# Patient Record
Sex: FEMALE | Race: WHITE | Employment: OTHER | URBAN - NONMETROPOLITAN AREA
[De-identification: names, ages, dates, MRNs, and addresses within clinical notes are randomized per-mention and may not be internally consistent; named-entity substitution may affect disease eponyms.]

---

## 2017-07-18 ENCOUNTER — APPOINTMENT (OUTPATIENT)
Dept: OCCUPATIONAL THERAPY | Age: 67
End: 2017-07-18
Payer: MEDICARE

## 2017-07-18 ENCOUNTER — HOSPITAL ENCOUNTER (OUTPATIENT)
Dept: OCCUPATIONAL THERAPY | Age: 67
Setting detail: THERAPIES SERIES
Discharge: HOME | End: 2017-07-18
Payer: MEDICARE

## 2017-07-18 PROCEDURE — 97165 OT EVAL LOW COMPLEX 30 MIN: CPT

## 2017-07-18 PROCEDURE — G8987 SELF CARE CURRENT STATUS: HCPCS

## 2017-07-18 PROCEDURE — 97110 THERAPEUTIC EXERCISES: CPT

## 2017-07-18 PROCEDURE — G8988 SELF CARE GOAL STATUS: HCPCS

## 2017-07-18 ASSESSMENT — PAIN SCALES - GENERAL: PAINLEVEL_OUTOF10: 3

## 2017-07-18 ASSESSMENT — PAIN DESCRIPTION - LOCATION: LOCATION: SHOULDER

## 2017-07-18 ASSESSMENT — PAIN DESCRIPTION - ORIENTATION: ORIENTATION: LEFT

## 2017-07-20 ENCOUNTER — HOSPITAL ENCOUNTER (OUTPATIENT)
Dept: OCCUPATIONAL THERAPY | Age: 67
Setting detail: THERAPIES SERIES
Discharge: HOME | End: 2017-07-20
Payer: MEDICARE

## 2017-07-20 PROCEDURE — 97110 THERAPEUTIC EXERCISES: CPT

## 2017-07-20 ASSESSMENT — PAIN DESCRIPTION - ORIENTATION: ORIENTATION: LEFT

## 2017-07-20 ASSESSMENT — PAIN SCALES - GENERAL: PAINLEVEL_OUTOF10: 3

## 2017-07-20 ASSESSMENT — PAIN DESCRIPTION - LOCATION: LOCATION: SHOULDER

## 2017-07-24 ENCOUNTER — HOSPITAL ENCOUNTER (OUTPATIENT)
Dept: OCCUPATIONAL THERAPY | Age: 67
Setting detail: THERAPIES SERIES
Discharge: HOME | End: 2017-07-24
Payer: MEDICARE

## 2017-07-24 PROCEDURE — 97110 THERAPEUTIC EXERCISES: CPT

## 2017-07-24 ASSESSMENT — PAIN SCALES - GENERAL: PAINLEVEL_OUTOF10: 3

## 2017-07-24 ASSESSMENT — PAIN DESCRIPTION - LOCATION: LOCATION: SHOULDER

## 2017-07-24 ASSESSMENT — PAIN DESCRIPTION - ORIENTATION: ORIENTATION: LEFT

## 2017-07-31 ENCOUNTER — HOSPITAL ENCOUNTER (OUTPATIENT)
Dept: OCCUPATIONAL THERAPY | Age: 67
Setting detail: THERAPIES SERIES
Discharge: HOME | End: 2017-07-31
Payer: MEDICARE

## 2017-07-31 PROCEDURE — 97110 THERAPEUTIC EXERCISES: CPT

## 2017-07-31 ASSESSMENT — PAIN DESCRIPTION - ORIENTATION: ORIENTATION: LEFT

## 2017-07-31 ASSESSMENT — PAIN SCALES - GENERAL: PAINLEVEL_OUTOF10: 3

## 2017-07-31 ASSESSMENT — PAIN DESCRIPTION - LOCATION: LOCATION: SHOULDER

## 2017-08-01 ENCOUNTER — HOSPITAL ENCOUNTER (OUTPATIENT)
Dept: OCCUPATIONAL THERAPY | Age: 67
Setting detail: THERAPIES SERIES
Discharge: HOME | End: 2017-08-01
Payer: MEDICARE

## 2017-08-01 PROCEDURE — 97110 THERAPEUTIC EXERCISES: CPT

## 2017-08-01 ASSESSMENT — PAIN DESCRIPTION - LOCATION: LOCATION: SHOULDER;ARM

## 2017-08-01 ASSESSMENT — PAIN DESCRIPTION - ORIENTATION: ORIENTATION: LEFT

## 2017-08-01 ASSESSMENT — PAIN SCALES - GENERAL: PAINLEVEL_OUTOF10: 3

## 2017-08-03 ENCOUNTER — HOSPITAL ENCOUNTER (OUTPATIENT)
Dept: OCCUPATIONAL THERAPY | Age: 67
Setting detail: THERAPIES SERIES
Discharge: HOME | End: 2017-08-03
Payer: MEDICARE

## 2017-08-03 PROCEDURE — 97110 THERAPEUTIC EXERCISES: CPT

## 2017-08-03 ASSESSMENT — PAIN SCALES - GENERAL: PAINLEVEL_OUTOF10: 3

## 2017-08-03 ASSESSMENT — PAIN DESCRIPTION - LOCATION: LOCATION: SHOULDER

## 2017-08-03 ASSESSMENT — PAIN DESCRIPTION - ORIENTATION: ORIENTATION: LEFT

## 2017-08-07 ENCOUNTER — HOSPITAL ENCOUNTER (OUTPATIENT)
Dept: OCCUPATIONAL THERAPY | Age: 67
Setting detail: THERAPIES SERIES
Discharge: HOME | End: 2017-08-07
Payer: MEDICARE

## 2017-08-07 PROCEDURE — 97110 THERAPEUTIC EXERCISES: CPT

## 2017-08-07 ASSESSMENT — PAIN SCALES - GENERAL: PAINLEVEL_OUTOF10: 3

## 2017-08-07 ASSESSMENT — PAIN DESCRIPTION - ORIENTATION: ORIENTATION: LEFT

## 2017-08-07 ASSESSMENT — PAIN DESCRIPTION - LOCATION: LOCATION: SHOULDER

## 2017-08-08 ENCOUNTER — HOSPITAL ENCOUNTER (OUTPATIENT)
Dept: OCCUPATIONAL THERAPY | Age: 67
Setting detail: THERAPIES SERIES
Discharge: HOME | End: 2017-08-08
Payer: MEDICARE

## 2017-08-08 PROCEDURE — G8987 SELF CARE CURRENT STATUS: HCPCS

## 2017-08-08 PROCEDURE — G8988 SELF CARE GOAL STATUS: HCPCS

## 2017-08-08 PROCEDURE — 97110 THERAPEUTIC EXERCISES: CPT

## 2017-08-08 ASSESSMENT — PAIN DESCRIPTION - ORIENTATION: ORIENTATION: LEFT

## 2017-08-08 ASSESSMENT — PAIN SCALES - GENERAL: PAINLEVEL_OUTOF10: 3

## 2017-08-08 ASSESSMENT — PAIN DESCRIPTION - LOCATION: LOCATION: SHOULDER

## 2017-08-10 ENCOUNTER — HOSPITAL ENCOUNTER (OUTPATIENT)
Age: 67
Discharge: HOME | End: 2017-08-10
Payer: MEDICARE

## 2017-08-10 ENCOUNTER — HOSPITAL ENCOUNTER (OUTPATIENT)
Dept: OCCUPATIONAL THERAPY | Age: 67
Setting detail: THERAPIES SERIES
Discharge: HOME | End: 2017-08-10
Payer: MEDICARE

## 2017-08-10 LAB
ANION GAP SERPL CALCULATED.3IONS-SCNC: 13 MEQ/L (ref 8–16)
BASOPHILS # BLD: 0.9 %
BASOPHILS ABSOLUTE: 0 THOU/MM3 (ref 0–0.1)
BUN BLDV-MCNC: 9 MG/DL (ref 7–22)
CALCIUM SERPL-MCNC: 9.5 MG/DL (ref 8.5–10.5)
CHLORIDE BLD-SCNC: 101 MEQ/L (ref 98–111)
CHOLESTEROL, TOTAL: 187 MG/DL (ref 100–199)
CO2: 26 MEQ/L (ref 23–33)
CREAT SERPL-MCNC: 0.6 MG/DL (ref 0.4–1.2)
EOSINOPHIL # BLD: 4.4 %
EOSINOPHILS ABSOLUTE: 0.2 THOU/MM3 (ref 0–0.4)
GFR SERPL CREATININE-BSD FRML MDRD: > 90 ML/MIN/1.73M2
GLUCOSE BLD-MCNC: 99 MG/DL (ref 70–108)
HCT VFR BLD CALC: 46.2 % (ref 37–47)
HDLC SERPL-MCNC: 69 MG/DL
HEMOGLOBIN: 15.5 GM/DL (ref 12–16)
HEPATITIS C ANTIBODY: NEGATIVE
LDL CHOLESTEROL CALCULATED: 105 MG/DL
LYMPHOCYTES # BLD: 27.8 %
LYMPHOCYTES ABSOLUTE: 1.5 THOU/MM3 (ref 1–4.8)
MCH RBC QN AUTO: 30.5 PG (ref 27–31)
MCHC RBC AUTO-ENTMCNC: 33.5 GM/DL (ref 33–37)
MCV RBC AUTO: 91 FL (ref 81–99)
MONOCYTES # BLD: 9.4 %
MONOCYTES ABSOLUTE: 0.5 THOU/MM3 (ref 0.4–1.3)
NUCLEATED RED BLOOD CELLS: 0 /100 WBC
PDW BLD-RTO: 14.3 % (ref 11.5–14.5)
PLATELET # BLD: 353 THOU/MM3 (ref 130–400)
PMV BLD AUTO: 8.2 MCM (ref 7.4–10.4)
POTASSIUM SERPL-SCNC: 4.5 MEQ/L (ref 3.5–5.2)
RBC # BLD: 5.07 MILL/MM3 (ref 4.2–5.4)
RBC # BLD: NORMAL 10*6/UL
SEG NEUTROPHILS: 57.5 %
SEGMENTED NEUTROPHILS ABSOLUTE COUNT: 3 THOU/MM3 (ref 1.8–7.7)
SODIUM BLD-SCNC: 140 MEQ/L (ref 135–145)
TRIGL SERPL-MCNC: 63 MG/DL (ref 0–199)
WBC # BLD: 5.3 THOU/MM3 (ref 4.8–10.8)

## 2017-08-10 PROCEDURE — 97110 THERAPEUTIC EXERCISES: CPT

## 2017-08-10 PROCEDURE — 80061 LIPID PANEL: CPT

## 2017-08-10 PROCEDURE — 36415 COLL VENOUS BLD VENIPUNCTURE: CPT

## 2017-08-10 PROCEDURE — 86803 HEPATITIS C AB TEST: CPT

## 2017-08-10 PROCEDURE — 80048 BASIC METABOLIC PNL TOTAL CA: CPT

## 2017-08-10 PROCEDURE — 85025 COMPLETE CBC W/AUTO DIFF WBC: CPT

## 2017-08-10 ASSESSMENT — PAIN DESCRIPTION - ORIENTATION: ORIENTATION: LEFT

## 2017-08-10 ASSESSMENT — PAIN DESCRIPTION - LOCATION: LOCATION: SHOULDER

## 2017-08-10 ASSESSMENT — PAIN SCALES - GENERAL: PAINLEVEL_OUTOF10: 3

## 2017-08-14 ENCOUNTER — HOSPITAL ENCOUNTER (OUTPATIENT)
Dept: OCCUPATIONAL THERAPY | Age: 67
Setting detail: THERAPIES SERIES
Discharge: HOME | End: 2017-08-14
Payer: MEDICARE

## 2017-08-14 PROCEDURE — 97110 THERAPEUTIC EXERCISES: CPT

## 2017-08-14 ASSESSMENT — PAIN SCALES - GENERAL: PAINLEVEL_OUTOF10: 3

## 2017-08-14 ASSESSMENT — PAIN DESCRIPTION - LOCATION: LOCATION: SHOULDER;ARM

## 2017-08-14 ASSESSMENT — PAIN DESCRIPTION - ORIENTATION: ORIENTATION: LEFT

## 2017-08-17 ENCOUNTER — HOSPITAL ENCOUNTER (OUTPATIENT)
Dept: OCCUPATIONAL THERAPY | Age: 67
Setting detail: THERAPIES SERIES
Discharge: HOME | End: 2017-08-17
Payer: MEDICARE

## 2017-08-17 PROCEDURE — 97110 THERAPEUTIC EXERCISES: CPT

## 2017-08-17 ASSESSMENT — PAIN SCALES - GENERAL: PAINLEVEL_OUTOF10: 3

## 2017-08-17 ASSESSMENT — PAIN DESCRIPTION - ORIENTATION: ORIENTATION: LEFT

## 2017-08-17 ASSESSMENT — PAIN DESCRIPTION - LOCATION: LOCATION: SHOULDER;ARM

## 2017-08-21 ENCOUNTER — HOSPITAL ENCOUNTER (OUTPATIENT)
Dept: OCCUPATIONAL THERAPY | Age: 67
Setting detail: THERAPIES SERIES
Discharge: HOME | End: 2017-08-21
Payer: MEDICARE

## 2017-08-21 PROCEDURE — 97110 THERAPEUTIC EXERCISES: CPT

## 2017-08-21 ASSESSMENT — PAIN DESCRIPTION - LOCATION: LOCATION: SHOULDER

## 2017-08-21 ASSESSMENT — PAIN DESCRIPTION - ORIENTATION: ORIENTATION: LEFT

## 2017-08-21 ASSESSMENT — PAIN SCALES - GENERAL: PAINLEVEL_OUTOF10: 2

## 2017-08-22 ENCOUNTER — HOSPITAL ENCOUNTER (OUTPATIENT)
Dept: OCCUPATIONAL THERAPY | Age: 67
Setting detail: THERAPIES SERIES
Discharge: HOME | End: 2017-08-22
Payer: MEDICARE

## 2017-08-22 PROCEDURE — 97110 THERAPEUTIC EXERCISES: CPT

## 2017-08-22 ASSESSMENT — PAIN DESCRIPTION - ORIENTATION: ORIENTATION: LEFT

## 2017-08-22 ASSESSMENT — PAIN DESCRIPTION - LOCATION: LOCATION: SHOULDER

## 2017-08-22 ASSESSMENT — PAIN SCALES - GENERAL: PAINLEVEL_OUTOF10: 2

## 2017-08-24 ENCOUNTER — HOSPITAL ENCOUNTER (OUTPATIENT)
Dept: OCCUPATIONAL THERAPY | Age: 67
Setting detail: THERAPIES SERIES
Discharge: HOME | End: 2017-08-24
Payer: MEDICARE

## 2017-08-24 PROCEDURE — 97110 THERAPEUTIC EXERCISES: CPT

## 2017-08-24 ASSESSMENT — PAIN DESCRIPTION - LOCATION: LOCATION: SHOULDER

## 2017-08-24 ASSESSMENT — PAIN SCALES - GENERAL: PAINLEVEL_OUTOF10: 2

## 2017-08-24 ASSESSMENT — PAIN DESCRIPTION - ORIENTATION: ORIENTATION: LEFT

## 2017-08-28 ENCOUNTER — HOSPITAL ENCOUNTER (OUTPATIENT)
Dept: OCCUPATIONAL THERAPY | Age: 67
Setting detail: THERAPIES SERIES
Discharge: HOME | End: 2017-08-28
Payer: MEDICARE

## 2017-08-28 PROCEDURE — 97110 THERAPEUTIC EXERCISES: CPT

## 2017-08-28 ASSESSMENT — PAIN DESCRIPTION - ORIENTATION: ORIENTATION: LEFT

## 2017-08-28 ASSESSMENT — PAIN DESCRIPTION - LOCATION: LOCATION: SHOULDER

## 2017-08-28 ASSESSMENT — PAIN SCALES - GENERAL: PAINLEVEL_OUTOF10: 2

## 2017-08-29 ENCOUNTER — HOSPITAL ENCOUNTER (OUTPATIENT)
Dept: OCCUPATIONAL THERAPY | Age: 67
Setting detail: THERAPIES SERIES
Discharge: HOME | End: 2017-08-29
Payer: MEDICARE

## 2017-08-29 PROCEDURE — 97110 THERAPEUTIC EXERCISES: CPT

## 2017-08-29 ASSESSMENT — PAIN DESCRIPTION - ORIENTATION: ORIENTATION: LEFT

## 2017-08-29 ASSESSMENT — PAIN DESCRIPTION - LOCATION: LOCATION: SHOULDER

## 2017-08-29 ASSESSMENT — PAIN SCALES - GENERAL: PAINLEVEL_OUTOF10: 2

## 2017-08-31 ENCOUNTER — HOSPITAL ENCOUNTER (OUTPATIENT)
Dept: OCCUPATIONAL THERAPY | Age: 67
Setting detail: THERAPIES SERIES
Discharge: HOME | End: 2017-08-31
Payer: MEDICARE

## 2017-08-31 PROCEDURE — 97110 THERAPEUTIC EXERCISES: CPT

## 2017-08-31 ASSESSMENT — PAIN DESCRIPTION - ORIENTATION: ORIENTATION: LEFT

## 2017-08-31 ASSESSMENT — PAIN SCALES - GENERAL: PAINLEVEL_OUTOF10: 3

## 2017-08-31 ASSESSMENT — PAIN DESCRIPTION - LOCATION: LOCATION: SHOULDER

## 2017-09-05 ENCOUNTER — HOSPITAL ENCOUNTER (OUTPATIENT)
Dept: OCCUPATIONAL THERAPY | Age: 67
Setting detail: THERAPIES SERIES
Discharge: HOME | End: 2017-09-05
Payer: MEDICARE

## 2017-09-05 PROCEDURE — 97110 THERAPEUTIC EXERCISES: CPT

## 2017-09-05 PROCEDURE — G8987 SELF CARE CURRENT STATUS: HCPCS

## 2017-09-05 PROCEDURE — G8988 SELF CARE GOAL STATUS: HCPCS

## 2017-09-05 ASSESSMENT — PAIN SCALES - GENERAL: PAINLEVEL_OUTOF10: 2

## 2017-09-05 ASSESSMENT — PAIN DESCRIPTION - ORIENTATION: ORIENTATION: LEFT

## 2017-09-05 ASSESSMENT — PAIN DESCRIPTION - LOCATION: LOCATION: SHOULDER

## 2017-09-07 ENCOUNTER — HOSPITAL ENCOUNTER (OUTPATIENT)
Dept: OCCUPATIONAL THERAPY | Age: 67
Setting detail: THERAPIES SERIES
Discharge: HOME | End: 2017-09-07
Payer: MEDICARE

## 2017-09-07 PROCEDURE — 97110 THERAPEUTIC EXERCISES: CPT

## 2017-09-07 ASSESSMENT — PAIN DESCRIPTION - ORIENTATION: ORIENTATION: LEFT

## 2017-09-07 ASSESSMENT — PAIN SCALES - GENERAL: PAINLEVEL_OUTOF10: 1

## 2017-09-07 ASSESSMENT — PAIN DESCRIPTION - LOCATION: LOCATION: SHOULDER

## 2017-09-11 ENCOUNTER — HOSPITAL ENCOUNTER (OUTPATIENT)
Dept: OCCUPATIONAL THERAPY | Age: 67
Setting detail: THERAPIES SERIES
Discharge: HOME | End: 2017-09-11
Payer: MEDICARE

## 2017-09-11 PROCEDURE — 97110 THERAPEUTIC EXERCISES: CPT

## 2017-09-11 ASSESSMENT — PAIN SCALES - GENERAL: PAINLEVEL_OUTOF10: 1

## 2017-09-11 ASSESSMENT — PAIN DESCRIPTION - ORIENTATION: ORIENTATION: LEFT

## 2017-09-11 ASSESSMENT — PAIN DESCRIPTION - LOCATION: LOCATION: SHOULDER

## 2017-09-14 ENCOUNTER — HOSPITAL ENCOUNTER (OUTPATIENT)
Dept: OCCUPATIONAL THERAPY | Age: 67
Setting detail: THERAPIES SERIES
Discharge: HOME | End: 2017-09-14
Payer: MEDICARE

## 2017-09-14 PROCEDURE — 97110 THERAPEUTIC EXERCISES: CPT

## 2017-09-14 ASSESSMENT — PAIN SCALES - GENERAL: PAINLEVEL_OUTOF10: 2

## 2017-09-14 ASSESSMENT — PAIN DESCRIPTION - ORIENTATION: ORIENTATION: LEFT

## 2017-09-14 ASSESSMENT — PAIN DESCRIPTION - LOCATION: LOCATION: SHOULDER

## 2017-09-18 ENCOUNTER — HOSPITAL ENCOUNTER (OUTPATIENT)
Dept: OCCUPATIONAL THERAPY | Age: 67
Setting detail: THERAPIES SERIES
Discharge: HOME | End: 2017-09-18
Payer: MEDICARE

## 2017-09-18 PROCEDURE — 97110 THERAPEUTIC EXERCISES: CPT

## 2017-09-18 ASSESSMENT — PAIN DESCRIPTION - LOCATION: LOCATION: SHOULDER

## 2017-09-18 ASSESSMENT — PAIN SCALES - GENERAL: PAINLEVEL_OUTOF10: 4

## 2017-09-18 ASSESSMENT — PAIN DESCRIPTION - ORIENTATION: ORIENTATION: LEFT

## 2017-09-19 ENCOUNTER — HOSPITAL ENCOUNTER (OUTPATIENT)
Dept: OCCUPATIONAL THERAPY | Age: 67
Setting detail: THERAPIES SERIES
Discharge: HOME | End: 2017-09-19
Payer: MEDICARE

## 2017-09-19 PROCEDURE — 97110 THERAPEUTIC EXERCISES: CPT

## 2017-09-19 ASSESSMENT — PAIN DESCRIPTION - LOCATION: LOCATION: SHOULDER

## 2017-09-19 ASSESSMENT — PAIN DESCRIPTION - ORIENTATION: ORIENTATION: LEFT

## 2017-09-19 ASSESSMENT — PAIN SCALES - GENERAL: PAINLEVEL_OUTOF10: 2

## 2017-09-21 ENCOUNTER — HOSPITAL ENCOUNTER (OUTPATIENT)
Dept: OCCUPATIONAL THERAPY | Age: 67
Setting detail: THERAPIES SERIES
Discharge: HOME | End: 2017-09-21
Payer: MEDICARE

## 2017-09-21 PROCEDURE — 97110 THERAPEUTIC EXERCISES: CPT

## 2017-09-21 ASSESSMENT — PAIN DESCRIPTION - LOCATION: LOCATION: SHOULDER

## 2017-09-21 ASSESSMENT — PAIN DESCRIPTION - ORIENTATION: ORIENTATION: LEFT

## 2017-09-21 ASSESSMENT — PAIN SCALES - GENERAL: PAINLEVEL_OUTOF10: 2

## 2017-09-25 ENCOUNTER — HOSPITAL ENCOUNTER (OUTPATIENT)
Dept: OCCUPATIONAL THERAPY | Age: 67
Setting detail: THERAPIES SERIES
Discharge: HOME | End: 2017-09-25
Payer: MEDICARE

## 2017-09-25 PROCEDURE — 97110 THERAPEUTIC EXERCISES: CPT

## 2017-09-25 ASSESSMENT — PAIN SCALES - GENERAL: PAINLEVEL_OUTOF10: 2

## 2017-09-25 ASSESSMENT — PAIN DESCRIPTION - ORIENTATION: ORIENTATION: LEFT

## 2017-09-25 ASSESSMENT — PAIN DESCRIPTION - LOCATION: LOCATION: SHOULDER

## 2017-09-26 ENCOUNTER — HOSPITAL ENCOUNTER (OUTPATIENT)
Dept: OCCUPATIONAL THERAPY | Age: 67
Setting detail: THERAPIES SERIES
Discharge: HOME | End: 2017-09-26
Payer: MEDICARE

## 2017-09-26 PROCEDURE — 97110 THERAPEUTIC EXERCISES: CPT

## 2017-09-26 ASSESSMENT — PAIN DESCRIPTION - LOCATION: LOCATION: SHOULDER

## 2017-09-26 ASSESSMENT — PAIN SCALES - GENERAL: PAINLEVEL_OUTOF10: 2

## 2017-09-26 ASSESSMENT — PAIN DESCRIPTION - ORIENTATION: ORIENTATION: LEFT

## 2017-09-27 ENCOUNTER — HOSPITAL ENCOUNTER (OUTPATIENT)
Dept: MAMMOGRAPHY | Age: 67
Discharge: HOME | End: 2017-09-27
Payer: MEDICARE

## 2017-09-27 DIAGNOSIS — Z12.9 SCREENING FOR CANCER: ICD-10-CM

## 2017-09-27 PROCEDURE — G0202 SCR MAMMO BI INCL CAD: HCPCS

## 2017-09-28 ENCOUNTER — HOSPITAL ENCOUNTER (OUTPATIENT)
Dept: OCCUPATIONAL THERAPY | Age: 67
Setting detail: THERAPIES SERIES
Discharge: HOME | End: 2017-09-28
Payer: MEDICARE

## 2017-09-28 PROCEDURE — 97110 THERAPEUTIC EXERCISES: CPT

## 2017-09-28 ASSESSMENT — PAIN DESCRIPTION - ORIENTATION: ORIENTATION: LEFT

## 2017-09-28 ASSESSMENT — PAIN DESCRIPTION - LOCATION: LOCATION: SHOULDER

## 2017-09-28 ASSESSMENT — PAIN SCALES - GENERAL: PAINLEVEL_OUTOF10: 1

## 2017-10-02 ENCOUNTER — APPOINTMENT (OUTPATIENT)
Dept: OCCUPATIONAL THERAPY | Age: 67
End: 2017-10-02
Payer: MEDICARE

## 2017-10-03 ENCOUNTER — HOSPITAL ENCOUNTER (OUTPATIENT)
Dept: OCCUPATIONAL THERAPY | Age: 67
Setting detail: THERAPIES SERIES
Discharge: HOME | End: 2017-10-03
Payer: MEDICARE

## 2017-10-03 PROCEDURE — G8987 SELF CARE CURRENT STATUS: HCPCS

## 2017-10-03 PROCEDURE — 97110 THERAPEUTIC EXERCISES: CPT

## 2017-10-03 PROCEDURE — G8988 SELF CARE GOAL STATUS: HCPCS

## 2017-10-03 ASSESSMENT — PAIN SCALES - GENERAL: PAINLEVEL_OUTOF10: 2

## 2017-10-03 ASSESSMENT — PAIN DESCRIPTION - LOCATION: LOCATION: SHOULDER

## 2017-10-03 ASSESSMENT — PAIN DESCRIPTION - DESCRIPTORS: DESCRIPTORS: OTHER (COMMENT)

## 2017-10-03 ASSESSMENT — PAIN DESCRIPTION - ORIENTATION: ORIENTATION: LEFT;ANTERIOR

## 2017-10-03 NOTE — PROGRESS NOTES
Jennifer active left shoulder flexion to 120, abduction to 120, get left thumb 2\" above waistband behind back to increase her ability to reach out car window. GOAL NOT MET - SEE OBJECTIVE SECTION OF NOTE FOR DETAILS - REVISED GOAL; Increase active left shoulder flexiont o 120, abduction to 120, get left thumb 2\" above waistband behind back, ER at side to 40, and ER at 90 to 40 to increase her ability to reach into upper cupboard. Short term goal 3: Increase passive left shoulder flexion to 140, abduction to 110, IR to 70, ER at side to 40, and ER at 90 to 10 to increase flexibitliy to allow patient to return to reaching to upper cupboard. GOAL MET FOR ABDUCTION, IR, ER AT SIDE AND ER AT 90, GOAL NOT MET FOR FLEXION = SEE OBJECTIVE SECTION OF NOTE FOR DETAILS - REVISED GOAL; Increase passive elft shoulder flexiont o1 40, abduction to 140, IR to 80, and ER at 90 to 45 to increase flexibility to allow patient to reach behind her head without diffciutly   Short term goal 4: Report being able to turn steering wheel with left arm without pain or difficulty. GOAL MET - REVISED GOAL; Be able to retrieve towels from upper shelf with left UE without difficulty. Long term goals  Time Frame for Long term goals : 4 weeks  Long term goal 1: Report being able to sleep in bed without waking due to left shoulder pain. GOAL MET - REVISED GOAL: Be able to retrieve lightweight item from drive thru using left UE. Long term goal 2: Be able to reach to second shelf of upper cupboard using left arm to retrieve lightweight item. GOAL NOT MET, BUT PATIENT RELATES THAT SHE IS SEEING PROGRESS - CONTINUE GOAL  Long term goal 3: Be able to use left arm to turn steering wheel without difficulty. GOAL MET - REVISED GOAL: Be able to groom back of hair without difficulty using left UE. Long term goal 4: Be able to put letter in mailbox using left arm while in car without difficulty.  GOAL MET - DISCONTINUE GOAL    OT G-codes  Functional

## 2017-10-04 ENCOUNTER — HOSPITAL ENCOUNTER (OUTPATIENT)
Dept: OCCUPATIONAL THERAPY | Age: 67
Setting detail: THERAPIES SERIES
Discharge: HOME | End: 2017-10-04
Payer: MEDICARE

## 2017-10-04 PROCEDURE — 97110 THERAPEUTIC EXERCISES: CPT

## 2017-10-04 ASSESSMENT — PAIN DESCRIPTION - LOCATION: LOCATION: SHOULDER;ARM

## 2017-10-04 ASSESSMENT — PAIN DESCRIPTION - ORIENTATION: ORIENTATION: LEFT

## 2017-10-04 ASSESSMENT — PAIN SCALES - GENERAL: PAINLEVEL_OUTOF10: 2

## 2017-10-04 NOTE — PROGRESS NOTES
Plan:  Plan Comment: Continue per established POC  Specific instructions for Next Treatment: PROM, AROM, AAROM;functional strengthening                 Judd Shane, OTR/L #[id number]

## 2017-10-05 ENCOUNTER — HOSPITAL ENCOUNTER (OUTPATIENT)
Dept: OCCUPATIONAL THERAPY | Age: 67
Setting detail: THERAPIES SERIES
Discharge: HOME | End: 2017-10-05
Payer: MEDICARE

## 2017-10-05 PROCEDURE — 97110 THERAPEUTIC EXERCISES: CPT

## 2017-10-05 ASSESSMENT — PAIN DESCRIPTION - ORIENTATION: ORIENTATION: LEFT

## 2017-10-05 ASSESSMENT — PAIN SCALES - GENERAL: PAINLEVEL_OUTOF10: 2

## 2017-10-05 ASSESSMENT — PAIN DESCRIPTION - LOCATION: LOCATION: SHOULDER;ARM

## 2017-10-05 NOTE — PROGRESS NOTES
[facility]  OUTPATIENT OCCUPATIONAL THERAPY  Daily Note  [address] [facility].    Time In: 930  Time Out: 1000  Minutes: 30  Timed Code Treatment Minutes: 30 Minutes     Date: 10/5/2017  Patient Name: Richie Patel        CSN: [id number]   : 1950  (79 y.o.)  Gender: female   Referring Practitioner: French Novak PA-C  Diagnosis: M19.012 Glenohumeral arthritis, left          General:  OT Visit Information  Onset Date: 17  OT Insurance Information: Medicare - no hot pack, no cold pack, no ionto,$3700 calender cap  Total # of Visits to Date: 28  Certification Period Expiration Date: 10/31/17  Progress Note Counter: PN completed on 10/3; 2/10 for PN  Comments: reutns to physician on 10/18       Restrictions/Precautions:       Position Activity Restriction  Other position/activity restrictions: Dr. Yuli Tobar TSA protocol - surgery 17         Subjective:  Subjective: States that she is doing well with the consolidated HEP and it helps her to have a routine. States that she had quite a bit of stiffness yesterday. Pain:  Patient Currently in Pain: Yes  Pain Assessment: 0-10  Pain Level: 2  Pain Location: Shoulder, Arm  Pain Orientation: Left       Objective:     Upper Extremity Function  UE PROM: pulleys for shoulder flexion and abduction x 20 reps each; supine PROM to left shoulder in all planes to patient tolerance  UE Stretching: ER stretch in doorway x 10 reps for left UE; countertop dips for capsular stretching x 10 reps with 5 second hold  UE Strengthing: reaching endurance activity of placing clothespins onto vertical post using left UE - completed 2 cycles; yellow theraband - lefft shoulder flexion and extension x 15 reps; biodex at 60 speed x 3 minutes forward and 3 minutes backward                                                Activity Tolerance:   Additional Comments: tolerated session well    Assessment:  Assessment: Progressing toward goals    Patient

## 2017-10-10 ENCOUNTER — HOSPITAL ENCOUNTER (OUTPATIENT)
Dept: OCCUPATIONAL THERAPY | Age: 67
Setting detail: THERAPIES SERIES
Discharge: HOME | End: 2017-10-10
Payer: MEDICARE

## 2017-10-10 PROCEDURE — 97110 THERAPEUTIC EXERCISES: CPT

## 2017-10-10 ASSESSMENT — PAIN DESCRIPTION - ORIENTATION: ORIENTATION: LEFT

## 2017-10-10 ASSESSMENT — PAIN DESCRIPTION - LOCATION: LOCATION: SHOULDER;ARM

## 2017-10-10 ASSESSMENT — PAIN SCALES - GENERAL: PAINLEVEL_OUTOF10: 2

## 2017-10-12 ENCOUNTER — HOSPITAL ENCOUNTER (OUTPATIENT)
Dept: OCCUPATIONAL THERAPY | Age: 67
Setting detail: THERAPIES SERIES
Discharge: HOME | End: 2017-10-12
Payer: MEDICARE

## 2017-10-12 PROCEDURE — 97110 THERAPEUTIC EXERCISES: CPT

## 2017-10-12 ASSESSMENT — PAIN DESCRIPTION - LOCATION: LOCATION: SHOULDER

## 2017-10-12 ASSESSMENT — PAIN DESCRIPTION - ORIENTATION: ORIENTATION: LEFT

## 2017-10-12 ASSESSMENT — PAIN SCALES - GENERAL: PAINLEVEL_OUTOF10: 1

## 2017-10-12 NOTE — PROGRESS NOTES
[address] [address]  OUTPATIENT OCCUPATIONAL THERAPY  Daily Note  [address] [facility].    Time In: 1050  Time Out: 1120  Minutes: 30  Timed Code Treatment Minutes: 30 Minutes     Date: 10/12/2017  Patient Name: Eleni Noble        CSN: [id number]   : 1950  (79 y.o.)  Gender: female   Referring Practitioner: Tootie Samuel PA-C  Diagnosis: M19.012 Glenohumeral arthritis, left          General:  OT Visit Information  Onset Date: 17  OT Insurance Information: Medicare - no hot pack, no cold pack, no ionto,$3700 calender cap  Total # of Visits to Date: 29  Certification Period Expiration Date: 10/31/17  Progress Note Counter: PN completed on 10/3; 4/10 for PN  Comments: reutns to physician on 10/18       Restrictions/Precautions:       Position Activity Restriction  Other position/activity restrictions: Dr. Fany Duncan TSA protocol - surgery 17         Subjective:  Subjective: States that she has a little bit of a stiff neck this morning. States that she didn't sleep well last night. Pain:  Patient Currently in Pain: Yes  Pain Assessment: 0-10  Pain Level: 1  Pain Location: Shoulder  Pain Orientation: Left       Objective:     Upper Extremity Function  UE AROM: half-[place] motions on wall with left UE x 10 sets of 3 reps  UE PROM: pulleys for shoulder flexion and abduction x 20 reps each; supine PROM to left shoulder in all planes with prolonged hold with ER of left shoulder (slightly increased this date via observation)  UE Strengthing: reaching endurance activity of placing clothespins onto vertical post using left UE - completed 2 cycles; red theraband - left shoulder flexion and extension x 15 reps each; biodex at 60 speed x 3 minutes forward and 3 minutes backward                                                Activity Tolerance: Additional Comments:  Tolerated session well    Assessment:  Assessment: Progressing toward goals    Patient Education:  Patient Education: to

## 2017-10-17 ENCOUNTER — HOSPITAL ENCOUNTER (OUTPATIENT)
Dept: OCCUPATIONAL THERAPY | Age: 67
Setting detail: THERAPIES SERIES
Discharge: HOME | End: 2017-10-17
Payer: MEDICARE

## 2017-10-17 PROCEDURE — 97110 THERAPEUTIC EXERCISES: CPT

## 2017-10-17 ASSESSMENT — PAIN DESCRIPTION - LOCATION: LOCATION: SHOULDER

## 2017-10-17 ASSESSMENT — PAIN DESCRIPTION - ORIENTATION: ORIENTATION: LEFT

## 2017-10-17 ASSESSMENT — PAIN SCALES - GENERAL: PAINLEVEL_OUTOF10: 1

## 2017-10-17 NOTE — PROGRESS NOTES
[address] [facility]  OUTPATIENT OCCUPATIONAL THERAPY  Daily Note  [address] [facility].    Time In: 0800  Time Out: 0830  Minutes: 30  Timed Code Treatment Minutes: 30 Minutes     Date: 10/17/2017  Patient Name: Karrie Moss        CSN: [id number]   : 1950  (79 y.o.)  Gender: female   Referring Practitioner: Jose Antonio Ramirez PA-C  Diagnosis: M19.012 Glenohumeral arthritis, left          General:  OT Visit Information  Onset Date: 17  OT Insurance Information: Medicare - no hot pack, no cold pack, no ionto,$3700 calender cap  Total # of Visits to Date: 28  Certification Period Expiration Date: 10/31/17  Progress Note Counter: PN completed on 10/3; 5/10 for PN  Comments: reutns to physician on 10/18       Restrictions/Precautions:       Position Activity Restriction  Other position/activity restrictions: Dr. Rohith Edmonds TSA protocol - surgery 17         Subjective:  Subjective: States that she is \"doing good\". Also reports that she has less pain. States that she is moving her arm more than she did. States that she is able to move her arm more now than she could prior to her surgery - especially getting dressed.            Pain:  Patient Currently in Pain: Yes  Pain Assessment: 0-10  Pain Level: 1  Pain Location: Shoulder  Pain Orientation: Left       Objective:     Upper Extremity Function  UE AROM: half-[place] motions on wall with left UE x 10 sets of 3 reps  UE PROM: pulleys for shoulder flexion and abduction x 20 reps each; supine PROM to left shoulder in all planes to patient tolerance  UE AAROM: bilateral wall slides for shoulder flexion x 15 reps  UE Stretching: ER stretch in doorway x 10 reps for left UE  UE Strengthing: red theraband - left shoulder flexion and extension x 15 reps each; reaching endurance activity of placing clothespins onto vertical post using left UE - completed 2 cycles; [place] at 60 speed x 3 minutes forward and 3 minutes backward

## 2017-10-19 ENCOUNTER — HOSPITAL ENCOUNTER (OUTPATIENT)
Dept: OCCUPATIONAL THERAPY | Age: 67
Setting detail: THERAPIES SERIES
Discharge: HOME | End: 2017-10-19
Payer: MEDICARE

## 2017-10-19 PROCEDURE — 97110 THERAPEUTIC EXERCISES: CPT

## 2017-10-19 ASSESSMENT — PAIN DESCRIPTION - ORIENTATION: ORIENTATION: LEFT

## 2017-10-19 ASSESSMENT — PAIN DESCRIPTION - LOCATION: LOCATION: SHOULDER

## 2017-10-19 ASSESSMENT — PAIN SCALES - GENERAL: PAINLEVEL_OUTOF10: 1

## 2017-10-19 NOTE — PROGRESS NOTES
[address] [address]  OUTPATIENT OCCUPATIONAL THERAPY  Daily Note  [facility]    Time In: 1115  Time Out: 1145  Minutes: 30  Timed Code Treatment Minutes: 30 Minutes     Date: 10/19/2017  Patient Name: Richie Patel        CSN: [id number]   : 1950  (79 y.o.)  Gender: female   Referring Practitioner: French Novak PA-C  Diagnosis: M19.012 Glenohumeral arthritis, left          General:  OT Visit Information  Onset Date: 17  OT Insurance Information: Medicare - no hot pack, no cold pack, no ionto,$3700 calender cap  Total # of Visits to Date: 39  Certification Period Expiration Date: 10/31/17  Progress Note Counter: PN completed on 10/3; 6/10 for PN  Comments: no follow up with physician       Restrictions/Precautions:       Position Activity Restriction  Other position/activity restrictions: Dr. Yuli Tobar TSA protocol - surgery 17         Subjective:  Subjective: Saw physician yesterday and was released from him. Was told that he was pleased with the progress that she has made. States that he told her to continue with HEP. Pain:  Patient Currently in Pain: Yes  Pain Assessment: 0-10  Pain Level: 1  Pain Location: Shoulder  Pain Orientation: Left       Objective:     Upper Extremity Function  UE PROM: pulleys for shoulder flexion and abduction x 20 reps each  UE Stretching: ER stretch in doorway x 10 reps for left UE  UE Strengthing: red theraband - left shoulder flexion and extension x 20 reps each, yellow theraband - horizontal abduction/adduction x 20 reps with left UE, standing pull-aparts at waist level x 20 reps, standing pull-aparts at mid-torso level x 20 reps; reaching activity with left UE - placing and removing clothespins from vertical post; bidoex at 60 speed x 3 minutes forward and 3 minutes backward                                                Activity Tolerance: Additional Comments:  Tolerated session well    Assessment:  Assessment: Progressing toward goals    Patient Education:  Patient Education: yellow theraband - horizontal abduction/adduction, pull aparts at waist and mid-torso levels            Plan:  Plan Comment: Continue per established POC; plan to discharge patient at next visit  Specific instructions for Next Treatment: PROM, AROM, AAROM;functional strengthening                      Hilaria Tobias, OTR/L #[id number]

## 2017-10-24 ENCOUNTER — HOSPITAL ENCOUNTER (OUTPATIENT)
Dept: OCCUPATIONAL THERAPY | Age: 67
Setting detail: THERAPIES SERIES
Discharge: HOME | End: 2017-10-24
Payer: MEDICARE

## 2017-10-24 PROCEDURE — 97110 THERAPEUTIC EXERCISES: CPT

## 2017-10-24 PROCEDURE — G8988 SELF CARE GOAL STATUS: HCPCS

## 2017-10-24 PROCEDURE — G8989 SELF CARE D/C STATUS: HCPCS

## 2017-10-24 NOTE — PROGRESS NOTES
well    Assessment:  Assessment: Patient has made nice progress toward goals while in therapy. Patient does continue to have significant tightness in left shoulder ER (most especially ER at side), but patient is able to complete all of her daily tasks without difficulty. Patient does appear to have some motor planning difficulties when attempting to actively perform ER at side. Patient is able to complete HEP as instructed and it has been recommended for her to continue with HEP 2 x day for about a month and then 1 x day. Patient Education:  Patient Education: reviewed HEP with patient, goal status            Plan:  Plan Comment: Discharge from therapy as patient is now able to complete all daily tasks without difficulty. Patient will be continuing with HEP after discharge from therapy. Patient goals : To get more use of my left arm. To be able to lift my arm more. Short term goals  Time Frame for Short term goals: 2 weeks  Short term goal 1: Be independent with HEP as instructed to increase her abiltiy to perform hair care. GOAL MET   Short term goal 2: Increase active left shoulder flexion to 120, abduction to 120, get left thumb 2\" above waistband behind back, ER at side to 40, and ER at 90 to 40 to increase her ability to reach into upper cupboard. GOAL MET FOR ER AT 90, GOAL NOT MET FOR OTHER MOTIONS - SEE OBJECTIVE SECTION OF NOTE FOR DETAILS  Short term goal 3: Increase passive left shoulder flexion to1 40, abduction to 140, IR to 80, and ER at 90 to 45 to increase flexibility to allow patient to reach behind her head without diffciutly  GOAL NOT MET - SEE OBJECTIVE SECTION OF NOTE FOR DETAILS  Short term goal 4:  Be able to retrieve towels from upper shelf with left UE without difficulty. GOAL MET  Long term goals  Time Frame for Long term goals : 4 weeks  Long term goal 1:  Be able to retrieve lightweight item from drive thru using left UE.   GOAL MET  Long term goal 2: Be able to reach to second shelf of upper cupboard using left arm to retrieve lightweight item. GOAL MET  Long term goal 3:  Be able to groom back of hair without difficulty using left UE. GOAL NOT MET - REPORTS A LITTLE DIFFICULTY WITH THIS ACTIVITY    OT G-codes  Functional Assessment Tool Used: Upper Extremity Functional Scale  Score: 76  Functional Limitation: Self care  Self Care Goal Status (): At least 1 percent but less than 20 percent impaired, limited or restricted  Self Care Discharge Status ():  At least 1 percent but less than 20 percent impaired, limited or restricted       Zaid Power, OTR/L #[id number]

## 2017-12-28 ENCOUNTER — HOSPITAL ENCOUNTER (OUTPATIENT)
Age: 67
Discharge: HOME | End: 2017-12-28
Payer: MEDICARE

## 2017-12-28 ENCOUNTER — HOSPITAL ENCOUNTER (OUTPATIENT)
Dept: GENERAL RADIOLOGY | Age: 67
Discharge: HOME | End: 2017-12-28
Payer: MEDICARE

## 2017-12-28 DIAGNOSIS — R05.9 COUGH: ICD-10-CM

## 2017-12-28 PROCEDURE — 71020 XR CHEST STANDARD TWO VW: CPT

## 2018-05-24 ENCOUNTER — HOSPITAL ENCOUNTER (OUTPATIENT)
Dept: MRI IMAGING | Age: 68
Discharge: HOME | End: 2018-05-24
Payer: MEDICARE

## 2018-05-24 DIAGNOSIS — M54.40 LOW BACK PAIN WITH SCIATICA, SCIATICA LATERALITY UNSPECIFIED, UNSPECIFIED BACK PAIN LATERALITY, UNSPECIFIED CHRONICITY: ICD-10-CM

## 2018-05-24 PROCEDURE — 72148 MRI LUMBAR SPINE W/O DYE: CPT

## 2018-09-24 ENCOUNTER — HOSPITAL ENCOUNTER (OUTPATIENT)
Age: 68
Discharge: HOME | End: 2018-09-24
Payer: MEDICARE

## 2018-09-24 LAB
ABO: NORMAL
ANTIBODY SCREEN: NORMAL
RH FACTOR: NORMAL

## 2018-09-24 PROCEDURE — 86900 BLOOD TYPING SEROLOGIC ABO: CPT

## 2018-09-24 PROCEDURE — 36415 COLL VENOUS BLD VENIPUNCTURE: CPT

## 2018-09-24 PROCEDURE — 86901 BLOOD TYPING SEROLOGIC RH(D): CPT

## 2018-09-24 PROCEDURE — 86850 RBC ANTIBODY SCREEN: CPT

## 2019-03-15 ENCOUNTER — HOSPITAL ENCOUNTER (OUTPATIENT)
Dept: MAMMOGRAPHY | Age: 69
Discharge: HOME | End: 2019-03-15
Payer: MEDICARE

## 2019-03-15 DIAGNOSIS — Z12.39 BREAST SCREENING: ICD-10-CM

## 2019-03-15 PROCEDURE — 77063 BREAST TOMOSYNTHESIS BI: CPT

## 2020-06-03 ENCOUNTER — HOSPITAL ENCOUNTER (OUTPATIENT)
Dept: MAMMOGRAPHY | Age: 70
Discharge: HOME | End: 2020-06-03
Payer: MEDICARE

## 2020-06-03 PROCEDURE — 77063 BREAST TOMOSYNTHESIS BI: CPT

## 2020-09-02 ENCOUNTER — HOSPITAL ENCOUNTER (OUTPATIENT)
Age: 70
Discharge: HOME | End: 2020-09-02
Payer: MEDICARE

## 2020-09-02 LAB
ANION GAP SERPL CALCULATED.3IONS-SCNC: 12 MEQ/L (ref 8–16)
BASOPHILS # BLD: 1 %
BASOPHILS ABSOLUTE: 0.1 THOU/MM3 (ref 0–0.1)
BUN BLDV-MCNC: 11 MG/DL (ref 7–22)
CALCIUM SERPL-MCNC: 9.3 MG/DL (ref 8.5–10.5)
CHLORIDE BLD-SCNC: 105 MEQ/L (ref 98–111)
CHOLESTEROL, TOTAL: 174 MG/DL (ref 100–199)
CO2: 24 MEQ/L (ref 23–33)
CREAT SERPL-MCNC: 0.7 MG/DL (ref 0.4–1.2)
EOSINOPHIL # BLD: 5 %
EOSINOPHILS ABSOLUTE: 0.3 THOU/MM3 (ref 0–0.4)
ERYTHROCYTE [DISTWIDTH] IN BLOOD BY AUTOMATED COUNT: 13.1 % (ref 11.5–14.5)
ERYTHROCYTE [DISTWIDTH] IN BLOOD BY AUTOMATED COUNT: 45 FL (ref 35–45)
GFR SERPL CREATININE-BSD FRML MDRD: 83 ML/MIN/1.73M2
GLUCOSE BLD-MCNC: 99 MG/DL (ref 70–108)
HCT VFR BLD CALC: 45.9 % (ref 37–47)
HDLC SERPL-MCNC: 61 MG/DL
HEMOGLOBIN: 15.5 GM/DL (ref 12–16)
IMMATURE GRANS (ABS): 0.02 THOU/MM3 (ref 0–0.07)
IMMATURE GRANULOCYTES: 0.4 %
LDL CHOLESTEROL CALCULATED: 97 MG/DL
LYMPHOCYTES # BLD: 23.7 %
LYMPHOCYTES ABSOLUTE: 1.2 THOU/MM3 (ref 1–4.8)
MCH RBC QN AUTO: 31.3 PG (ref 26–33)
MCHC RBC AUTO-ENTMCNC: 33.8 GM/DL (ref 32.2–35.5)
MCV RBC AUTO: 92.5 FL (ref 81–99)
MONOCYTES # BLD: 11.6 %
MONOCYTES ABSOLUTE: 0.6 THOU/MM3 (ref 0.4–1.3)
NUCLEATED RED BLOOD CELLS: 0 /100 WBC
PLATELET # BLD: 332 THOU/MM3 (ref 130–400)
PMV BLD AUTO: 9.5 FL (ref 9.4–12.4)
POTASSIUM SERPL-SCNC: 4.5 MEQ/L (ref 3.5–5.2)
RBC # BLD: 4.96 MILL/MM3 (ref 4.2–5.4)
SEG NEUTROPHILS: 58.3 %
SEGMENTED NEUTROPHILS ABSOLUTE COUNT: 3 THOU/MM3 (ref 1.8–7.7)
SODIUM BLD-SCNC: 141 MEQ/L (ref 135–145)
TRIGL SERPL-MCNC: 80 MG/DL (ref 0–199)
WBC # BLD: 5.2 THOU/MM3 (ref 4.8–10.8)

## 2020-09-02 PROCEDURE — 85025 COMPLETE CBC W/AUTO DIFF WBC: CPT

## 2020-09-02 PROCEDURE — 36415 COLL VENOUS BLD VENIPUNCTURE: CPT

## 2020-09-02 PROCEDURE — 80061 LIPID PANEL: CPT

## 2020-09-02 PROCEDURE — 80048 BASIC METABOLIC PNL TOTAL CA: CPT

## 2021-05-24 PROBLEM — M15.9 OSTEOARTHRITIS OF MULTIPLE JOINTS: Status: ACTIVE | Noted: 2021-05-24

## 2021-05-24 PROBLEM — J44.9 COPD (CHRONIC OBSTRUCTIVE PULMONARY DISEASE) (HCC): Status: ACTIVE | Noted: 2021-05-24

## 2021-06-09 ENCOUNTER — HOSPITAL ENCOUNTER (OUTPATIENT)
Dept: MAMMOGRAPHY | Age: 71
Discharge: HOME | End: 2021-06-09
Payer: MEDICARE

## 2021-06-09 DIAGNOSIS — Z12.31 VISIT FOR SCREENING MAMMOGRAM: ICD-10-CM

## 2021-06-09 PROCEDURE — 77063 BREAST TOMOSYNTHESIS BI: CPT

## 2021-09-02 ENCOUNTER — HOSPITAL ENCOUNTER (OUTPATIENT)
Age: 71
Discharge: HOME | End: 2021-09-02
Payer: MEDICARE

## 2021-09-02 DIAGNOSIS — I10 ESSENTIAL HYPERTENSION: ICD-10-CM

## 2021-09-02 DIAGNOSIS — Z51.81 MEDICATION MONITORING ENCOUNTER: ICD-10-CM

## 2021-09-02 DIAGNOSIS — Z13.6 SCREENING FOR ISCHEMIC HEART DISEASE: ICD-10-CM

## 2021-09-02 LAB
ALBUMIN SERPL-MCNC: 4.4 G/DL (ref 3.5–5.1)
ALP BLD-CCNC: 79 U/L (ref 38–126)
ALT SERPL-CCNC: 19 U/L (ref 11–66)
ANION GAP SERPL CALCULATED.3IONS-SCNC: 11 MEQ/L (ref 8–16)
AST SERPL-CCNC: 23 U/L (ref 5–40)
BASOPHILS # BLD: 0.7 %
BASOPHILS ABSOLUTE: 0.1 THOU/MM3 (ref 0–0.1)
BILIRUB SERPL-MCNC: 0.5 MG/DL (ref 0.3–1.2)
BILIRUBIN DIRECT: < 0.2 MG/DL (ref 0–0.3)
BUN BLDV-MCNC: 7 MG/DL (ref 7–22)
CALCIUM SERPL-MCNC: 9.6 MG/DL (ref 8.5–10.5)
CHLORIDE BLD-SCNC: 102 MEQ/L (ref 98–111)
CHOLESTEROL, TOTAL: 172 MG/DL (ref 100–199)
CO2: 27 MEQ/L (ref 23–33)
CREAT SERPL-MCNC: 0.7 MG/DL (ref 0.4–1.2)
EOSINOPHIL # BLD: 2.8 %
EOSINOPHILS ABSOLUTE: 0.2 THOU/MM3 (ref 0–0.4)
ERYTHROCYTE [DISTWIDTH] IN BLOOD BY AUTOMATED COUNT: 13.3 % (ref 11.5–14.5)
ERYTHROCYTE [DISTWIDTH] IN BLOOD BY AUTOMATED COUNT: 44.8 FL (ref 35–45)
GFR SERPL CREATININE-BSD FRML MDRD: 82 ML/MIN/1.73M2
GLUCOSE BLD-MCNC: 103 MG/DL (ref 70–108)
HCT VFR BLD CALC: 46 % (ref 37–47)
HDLC SERPL-MCNC: 66 MG/DL
HEMOGLOBIN: 16 GM/DL (ref 12–16)
IMMATURE GRANS (ABS): 0.02 THOU/MM3 (ref 0–0.07)
IMMATURE GRANULOCYTES: 0.3 %
LDL CHOLESTEROL CALCULATED: 86 MG/DL
LYMPHOCYTES # BLD: 13 %
LYMPHOCYTES ABSOLUTE: 1 THOU/MM3 (ref 1–4.8)
MCH RBC QN AUTO: 31.6 PG (ref 26–33)
MCHC RBC AUTO-ENTMCNC: 34.8 GM/DL (ref 32.2–35.5)
MCV RBC AUTO: 90.9 FL (ref 81–99)
MONOCYTES # BLD: 10 %
MONOCYTES ABSOLUTE: 0.8 THOU/MM3 (ref 0.4–1.3)
NUCLEATED RED BLOOD CELLS: 0 /100 WBC
PLATELET # BLD: 319 THOU/MM3 (ref 130–400)
PMV BLD AUTO: 9.3 FL (ref 9.4–12.4)
POTASSIUM SERPL-SCNC: 4.7 MEQ/L (ref 3.5–5.2)
RBC # BLD: 5.06 MILL/MM3 (ref 4.2–5.4)
SEG NEUTROPHILS: 73.2 %
SEGMENTED NEUTROPHILS ABSOLUTE COUNT: 5.5 THOU/MM3 (ref 1.8–7.7)
SODIUM BLD-SCNC: 140 MEQ/L (ref 135–145)
TOTAL PROTEIN: 7 G/DL (ref 6.1–8)
TRIGL SERPL-MCNC: 100 MG/DL (ref 0–199)
WBC # BLD: 7.5 THOU/MM3 (ref 4.8–10.8)

## 2021-09-02 PROCEDURE — 82248 BILIRUBIN DIRECT: CPT

## 2021-09-02 PROCEDURE — 85025 COMPLETE CBC W/AUTO DIFF WBC: CPT

## 2021-09-02 PROCEDURE — 80053 COMPREHEN METABOLIC PANEL: CPT

## 2021-09-02 PROCEDURE — 80061 LIPID PANEL: CPT

## 2021-09-02 PROCEDURE — 36415 COLL VENOUS BLD VENIPUNCTURE: CPT

## 2022-05-16 ENCOUNTER — HOSPITAL ENCOUNTER (OUTPATIENT)
Dept: GENERAL RADIOLOGY | Age: 72
Discharge: HOME | End: 2022-05-16
Payer: MEDICARE

## 2022-05-16 ENCOUNTER — HOSPITAL ENCOUNTER (OUTPATIENT)
Age: 72
Discharge: HOME | End: 2022-05-16
Payer: MEDICARE

## 2022-05-16 DIAGNOSIS — M25.552 LEFT HIP PAIN: ICD-10-CM

## 2022-05-16 PROCEDURE — 73502 X-RAY EXAM HIP UNI 2-3 VIEWS: CPT

## 2022-05-31 ENCOUNTER — HOSPITAL ENCOUNTER (OUTPATIENT)
Dept: MRI IMAGING | Age: 72
Discharge: HOME | End: 2022-05-31
Payer: MEDICARE

## 2022-05-31 DIAGNOSIS — M48.061 SPINAL STENOSIS OF LUMBAR REGION, UNSPECIFIED WHETHER NEUROGENIC CLAUDICATION PRESENT: ICD-10-CM

## 2022-05-31 DIAGNOSIS — M54.50 LOW BACK PAIN WITH RADIATION: ICD-10-CM

## 2022-05-31 PROCEDURE — 72148 MRI LUMBAR SPINE W/O DYE: CPT

## 2022-06-13 ENCOUNTER — HOSPITAL ENCOUNTER (OUTPATIENT)
Dept: MAMMOGRAPHY | Age: 72
Discharge: HOME | End: 2022-06-13
Payer: MEDICARE

## 2022-06-13 DIAGNOSIS — Z12.31 VISIT FOR SCREENING MAMMOGRAM: ICD-10-CM

## 2022-06-13 PROCEDURE — 77063 BREAST TOMOSYNTHESIS BI: CPT

## 2022-07-14 NOTE — PROGRESS NOTES
PAT appointment reminder call  Arrival time and location given 7/21 at 10:30 in PAT  Bring drivers license and insurance  Bring list of medications with dosage and frequency  If possible bring caregiver for appointment  Appointment may last 2 hours

## 2022-07-21 ENCOUNTER — HOSPITAL ENCOUNTER (OUTPATIENT)
Dept: GENERAL RADIOLOGY | Age: 72
Discharge: HOME | End: 2022-07-21
Payer: MEDICARE

## 2022-07-21 ENCOUNTER — HOSPITAL ENCOUNTER (OUTPATIENT)
Dept: PREADMISSION TESTING | Age: 72
Discharge: HOME | End: 2022-07-25
Payer: MEDICARE

## 2022-07-21 VITALS
TEMPERATURE: 97.9 F | OXYGEN SATURATION: 96 % | RESPIRATION RATE: 16 BRPM | SYSTOLIC BLOOD PRESSURE: 159 MMHG | WEIGHT: 168.43 LBS | BODY MASS INDEX: 31 KG/M2 | DIASTOLIC BLOOD PRESSURE: 78 MMHG | HEART RATE: 88 BPM | HEIGHT: 62 IN

## 2022-07-21 DIAGNOSIS — Z01.818 PREOP TESTING: ICD-10-CM

## 2022-07-21 DIAGNOSIS — M48.061 SPINAL STENOSIS OF LUMBAR REGION, UNSPECIFIED WHETHER NEUROGENIC CLAUDICATION PRESENT: ICD-10-CM

## 2022-07-21 LAB
ANION GAP SERPL CALCULATED.3IONS-SCNC: 11 MEQ/L (ref 8–16)
APTT: 29.8 SECONDS (ref 22–38)
BUN BLDV-MCNC: 10 MG/DL (ref 7–22)
CALCIUM SERPL-MCNC: 10 MG/DL (ref 8.5–10.5)
CHLORIDE BLD-SCNC: 101 MEQ/L (ref 98–111)
CO2: 25 MEQ/L (ref 23–33)
CREAT SERPL-MCNC: 0.6 MG/DL (ref 0.4–1.2)
EKG ATRIAL RATE: 79 BPM
EKG P AXIS: 74 DEGREES
EKG P-R INTERVAL: 162 MS
EKG Q-T INTERVAL: 358 MS
EKG QRS DURATION: 70 MS
EKG QTC CALCULATION (BAZETT): 410 MS
EKG R AXIS: 55 DEGREES
EKG T AXIS: 67 DEGREES
EKG VENTRICULAR RATE: 79 BPM
ERYTHROCYTE [DISTWIDTH] IN BLOOD BY AUTOMATED COUNT: 13.2 % (ref 11.5–14.5)
ERYTHROCYTE [DISTWIDTH] IN BLOOD BY AUTOMATED COUNT: 43.3 FL (ref 35–45)
GFR SERPL CREATININE-BSD FRML MDRD: > 90 ML/MIN/1.73M2
GLUCOSE BLD-MCNC: 92 MG/DL (ref 70–108)
HCT VFR BLD CALC: 44.6 % (ref 37–47)
HEMOGLOBIN: 15.3 GM/DL (ref 12–16)
INR BLD: 0.84 (ref 0.85–1.13)
MCH RBC QN AUTO: 30.9 PG (ref 26–33)
MCHC RBC AUTO-ENTMCNC: 34.3 GM/DL (ref 32.2–35.5)
MCV RBC AUTO: 90.1 FL (ref 81–99)
MRSA NASAL SCREEN RT-PCR: NEGATIVE
PLATELET # BLD: 343 THOU/MM3 (ref 130–400)
PMV BLD AUTO: 8.7 FL (ref 9.4–12.4)
POTASSIUM SERPL-SCNC: 4.6 MEQ/L (ref 3.5–5.2)
RBC # BLD: 4.95 MILL/MM3 (ref 4.2–5.4)
SODIUM BLD-SCNC: 137 MEQ/L (ref 135–145)
STAPH AUREUS SCREEN RT-PCR: POSITIVE
WBC # BLD: 5.9 THOU/MM3 (ref 4.8–10.8)

## 2022-07-21 PROCEDURE — 80048 BASIC METABOLIC PNL TOTAL CA: CPT

## 2022-07-21 PROCEDURE — 93010 ELECTROCARDIOGRAM REPORT: CPT | Performed by: INTERNAL MEDICINE

## 2022-07-21 PROCEDURE — 87641 MR-STAPH DNA AMP PROBE: CPT

## 2022-07-21 PROCEDURE — 87640 STAPH A DNA AMP PROBE: CPT

## 2022-07-21 PROCEDURE — 93005 ELECTROCARDIOGRAM TRACING: CPT | Performed by: ORTHOPAEDIC SURGERY

## 2022-07-21 PROCEDURE — 85730 THROMBOPLASTIN TIME PARTIAL: CPT

## 2022-07-21 PROCEDURE — 85027 COMPLETE CBC AUTOMATED: CPT

## 2022-07-21 PROCEDURE — 85610 PROTHROMBIN TIME: CPT

## 2022-07-21 PROCEDURE — 71046 X-RAY EXAM CHEST 2 VIEWS: CPT

## 2022-07-21 RX ORDER — ALPRAZOLAM 0.25 MG/1
0.25 TABLET ORAL 3 TIMES DAILY PRN
COMMUNITY
End: 2022-07-25 | Stop reason: SDUPTHER

## 2022-07-21 RX ORDER — TRAMADOL HYDROCHLORIDE 50 MG/1
50 TABLET ORAL EVERY 6 HOURS PRN
Status: ON HOLD | COMMUNITY
End: 2022-08-18 | Stop reason: HOSPADM

## 2022-07-21 RX ORDER — NAPROXEN SODIUM 220 MG
440 TABLET ORAL NIGHTLY
Status: ON HOLD | COMMUNITY
End: 2022-08-18 | Stop reason: HOSPADM

## 2022-07-21 RX ORDER — SENNOSIDES 8.6 MG
650 CAPSULE ORAL EVERY 8 HOURS PRN
COMMUNITY

## 2022-07-21 ASSESSMENT — PAIN SCALES - GENERAL: PAINLEVEL_OUTOF10: 8

## 2022-07-21 ASSESSMENT — PAIN DESCRIPTION - PAIN TYPE: TYPE: CHRONIC PAIN

## 2022-07-21 ASSESSMENT — PAIN - FUNCTIONAL ASSESSMENT: PAIN_FUNCTIONAL_ASSESSMENT: PREVENTS OR INTERFERES SOME ACTIVE ACTIVITIES AND ADLS

## 2022-07-21 ASSESSMENT — PAIN DESCRIPTION - LOCATION: LOCATION: BACK;LEG

## 2022-07-21 ASSESSMENT — PAIN DESCRIPTION - FREQUENCY: FREQUENCY: INTERMITTENT

## 2022-07-21 ASSESSMENT — PAIN DESCRIPTION - ORIENTATION: ORIENTATION: LOWER;LEFT

## 2022-07-21 NOTE — PROGRESS NOTES
Preliminary Discharge Planning Questionnaire  Date of [id number]   Surgeon Dr Kristyn Edwards      Having the proper help and care after surgery is very important to your recovery. Who will be able to help you at home when you are discharged from the hospital? ([facility] - 24/7 for a minimum of 2 weeks)   alone   Name(s)      How many steps to enter your home?  4 with ramp  to trailer door      Bathroom on first floor? Yes    Bedroom on the first floor? Yes    Do you have an elevated toilet seat to use at home? No    Do you have a walker to use at home? Total Joints - with wheels N/A   Spine - with wheels  Yes     Have you been doing home exercises?no      *You will go home with some outpatient physical therapy, where do you prefer to go? [facility] care      *If needed, what home health agency would you like to use?    [facility]

## 2022-07-21 NOTE — PROGRESS NOTES
Drains  Pt was instructed and did a return demo on how to empty both ADAM drain and Hemovac drain. Stress importance on how to measure and record it for the doctor to see how much drainage patient has from drain. Lyndsay Fleming (ADAM) Drain Care:  Keep ADAM drain compressed   When emptying drain strip or milk tubing  Empty 2 times a day and as needed  Record amount on wound drainage sheet and take to your follow up appointment  If your drain doesn't stay compressed notify your Physician     Daughter is up on how to use drain she had it herself.

## 2022-07-21 NOTE — PROGRESS NOTES
Lumbar Spine Surgery Pre-op Instructions  Nothing to eat or drink after midnight  Bring your medications in the original bottles   Bring photo ID and any health insurance cards  Wear comfortable clean loose fitting clothing  Do not wear any jewelry   Bring your walker  Bring your back brace if you were given one  Wear clean clothes to bed and place fresh clean sheets and pillowcases on your bed the night before surgery  Patient viewed physical therapy video  Routine preop instructions given for pain, hand hygiene, fall prevention, infection prevention, anesthesia, cough and deep breath, and progressive diet and ambulation  Showering:  Shower 2 days before, day before and morning of surgery using the StartClean® kit provided (follow the instructions provided with the kit),   Do not shave the surgical area! If needed, your nurse will use clippers to remove any excess hair at the surgical site when you come in for surgery. Do not use a razor on the site of your surgery for at least 2 days prior to surgery because shaving can leave tiny nicks in the skin which may allow germs to enter and cause infection. Perform the exercises given in your booklet 3 times daily starting today     STARTCLEAN® KIT SHOWER INSTRUCITONS    ___0-29-76_____ (date)   FIRST SHOWER: Two days before surgery: Take a shower and wash your entire body, including your hair and scalp in the following manner:  Wash your hair using normal shampoo. Make sure you rinse the shampoo from your hair and body. Wash your face with your regular soap or cleanser. Use one of the sponges in the [facility] kit and apply 1/3 of the Chlorhexidine soap to the sponge, wash from your neck down. This is very important. Do not use the soap on your face or hair and avoid private areas. Rinse your body thoroughly. This is very important. Using a fresh, clean towel, dry your body. Dress in freshly washed clothes.   Do not use lotions, powders, or creams after this shower. _8-11_______ (date)   SECOND SHOWER: The day before surgery: Take a shower and wash your entire body, including your hair and scalp in the following manner:  Wash your hair using normal shampoo. Make sure you rinse the shampoo from your hair and body. Wash your face with your regular soap or cleanser. Use one of the sponges in the [facility] kit and apply 1/3 of the Chlorhexidine soap to the sponge, wash from your neck down. This is very important. Do not use the soap on your face or hair and avoid private areas. Rinse your body thoroughly. This is very important. Using a fresh, clean towel, dry your body. Dress in freshly washed clothes. Fresh clean sheets and pillow cases should be used after this shower. Do not use lotions, powders, or creams after this shower. __3-83-17______ (date)   THE FINAL SHOWER: The morning of surgery: Take a shower and wash your entire body, including your hair and scalp in the following manner:  Wash your hair using normal shampoo. Make sure you rinse the shampoo from your hair and body. Wash your face with your regular soap or cleanser. Use one of the sponges in the [facility] kit and apply 1/3 of the Chlorhexidine soap to the sponge, wash from your neck down. This is very important. Do not use the soap on your face or hair and avoid private areas. Rinse your body thoroughly. This is very important. Using a fresh, clean towel, dry your body. Dress warmly with freshly washed clean clothes. Keeping warm before surgery decreases your risk of developing and infection. Do not use lotions, powders, or creams after this shower. Keeping You Safe for Surgery    Staphylococcus aureus or Wilhemina Aloe is a germ that lives on the skin and in the nose of some healthy people. Your skin protects you from those germs. However, when you have surgery, we will be cutting your skin. Sometimes germs can get into those cuts and cause infection.     How do we screen for Staph? We will swab your nose to see if you are a carrier of Staph. The results will be available about 2 hours after we obtain the nasal specimen. A positive test does not mean you have an infection; it just means you are a carrier of Staph. Your surgery most likely will not be canceled or delayed. If my test is positive, what happens? If your test is positive, a nurse will call you and tell you to get Mupirocin Ointment (Bactroban) at your pharmacy. The medicine may come in one large tube or may come in many small packets. When the medication is administered into your nose, it works by killing some of the germs that could cause you to get a surgical site infection. If you get a big tube of Mupirocin, place enough medicine to cover the tip of a cotton swab (Q-tip). Place the cotton swab inside one nostril and rub the medicine onto the inside of the nose. Then repeat this same procedure in your other nostril. If you get small individual tubes, put half the tube on a cotton swab and put the medicine in one nostril. Then the other half in the other nostril. After the medication has been inserted into each nostril, gently press your nose together and release for about a minute to get the medicine all over the inside of your nose. Do this once in the morning and once at night for 5 days prior to your scheduled surgery date. If I have Staph, will I be treated differently in the hospital?  If you have a certain type of Staph called MRSA (Methicillin-Resistant Staph aureus), you will be in a single room on Contact Precautions.  This means your doctors and nurses will wear gloves and gowns when taking care of you. We do this (along with good hand hygiene) to make sure we do not spread MRSA to any another patients in our care.

## 2022-07-22 NOTE — PROGRESS NOTES
Mupirocin ointment 22 grams BID to both nostrils for 5 days. No refills  called to [facility] spoke to milena per pt request.  Called pt to notify of medication to .

## 2022-08-11 NOTE — H&P
History and Physical    Jackie Bianchi (1950)  8/11/2022      CHIEF COMPLAINT:  Back pain    HISTORY OF PRESENT ILLNESS:    The patient is a 66-year-old female with complaints of constant low back pain that radiates pain into the left groin and left anterior thigh and down the left lateral leg to the ankle. Symptoms have been present on a chronic basis and worsening. Current VAS score 8/10. Percentage wise, 50% pain in the back and 50% left leg. Activities that aggravate the pain include standing. Rest helps relieve the pain. Modifying factors include medication management, PT and MARIA DEL CARMEN with really no relief. No bowel or bladder changes. Former smoker. She had a prior back surgery in 1976    PCP: Joseph Cummings MD    Past Medical History:        Diagnosis Date    Anxiety     Arthritis     [facility]    CERVICAL CANCER    COPD (chronic obstructive pulmonary disease) ([facility] 05/24/2021    Endometrial cancer (HCC)     cervical CA at age 32    Hypertension      Past Surgical History:        Procedure Laterality Date    BACK SURGERY  1976    COLONOSCOPY      HYSTERECTOMY (CERVIX STATUS UNKNOWN)  1978    JOINT REPLACEMENT Left 05/18/2017    Lt total shoulder , Dr Jed Edge Right 02/2018     Current Medications:   Prior to Admission medications    Medication Sig Start Date End Date Taking? Authorizing Provider   ALPRAZolam (XANAX) 0.25 MG tablet Take 1 tablet by mouth 3 times daily as needed for Sleep for up to 30 days. Pt states take one in am 7/25/22 8/24/22  Brett Mccallum MD   naproxen sodium (ANAPROX) 220 MG tablet Take 440 mg by mouth at bedtime    Historical Provider, MD   acetaminophen (TYLENOL) 650 MG extended release tablet Take 650 mg by mouth every 8 hours as needed for Pain    Historical Provider, MD   traMADol (ULTRAM) 50 MG tablet Take 50 mg by mouth every 6 hours as needed for Pain.     Historical Provider, MD   vitamin D 25 MCG (1000 UT) CAPS Take by mouth Historical Provider, MD   famotidine (PEPCID) 20 MG tablet Take 20 mg by mouth daily    Historical Provider, MD   amLODIPine (NORVASC) 5 MG tablet take 1/2 tablet by mouth once daily 9/1/21   Abel Evans MD   diphenhydrAMINE (BENADRYL) 25 MG tablet Take 25 mg by mouth every 6 hours as needed for Itching or Allergies    Historical Provider, MD   docusate sodium (COLACE) 100 MG capsule Take 100 mg by mouth daily    Historical Provider, MD   Multiple Vitamins-Minerals (THERAPEUTIC MULTIVITAMIN-MINERALS) tablet Take 1 tablet by mouth daily. Historical Provider, MD    D@  Allergies:  Codeine    Social History:   TOBACCO:   reports that she quit smoking about 14 years ago. Her smoking use included cigarettes. She has never used smokeless tobacco.  ETOH:   reports current alcohol use of about 4.0 standard drinks per week. DRUGS:   reports no history of drug use. Family History:       Problem Relation Age of Onset    COPD Mother     Emphysema Mother     Other Father         diverticulitis    Breast Cancer Maternal Aunt 46    Breast Cancer Maternal Aunt 62       REVIEW OF SYSTEMS:    CONSTITUTIONAL:  negative for fevers, chills  RESPIRATORY:  negative for cough and shortness of breath  CARDIOVASCULAR:  negative for chest pain, palpitations  GASTROINTESTINAL:  negative for nausea, vomiting, incontinence  GENITOURINARY:  negative for frequency and urinary incontinence  MUSCULOSKELETAL:  (+) for back pain, leg pain  NEUROLOGICAL:  negative for numbness/tingling, headaches  BEHAVIOR/PSYCH:  negative for depressed mood, increased anxiety    PHYSICAL EXAM:    VITAL SIGNS: Ht 5'2\", Wt 180 lbs, BMI 32.92.  CONSTITUTIONAL:  Awake, alert, cooperative, no apparent distress, and appears stated age. Antalgic gait   HEAD: Atraumatic, normocephalic  LUNGS:  No respiratory distress. CTA bilaterally.  No wheezes, rales, rhonchi  CARDIOVASCULAR:  Regular rate and rhythm, no murmur  ABDOMEN:  Normal bowel sounds, soft, non-distended, non-tender  SPINE: Limited lumbar ROM. Inspection of the spine shows no skin lesions or open wounds. TTP lumbar spine. MUSCULOSKELETAL:  There is no redness, warmth, or swelling of the joints. Full range of motion noted. Motor strength is 5 out of 5 bilateral LE.    NEUROLOGIC:  Awake, alert, oriented to name, place and time. Sensory is intact bilateral LE. DATA:    CBC:   Lab Results   Component Value Date/Time    WBC 5.9 07/21/2022 01:04 PM    RBC 4.95 07/21/2022 01:04 PM    RBC 4.49 07/15/2011 03:09 PM    HGB 15.3 07/21/2022 01:04 PM    HCT 44.6 07/21/2022 01:04 PM    MCV 90.1 07/21/2022 01:04 PM    MCH 30.9 07/21/2022 01:04 PM    MCHC 34.3 07/21/2022 01:04 PM    RDW 14.3 08/10/2017 11:55 AM     07/21/2022 01:04 PM    MPV 8.7 07/21/2022 01:04 PM     WBC:    Lab Results   Component Value Date/Time    WBC 5.9 07/21/2022 01:04 PM     Hemoglobin/Hematocrit:    Lab Results   Component Value Date/Time    HGB 15.3 07/21/2022 01:04 PM    HCT 44.6 07/21/2022 01:04 PM     CMP:    Lab Results   Component Value Date/Time     07/21/2022 10:34 AM    K 4.6 07/21/2022 10:34 AM     07/21/2022 10:34 AM    CO2 25 07/21/2022 10:34 AM    BUN 10 07/21/2022 10:34 AM    CREATININE 0.6 07/21/2022 10:34 AM    LABGLOM >90 07/21/2022 10:34 AM    GLUCOSE 92 07/21/2022 10:34 AM    GLUCOSE 92 07/15/2011 03:09 PM    PROT 7.0 09/02/2021 11:10 AM    LABALBU 4.4 09/02/2021 11:10 AM    LABALBU 4.5 07/15/2011 03:09 PM    CALCIUM 10.0 07/21/2022 10:34 AM    BILITOT 0.5 09/02/2021 11:10 AM    ALKPHOS 79 09/02/2021 11:10 AM    AST 23 09/02/2021 11:10 AM    ALT 19 09/02/2021 11:10 AM       Radiology:  MRI Lumbar Spine dated 5/31/2022      Impression           1.  Degenerative changes in the lumbar spine resulting in moderate to severe canal and bilateral foraminal stenosis at L1-2 and L2-3.   2. There is moderate to severe canal, severe right and moderate to severe left-sided foraminal stenosis at L3-4.   3. Possible postoperative changes at L4-5. There is mild canal and moderate to severe bilateral foraminal stenosis at this level. 4. There is mild canal and moderate bilateral foraminal stenosis at L5-S1.   5. There is mild canal, moderate right and mild-to-moderate left-sided foraminal stenosis at T12-L1. 6. There are  disc protrusions at T10-11 and T11-12 resulting in mild canal stenosis with no definite compression upon the underlying spinal cord. 7. There is degenerative change involving the sacroiliac joints bilaterally. IMPRESSION/RECOMMENDATIONS:    Assessment:   1) L1-S1 degenerative disc disease, facet arthropathy, disc protrusions with stenosis and neurogenic claudication.     Plan:  1) L1-L5 decompression and fusion    SAVANNA Pena

## 2022-08-12 ENCOUNTER — ANESTHESIA EVENT (OUTPATIENT)
Dept: OPERATING ROOM | Age: 72
DRG: 460 | End: 2022-08-12
Payer: MEDICARE

## 2022-08-12 ENCOUNTER — HOSPITAL ENCOUNTER (INPATIENT)
Age: 72
LOS: 7 days | Discharge: NURSING, REHAB OR LONG-TERM CARE | DRG: 460 | End: 2022-08-19
Attending: ORTHOPAEDIC SURGERY | Admitting: ORTHOPAEDIC SURGERY
Payer: MEDICARE

## 2022-08-12 ENCOUNTER — APPOINTMENT (OUTPATIENT)
Dept: GENERAL RADIOLOGY | Age: 72
DRG: 460 | End: 2022-08-12
Attending: ORTHOPAEDIC SURGERY
Payer: MEDICARE

## 2022-08-12 ENCOUNTER — ANESTHESIA (OUTPATIENT)
Dept: OPERATING ROOM | Age: 72
DRG: 460 | End: 2022-08-12
Payer: MEDICARE

## 2022-08-12 DIAGNOSIS — M48.062 LUMBAR STENOSIS WITH NEUROGENIC CLAUDICATION: Primary | ICD-10-CM

## 2022-08-12 DIAGNOSIS — F41.9 ANXIETY DISORDER, UNSPECIFIED TYPE: ICD-10-CM

## 2022-08-12 LAB
ABO: NORMAL
ANTIBODY SCREEN: NORMAL
RH FACTOR: NORMAL

## 2022-08-12 PROCEDURE — 3700000000 HC ANESTHESIA ATTENDED CARE: Performed by: ORTHOPAEDIC SURGERY

## 2022-08-12 PROCEDURE — 86900 BLOOD TYPING SEROLOGIC ABO: CPT

## 2022-08-12 PROCEDURE — 6360000002 HC RX W HCPCS

## 2022-08-12 PROCEDURE — 2709999900 HC NON-CHARGEABLE SUPPLY: Performed by: ORTHOPAEDIC SURGERY

## 2022-08-12 PROCEDURE — 7100000001 HC PACU RECOVERY - ADDTL 15 MIN: Performed by: ORTHOPAEDIC SURGERY

## 2022-08-12 PROCEDURE — 2500000003 HC RX 250 WO HCPCS: Performed by: NURSE ANESTHETIST, CERTIFIED REGISTERED

## 2022-08-12 PROCEDURE — 86850 RBC ANTIBODY SCREEN: CPT

## 2022-08-12 PROCEDURE — 6360000002 HC RX W HCPCS: Performed by: ORTHOPAEDIC SURGERY

## 2022-08-12 PROCEDURE — 0SG1071 FUSION OF 2 OR MORE LUMBAR VERTEBRAL JOINTS WITH AUTOLOGOUS TISSUE SUBSTITUTE, POSTERIOR APPROACH, POSTERIOR COLUMN, OPEN APPROACH: ICD-10-PCS | Performed by: ORTHOPAEDIC SURGERY

## 2022-08-12 PROCEDURE — 86901 BLOOD TYPING SEROLOGIC RH(D): CPT

## 2022-08-12 PROCEDURE — 3600000004 HC SURGERY LEVEL 4 BASE: Performed by: ORTHOPAEDIC SURGERY

## 2022-08-12 PROCEDURE — 7100000000 HC PACU RECOVERY - FIRST 15 MIN: Performed by: ORTHOPAEDIC SURGERY

## 2022-08-12 PROCEDURE — 2720000010 HC SURG SUPPLY STERILE: Performed by: ORTHOPAEDIC SURGERY

## 2022-08-12 PROCEDURE — 1200000000 HC SEMI PRIVATE

## 2022-08-12 PROCEDURE — 3600000014 HC SURGERY LEVEL 4 ADDTL 15MIN: Performed by: ORTHOPAEDIC SURGERY

## 2022-08-12 PROCEDURE — 2580000003 HC RX 258: Performed by: PHYSICIAN ASSISTANT

## 2022-08-12 PROCEDURE — 72020 X-RAY EXAM OF SPINE 1 VIEW: CPT

## 2022-08-12 PROCEDURE — 6360000002 HC RX W HCPCS: Performed by: NURSE ANESTHETIST, CERTIFIED REGISTERED

## 2022-08-12 PROCEDURE — 4A1104G MONITORING OF PERIPHERAL NERVOUS ELECTRICAL ACTIVITY, INTRAOPERATIVE, OPEN APPROACH: ICD-10-PCS | Performed by: ORTHOPAEDIC SURGERY

## 2022-08-12 PROCEDURE — 2580000003 HC RX 258: Performed by: NURSE ANESTHETIST, CERTIFIED REGISTERED

## 2022-08-12 PROCEDURE — 6370000000 HC RX 637 (ALT 250 FOR IP): Performed by: PHYSICIAN ASSISTANT

## 2022-08-12 PROCEDURE — 01NB0ZZ RELEASE LUMBAR NERVE, OPEN APPROACH: ICD-10-PCS | Performed by: ORTHOPAEDIC SURGERY

## 2022-08-12 PROCEDURE — C1713 ANCHOR/SCREW BN/BN,TIS/BN: HCPCS | Performed by: ORTHOPAEDIC SURGERY

## 2022-08-12 PROCEDURE — 6360000002 HC RX W HCPCS: Performed by: ANESTHESIOLOGY

## 2022-08-12 PROCEDURE — 3700000001 HC ADD 15 MINUTES (ANESTHESIA): Performed by: ORTHOPAEDIC SURGERY

## 2022-08-12 PROCEDURE — 6360000002 HC RX W HCPCS: Performed by: PHYSICIAN ASSISTANT

## 2022-08-12 DEVICE — IMPLANTABLE DEVICE: Type: IMPLANTABLE DEVICE | Site: SPINE LUMBAR | Status: FUNCTIONAL

## 2022-08-12 DEVICE — CHIPS BNE GRFT 30CC 1-4MM ALLGRFT CANC FRZ DRY PRESERVATION: Type: IMPLANTABLE DEVICE | Site: SPINE LUMBAR | Status: FUNCTIONAL

## 2022-08-12 DEVICE — SCREW SPNL L45MM DIA6.5MM 60DEG THORLUM PEDCL TI ALLY: Type: IMPLANTABLE DEVICE | Site: SPINE LUMBAR | Status: FUNCTIONAL

## 2022-08-12 DEVICE — DURASEAL® EXACT SPINAL SEALANT SYSTEM 3ML 5 PACK
Type: IMPLANTABLE DEVICE | Site: SPINE LUMBAR | Status: FUNCTIONAL
Brand: DURASEAL EXACT SPINAL SEALANT SYSTEM

## 2022-08-12 DEVICE — CONNECTOR SPNL M STREAMLINE TL VAR X LINK: Type: IMPLANTABLE DEVICE | Site: SPINE LUMBAR | Status: FUNCTIONAL

## 2022-08-12 DEVICE — SCREW SPNL L40MM DIA6.5MM THORLUM TI ALLY POLYAX STREAMLINE: Type: IMPLANTABLE DEVICE | Site: SPINE LUMBAR | Status: FUNCTIONAL

## 2022-08-12 DEVICE — SET SCR SPNL TI STREAMLINE: Type: IMPLANTABLE DEVICE | Site: SPINE LUMBAR | Status: FUNCTIONAL

## 2022-08-12 RX ORDER — ROCURONIUM BROMIDE 10 MG/ML
INJECTION, SOLUTION INTRAVENOUS PRN
Status: DISCONTINUED | OUTPATIENT
Start: 2022-08-12 | End: 2022-08-12 | Stop reason: SDUPTHER

## 2022-08-12 RX ORDER — HYDROMORPHONE HCL 110MG/55ML
PATIENT CONTROLLED ANALGESIA SYRINGE INTRAVENOUS PRN
Status: DISCONTINUED | OUTPATIENT
Start: 2022-08-12 | End: 2022-08-12 | Stop reason: SDUPTHER

## 2022-08-12 RX ORDER — FENTANYL CITRATE 50 UG/ML
50 INJECTION, SOLUTION INTRAMUSCULAR; INTRAVENOUS EVERY 5 MIN PRN
Status: COMPLETED | OUTPATIENT
Start: 2022-08-12 | End: 2022-08-12

## 2022-08-12 RX ORDER — SODIUM PHOSPHATE, DIBASIC AND SODIUM PHOSPHATE, MONOBASIC 7; 19 G/133ML; G/133ML
1 ENEMA RECTAL DAILY PRN
Status: DISCONTINUED | OUTPATIENT
Start: 2022-08-12 | End: 2022-08-19 | Stop reason: HOSPADM

## 2022-08-12 RX ORDER — ALPRAZOLAM 0.25 MG/1
0.25 TABLET ORAL 3 TIMES DAILY PRN
Status: DISCONTINUED | OUTPATIENT
Start: 2022-08-12 | End: 2022-08-12

## 2022-08-12 RX ORDER — BISACODYL 10 MG
10 SUPPOSITORY, RECTAL RECTAL DAILY PRN
Status: DISCONTINUED | OUTPATIENT
Start: 2022-08-12 | End: 2022-08-19 | Stop reason: HOSPADM

## 2022-08-12 RX ORDER — OXYCODONE HYDROCHLORIDE 5 MG/1
10 TABLET ORAL EVERY 6 HOURS PRN
Status: DISCONTINUED | OUTPATIENT
Start: 2022-08-12 | End: 2022-08-13

## 2022-08-12 RX ORDER — FAMOTIDINE 20 MG/1
20 TABLET, FILM COATED ORAL DAILY
Status: DISCONTINUED | OUTPATIENT
Start: 2022-08-12 | End: 2022-08-19 | Stop reason: HOSPADM

## 2022-08-12 RX ORDER — SODIUM CHLORIDE 9 MG/ML
INJECTION, SOLUTION INTRAVENOUS CONTINUOUS
Status: DISCONTINUED | OUTPATIENT
Start: 2022-08-12 | End: 2022-08-13

## 2022-08-12 RX ORDER — SODIUM CHLORIDE 9 MG/ML
INJECTION, SOLUTION INTRAVENOUS PRN
Status: DISCONTINUED | OUTPATIENT
Start: 2022-08-12 | End: 2022-08-19 | Stop reason: HOSPADM

## 2022-08-12 RX ORDER — OXYCODONE HYDROCHLORIDE 5 MG/1
5 TABLET ORAL EVERY 6 HOURS PRN
Status: DISCONTINUED | OUTPATIENT
Start: 2022-08-12 | End: 2022-08-13

## 2022-08-12 RX ORDER — SENNA AND DOCUSATE SODIUM 50; 8.6 MG/1; MG/1
1 TABLET, FILM COATED ORAL 2 TIMES DAILY
Status: DISCONTINUED | OUTPATIENT
Start: 2022-08-12 | End: 2022-08-19 | Stop reason: HOSPADM

## 2022-08-12 RX ORDER — VANCOMYCIN HYDROCHLORIDE 1 G/20ML
INJECTION, POWDER, LYOPHILIZED, FOR SOLUTION INTRAVENOUS PRN
Status: DISCONTINUED | OUTPATIENT
Start: 2022-08-12 | End: 2022-08-12 | Stop reason: ALTCHOICE

## 2022-08-12 RX ORDER — SODIUM CHLORIDE 0.9 % (FLUSH) 0.9 %
5-40 SYRINGE (ML) INJECTION EVERY 12 HOURS SCHEDULED
Status: DISCONTINUED | OUTPATIENT
Start: 2022-08-12 | End: 2022-08-12 | Stop reason: HOSPADM

## 2022-08-12 RX ORDER — SODIUM CHLORIDE 9 MG/ML
INJECTION, SOLUTION INTRAVENOUS PRN
Status: DISCONTINUED | OUTPATIENT
Start: 2022-08-12 | End: 2022-08-12 | Stop reason: HOSPADM

## 2022-08-12 RX ORDER — FENTANYL CITRATE 50 UG/ML
INJECTION, SOLUTION INTRAMUSCULAR; INTRAVENOUS
Status: COMPLETED
Start: 2022-08-12 | End: 2022-08-12

## 2022-08-12 RX ORDER — LIDOCAINE HYDROCHLORIDE 20 MG/ML
INJECTION, SOLUTION INFILTRATION; PERINEURAL PRN
Status: DISCONTINUED | OUTPATIENT
Start: 2022-08-12 | End: 2022-08-12 | Stop reason: SDUPTHER

## 2022-08-12 RX ORDER — SODIUM CHLORIDE 9 MG/ML
INJECTION, SOLUTION INTRAVENOUS CONTINUOUS PRN
Status: DISCONTINUED | OUTPATIENT
Start: 2022-08-12 | End: 2022-08-12 | Stop reason: SDUPTHER

## 2022-08-12 RX ORDER — ONDANSETRON 2 MG/ML
4 INJECTION INTRAMUSCULAR; INTRAVENOUS
Status: DISCONTINUED | OUTPATIENT
Start: 2022-08-12 | End: 2022-08-12 | Stop reason: HOSPADM

## 2022-08-12 RX ORDER — AMLODIPINE BESYLATE 2.5 MG/1
2.5 TABLET ORAL DAILY
Status: DISCONTINUED | OUTPATIENT
Start: 2022-08-12 | End: 2022-08-19 | Stop reason: HOSPADM

## 2022-08-12 RX ORDER — PROPOFOL 10 MG/ML
INJECTION, EMULSION INTRAVENOUS CONTINUOUS PRN
Status: DISCONTINUED | OUTPATIENT
Start: 2022-08-12 | End: 2022-08-12

## 2022-08-12 RX ORDER — PROPOFOL 10 MG/ML
INJECTION, EMULSION INTRAVENOUS CONTINUOUS PRN
Status: DISCONTINUED | OUTPATIENT
Start: 2022-08-12 | End: 2022-08-12 | Stop reason: SDUPTHER

## 2022-08-12 RX ORDER — MORPHINE SULFATE 2 MG/ML
2 INJECTION, SOLUTION INTRAMUSCULAR; INTRAVENOUS
Status: DISCONTINUED | OUTPATIENT
Start: 2022-08-12 | End: 2022-08-19 | Stop reason: HOSPADM

## 2022-08-12 RX ORDER — ONDANSETRON 2 MG/ML
INJECTION INTRAMUSCULAR; INTRAVENOUS PRN
Status: DISCONTINUED | OUTPATIENT
Start: 2022-08-12 | End: 2022-08-12 | Stop reason: SDUPTHER

## 2022-08-12 RX ORDER — MEPERIDINE HYDROCHLORIDE 25 MG/ML
12.5 INJECTION INTRAMUSCULAR; INTRAVENOUS; SUBCUTANEOUS EVERY 5 MIN PRN
Status: DISCONTINUED | OUTPATIENT
Start: 2022-08-12 | End: 2022-08-12 | Stop reason: HOSPADM

## 2022-08-12 RX ORDER — SODIUM CHLORIDE 0.9 % (FLUSH) 0.9 %
5-40 SYRINGE (ML) INJECTION PRN
Status: DISCONTINUED | OUTPATIENT
Start: 2022-08-12 | End: 2022-08-12 | Stop reason: HOSPADM

## 2022-08-12 RX ORDER — ONDANSETRON 4 MG/1
4 TABLET, ORALLY DISINTEGRATING ORAL EVERY 8 HOURS PRN
Status: DISCONTINUED | OUTPATIENT
Start: 2022-08-12 | End: 2022-08-19 | Stop reason: HOSPADM

## 2022-08-12 RX ORDER — ALPRAZOLAM 0.25 MG/1
0.25 TABLET ORAL 3 TIMES DAILY PRN
Status: DISCONTINUED | OUTPATIENT
Start: 2022-08-12 | End: 2022-08-19

## 2022-08-12 RX ORDER — ACETAMINOPHEN 325 MG/1
650 TABLET ORAL EVERY 6 HOURS PRN
Status: DISCONTINUED | OUTPATIENT
Start: 2022-08-12 | End: 2022-08-19 | Stop reason: HOSPADM

## 2022-08-12 RX ORDER — DIPHENHYDRAMINE HCL 25 MG
25 TABLET ORAL EVERY 6 HOURS PRN
Status: DISCONTINUED | OUTPATIENT
Start: 2022-08-12 | End: 2022-08-19 | Stop reason: HOSPADM

## 2022-08-12 RX ORDER — CYCLOBENZAPRINE HCL 10 MG
10 TABLET ORAL 3 TIMES DAILY PRN
Status: DISCONTINUED | OUTPATIENT
Start: 2022-08-12 | End: 2022-08-19 | Stop reason: HOSPADM

## 2022-08-12 RX ORDER — MORPHINE SULFATE 4 MG/ML
4 INJECTION, SOLUTION INTRAMUSCULAR; INTRAVENOUS
Status: DISCONTINUED | OUTPATIENT
Start: 2022-08-12 | End: 2022-08-19 | Stop reason: HOSPADM

## 2022-08-12 RX ORDER — ONDANSETRON 2 MG/ML
4 INJECTION INTRAMUSCULAR; INTRAVENOUS EVERY 6 HOURS PRN
Status: DISCONTINUED | OUTPATIENT
Start: 2022-08-12 | End: 2022-08-19 | Stop reason: HOSPADM

## 2022-08-12 RX ORDER — FENTANYL CITRATE 50 UG/ML
INJECTION, SOLUTION INTRAMUSCULAR; INTRAVENOUS PRN
Status: DISCONTINUED | OUTPATIENT
Start: 2022-08-12 | End: 2022-08-12 | Stop reason: SDUPTHER

## 2022-08-12 RX ORDER — SODIUM CHLORIDE 0.9 % (FLUSH) 0.9 %
5-40 SYRINGE (ML) INJECTION EVERY 12 HOURS SCHEDULED
Status: DISCONTINUED | OUTPATIENT
Start: 2022-08-12 | End: 2022-08-19 | Stop reason: HOSPADM

## 2022-08-12 RX ORDER — DEXAMETHASONE SODIUM PHOSPHATE 4 MG/ML
INJECTION, SOLUTION INTRA-ARTICULAR; INTRALESIONAL; INTRAMUSCULAR; INTRAVENOUS; SOFT TISSUE PRN
Status: DISCONTINUED | OUTPATIENT
Start: 2022-08-12 | End: 2022-08-12 | Stop reason: SDUPTHER

## 2022-08-12 RX ORDER — PROPOFOL 10 MG/ML
INJECTION, EMULSION INTRAVENOUS PRN
Status: DISCONTINUED | OUTPATIENT
Start: 2022-08-12 | End: 2022-08-12 | Stop reason: SDUPTHER

## 2022-08-12 RX ORDER — POLYETHYLENE GLYCOL 3350 17 G/17G
17 POWDER, FOR SOLUTION ORAL DAILY
Status: DISCONTINUED | OUTPATIENT
Start: 2022-08-12 | End: 2022-08-19 | Stop reason: HOSPADM

## 2022-08-12 RX ORDER — SODIUM CHLORIDE 0.9 % (FLUSH) 0.9 %
5-40 SYRINGE (ML) INJECTION PRN
Status: DISCONTINUED | OUTPATIENT
Start: 2022-08-12 | End: 2022-08-19 | Stop reason: HOSPADM

## 2022-08-12 RX ORDER — AMLODIPINE BESYLATE 5 MG/1
5 TABLET ORAL DAILY
Status: DISCONTINUED | OUTPATIENT
Start: 2022-08-12 | End: 2022-08-12

## 2022-08-12 RX ORDER — SODIUM CHLORIDE 9 MG/ML
INJECTION, SOLUTION INTRAVENOUS CONTINUOUS
Status: DISCONTINUED | OUTPATIENT
Start: 2022-08-12 | End: 2022-08-12 | Stop reason: HOSPADM

## 2022-08-12 RX ADMIN — PHENYLEPHRINE HYDROCHLORIDE 200 MCG: 10 INJECTION INTRAVENOUS at 15:54

## 2022-08-12 RX ADMIN — HYDROMORPHONE HYDROCHLORIDE 0.2 MG: 2 INJECTION INTRAMUSCULAR; INTRAVENOUS; SUBCUTANEOUS at 15:58

## 2022-08-12 RX ADMIN — AMLODIPINE BESYLATE 2.5 MG: 2.5 TABLET ORAL at 21:14

## 2022-08-12 RX ADMIN — SODIUM CHLORIDE: 9 INJECTION, SOLUTION INTRAVENOUS at 21:33

## 2022-08-12 RX ADMIN — CEFAZOLIN 2000 MG: 10 INJECTION, POWDER, FOR SOLUTION INTRAVENOUS at 21:20

## 2022-08-12 RX ADMIN — PHENYLEPHRINE HYDROCHLORIDE 100 MCG: 10 INJECTION INTRAVENOUS at 15:08

## 2022-08-12 RX ADMIN — PHENYLEPHRINE HYDROCHLORIDE 200 MCG: 10 INJECTION INTRAVENOUS at 15:43

## 2022-08-12 RX ADMIN — HYDROMORPHONE HYDROCHLORIDE 0.4 MG: 2 INJECTION INTRAMUSCULAR; INTRAVENOUS; SUBCUTANEOUS at 16:38

## 2022-08-12 RX ADMIN — SODIUM CHLORIDE: 9 INJECTION, SOLUTION INTRAVENOUS at 13:15

## 2022-08-12 RX ADMIN — CEFAZOLIN 2000 MG: 10 INJECTION, POWDER, FOR SOLUTION INTRAVENOUS at 13:33

## 2022-08-12 RX ADMIN — FENTANYL CITRATE 50 MCG: 50 INJECTION, SOLUTION INTRAMUSCULAR; INTRAVENOUS at 14:38

## 2022-08-12 RX ADMIN — FENTANYL CITRATE 50 MCG: 50 INJECTION, SOLUTION INTRAMUSCULAR; INTRAVENOUS at 17:12

## 2022-08-12 RX ADMIN — SODIUM CHLORIDE: 9 INJECTION, SOLUTION INTRAVENOUS at 11:56

## 2022-08-12 RX ADMIN — HYDROMORPHONE HYDROCHLORIDE 0.4 MG: 2 INJECTION INTRAMUSCULAR; INTRAVENOUS; SUBCUTANEOUS at 16:43

## 2022-08-12 RX ADMIN — PHENYLEPHRINE HYDROCHLORIDE 200 MCG: 10 INJECTION INTRAVENOUS at 16:07

## 2022-08-12 RX ADMIN — FENTANYL CITRATE 50 MCG: 50 INJECTION, SOLUTION INTRAMUSCULAR; INTRAVENOUS at 13:53

## 2022-08-12 RX ADMIN — PROPOFOL 30 MCG/KG/MIN: 10 INJECTION, EMULSION INTRAVENOUS at 14:09

## 2022-08-12 RX ADMIN — FENTANYL CITRATE 50 MCG: 50 INJECTION, SOLUTION INTRAMUSCULAR; INTRAVENOUS at 17:06

## 2022-08-12 RX ADMIN — MORPHINE SULFATE 2 MG: 2 INJECTION, SOLUTION INTRAMUSCULAR; INTRAVENOUS at 19:50

## 2022-08-12 RX ADMIN — CYCLOBENZAPRINE 10 MG: 10 TABLET, FILM COATED ORAL at 21:14

## 2022-08-12 RX ADMIN — FENTANYL CITRATE 100 MCG: 50 INJECTION, SOLUTION INTRAMUSCULAR; INTRAVENOUS at 13:18

## 2022-08-12 RX ADMIN — FENTANYL CITRATE 50 MCG: 50 INJECTION, SOLUTION INTRAMUSCULAR; INTRAVENOUS at 17:18

## 2022-08-12 RX ADMIN — HYDROMORPHONE HYDROCHLORIDE 0.4 MG: 2 INJECTION INTRAMUSCULAR; INTRAVENOUS; SUBCUTANEOUS at 16:33

## 2022-08-12 RX ADMIN — FENTANYL CITRATE 50 MCG: 50 INJECTION, SOLUTION INTRAMUSCULAR; INTRAVENOUS at 17:24

## 2022-08-12 RX ADMIN — PHENYLEPHRINE HYDROCHLORIDE 100 MCG: 10 INJECTION INTRAVENOUS at 15:34

## 2022-08-12 RX ADMIN — PROPOFOL 130 MG: 10 INJECTION, EMULSION INTRAVENOUS at 13:20

## 2022-08-12 RX ADMIN — SODIUM CHLORIDE: 9 INJECTION, SOLUTION INTRAVENOUS at 12:52

## 2022-08-12 RX ADMIN — SUGAMMADEX 200 MG: 100 INJECTION, SOLUTION INTRAVENOUS at 16:16

## 2022-08-12 RX ADMIN — HYDROMORPHONE HYDROCHLORIDE 0.4 MG: 2 INJECTION INTRAMUSCULAR; INTRAVENOUS; SUBCUTANEOUS at 16:48

## 2022-08-12 RX ADMIN — ONDANSETRON 4 MG: 2 INJECTION INTRAMUSCULAR; INTRAVENOUS at 14:10

## 2022-08-12 RX ADMIN — MORPHINE SULFATE 4 MG: 4 INJECTION, SOLUTION INTRAMUSCULAR; INTRAVENOUS at 23:34

## 2022-08-12 RX ADMIN — DEXAMETHASONE SODIUM PHOSPHATE 10 MG: 4 INJECTION, SOLUTION INTRAMUSCULAR; INTRAVENOUS at 13:33

## 2022-08-12 RX ADMIN — OXYCODONE 10 MG: 5 TABLET ORAL at 21:14

## 2022-08-12 RX ADMIN — LIDOCAINE HYDROCHLORIDE 80 MG: 20 INJECTION, SOLUTION INFILTRATION; PERINEURAL at 13:20

## 2022-08-12 RX ADMIN — HYDROMORPHONE HYDROCHLORIDE 0.2 MG: 2 INJECTION INTRAMUSCULAR; INTRAVENOUS; SUBCUTANEOUS at 15:41

## 2022-08-12 RX ADMIN — ROCURONIUM BROMIDE 40 MG: 10 INJECTION INTRAVENOUS at 13:20

## 2022-08-12 ASSESSMENT — PAIN DESCRIPTION - ORIENTATION
ORIENTATION: MID

## 2022-08-12 ASSESSMENT — PAIN DESCRIPTION - DESCRIPTORS
DESCRIPTORS: DISCOMFORT
DESCRIPTORS: ACHING
DESCRIPTORS: DISCOMFORT
DESCRIPTORS: TIGHTNESS;TINGLING

## 2022-08-12 ASSESSMENT — PAIN DESCRIPTION - LOCATION
LOCATION: BACK

## 2022-08-12 ASSESSMENT — PAIN - FUNCTIONAL ASSESSMENT
PAIN_FUNCTIONAL_ASSESSMENT: PREVENTS OR INTERFERES SOME ACTIVE ACTIVITIES AND ADLS
PAIN_FUNCTIONAL_ASSESSMENT: 0-10
PAIN_FUNCTIONAL_ASSESSMENT: PREVENTS OR INTERFERES SOME ACTIVE ACTIVITIES AND ADLS
PAIN_FUNCTIONAL_ASSESSMENT: PREVENTS OR INTERFERES SOME ACTIVE ACTIVITIES AND ADLS

## 2022-08-12 ASSESSMENT — PAIN SCALES - GENERAL
PAINLEVEL_OUTOF10: 10
PAINLEVEL_OUTOF10: 8
PAINLEVEL_OUTOF10: 8
PAINLEVEL_OUTOF10: 9
PAINLEVEL_OUTOF10: 10
PAINLEVEL_OUTOF10: 7
PAINLEVEL_OUTOF10: 10

## 2022-08-12 ASSESSMENT — PAIN DESCRIPTION - FREQUENCY
FREQUENCY: INTERMITTENT
FREQUENCY: INTERMITTENT

## 2022-08-12 ASSESSMENT — PAIN DESCRIPTION - PAIN TYPE
TYPE: SURGICAL PAIN;CHRONIC PAIN
TYPE: SURGICAL PAIN;CHRONIC PAIN
TYPE: CHRONIC PAIN

## 2022-08-12 ASSESSMENT — PAIN DESCRIPTION - ONSET
ONSET: ON-GOING
ONSET: ON-GOING

## 2022-08-12 NOTE — ANESTHESIA POSTPROCEDURE EVALUATION
Department of Anesthesiology  Postprocedure Note    Patient: Mellisa Seals  MRN: [id number]  YOB: 1950  Date of evaluation: 8/12/2022      Procedure Summary     Date: 08/12/22 Room / Location: [address] DOUGIE Marte    Anesthesia Start: 0835 Anesthesia Stop: 0703    Procedure: L1-5 Decompression L1-5 Posterior Fusion (Spine Lumbar) Diagnosis:       Spinal stenosis of lumbar region, unspecified whether neurogenic claudication present      (Spinal stenosis of lumbar region, unspecified whether neurogenic claudication present Malathi Bhatia)    Surgeons: Isabel Alvarez MD Responsible Provider: Wyatt Maher MD    Anesthesia Type: general ASA Status: 3          Anesthesia Type: No value filed.     Dionna Phase I: Dionna Score: 10    Dionna Phase II:        Anesthesia Post Evaluation    Patient location during evaluation: PACU  Patient participation: complete - patient participated  Level of consciousness: awake  Airway patency: patent  Nausea & Vomiting: no vomiting and no nausea  Complications: no  Cardiovascular status: hemodynamically stable  Respiratory status: acceptable  Hydration status: stable

## 2022-08-12 NOTE — OP NOTE
Operative Note      Patient Name: Lexie Lozano  YOB: 1950  MRN: [id number]                      Account #: [de-identified]                               Room #: 011  Admission Date: 8/12/2022    Provider:  Yoanna Cartwright M.D.     DATE OF PROCEDURE: 8/12/2022     PREOPERATIVE DIAGNOSES:  1. L1-5 degenerative disc disease. 2.  L1-5 lumbar stenosis with neurogenic claudication  3. L1-5 facet arthropathy  4. L1-5 herniated nucleus pulposus     POSTOPERATIVE DIAGNOSES:  1. L1-5 degenerative disc disease. 2.  L1-5 lumbar stenosis with neurogenic claudication  3. L1-5 facet arthropathy  4. L1-5 herniated nucleus pulposus     OPERATION PERFORMED:  1. L1-5 bilateral laminectomy, partial medial facetectomies and foraminotomies of L1, L2, L3, L4 and L5 nerve roots   2. L1-5 posterior spinal fusion. 3.  L1-5 posterior spinal instrumentation, Streamline, SurgAlign instrumentation. 4.  Use of local autograft bone and crushed cancellous chips     SURGEON: Kapil Montanez MD    ASSISTANT:  Dieudonne Pinedo and Ethan Bernard PA-C  They assisted throughout the procedure with positioning, draping, retraction, wound closure, and dressing application. ANESTHESIA:  General.     INDICATIONS:  This is a 67 y.o. female with refractory back and left leg pain from degenerative disc disease and lumbar stenosis from L1-5. Patient has failed full conservative therapy including medication management, physical therapy, and epidural steroid injections. Due to the persistence of symptoms and reduction in the ADLs, patient elected surgical treatment. Patient, therefore, understood indications for the surgery as well as its risks, benefits, and alternatives. These risks include but are not limited to paralysis, infection, hematoma, dural tear, nerve root injury, nonunion, DVT/PE, stroke, MI, death etc.  All questions were answered. Informed consent was obtained.      OPERATIVE PROCEDURE:  The patient was taken to the operating room by the Anesthesiology Service and had satisfactory general anesthesia. A first-generation cephalosporin was given within 1 hour of surgical incision. 2 gm of cefazolin was given IV. Venous thromboembolic prophylaxis was performed with sequential devices. Melchor was placed using standard sterile technique. The patient was then positioned prone on a standard OSI frame with the abdomen hanging free and all bony prominences well padded. The low back was then prepped and draped in its entirety in the usual sterile fashion. Before incision, a formal timeout was taken per protocol. We next took a midline longitudinal approach and performed subperiosteal dissection out to the tips of the transverse processes of L1, L2, L3, L4 and L5. Intraoperative radiographic localization of level was confirmed. We then began the laminectomy as well as decompression by removing the spinous process of L1, L2, L3, L4 and L5. Microcurettes were used to remove scar tissue adhered to the dura from her prior operation. We entered the spinal canal, resecting the ligamentum flavum in its entirety over this region. Patient had significant stenosis in the lateral recess and neural foramina. Partial medial facetectomy was then performed with an osteotome to get lateral to the facet overhang, but just medial to the pedicles and nerve roots. Kerrison's were then used to perform foraminotomies of the L1, L2, L3, L4 and L5 nerve roots bilaterally. In this way, we completed decompression at L1-2, L2-3, L3-4, L4-5 with foraminotomies of the L1, L2, L3, L4 and L5 nerve roots bilaterally. Satisfied with this, we then turned our attention to perform spinal instrumentation and posterolateral fusion. Using anatomic landmarks and guided by direct visualization of the pedicles from within the canal, pedicle screws were placed bilaterally at L1, L2, L3, L4 and L5.   All the screws were completely interosseous as determined by bony palpation. Before the screws were inserted, the transverse processes were decorticated with a high-speed bur at L1, L2, L3, L4 and L5. Local autograft bone combined with crushed cancellous chips was placed over the decorticated elements bilaterally from L1-5. The screws were then inserted which were under tapped by 1 mm. Two rods were placed from L1-5, set screws engaged and tightened down to their final torque. One CrossLink was used. Everything was tightened down. A very rigid construct was achieved. Final x-ray was taken, demonstrated good position of the spine and all of the implants. Satisfied with this, we then achieved hemostasis. We then copiously irrigated the wound. We then inserted 2 Hemovac drains through a separate stab incision. The wound was then closed in layer with interrupted 1 and 2-0 Vicryl sutures and dusted with vancomycin powder. A 3-0 Monocryl was used for the skin. The skin edges were sealed with Dermabond. A dry sterile dressing was applied. The patient was then returned to the hospital bed, extubated, and taken to the recovery room in stable condition. Spinal cord monitoring remained stable throughout the operation. Implants:  Implant Name Type Inv.  Item Serial No.  Lot No. LRB No. Used Action   SYSTEM SPNL SEAL 3ML EXACT DURASEAL - [id number]  SYSTEM SPNL SEAL 3ML EXACT DURASEAL  [employer]- [id number] N/A 1 Implanted   CHIPS BNE GRFT 30CC 1-4MM [facility] FR DRY PRESERVATION - [id number]  CHIPS BNE GRFT 30CC 1-4MM [facility] [id number] [employer] SPINALGRA[employer] TE[employer]-  N/A 1 Implanted   SET SCR SPNL TI STREAMLINE - [id number]  SET SCR SPNL TI STREAMLINE  [employer]  N/A 10 Implanted   SCREW SPNL L40MM DIA6.5MM THORLUM TI ALLY POLYAX STREAMLINE - [id number]  SCREW SPNL L40MM DIA6.5MM THORLUM TI ALLY POLYAX STREAMLINE  [employer]  N/A 2 Implanted   SCREW SPNL L45MM DIA6.5MM 60DEG THORLUM PEDCL TI ALLY - [id number]  SCREW SPNL L45MM DIA6.5MM 60DEG THORLUM PEDCL TI ALLY  [employer]  N/A 8 Implanted   JACKY SPNL L120MM [id number] THORLUM PREBENT STREAMLINE - [id number]  JACKY SPNL L120MM [id number] THORLUM PREBENT STREAMLINE  [employer]  N/A 1 Implanted   JACKY SPNL L110MM [id number] THORLUM PREBENT STREAMLINE - [id number]  JACKY SPNL L110MM [id number] THORLUM PREBENT STREAMLINE  [employer]  N/A 1 Implanted   CONNECTOR SPNL M STREAMLINE TL WEI X LINK - [id number]  CONNECTOR SPNL M STREAMLINE TL WEI X LINK  [employer]  N/A 1 Implanted         Drains:   Closed/Suction Drain Inferior;Right Back Accordion (Active)       Closed/Suction Drain Inferior; Left Back Accordion (Active)       Urinary Catheter Melchor (Active)       COMPLICATIONS:  Durotomy     SPECIMENS:  None. ESTIMATED BLOOD LOSS:  300 mL. POSTOPERATIVE CARE:  The patient will be recovered in PACU and then a regular nursing floor. Once the drainage is low and pain is under control, patient will be discharged home per clinical indication. Patient will follow up in the office in 6 weeks. At that time, AP and lateral x-rays of the lumbar spine will be obtained to assess instrumentation and fusion.     Electronically signed by Jana Escoto MD on 8/12/2022 at 4:36 PM

## 2022-08-12 NOTE — ANESTHESIA PRE PROCEDURE
day Uzeil Kelly        sodium chloride flush 0.9 % injection 5-40 mL  5-40 mL IntraVENous PRN SAVANNA Sanabria        0.9 % sodium chloride infusion   IntraVENous PRN SAVANNA Kelly        ceFAZolin (ANCEF) 2000 mg in dextrose 5 % 50 mL IVPB  2,000 mg IntraVENous On Call to 95 SAVANNA Wick         Facility-Administered Medications Ordered in Other Encounters   Medication Dose Route Frequency Provider Last Rate Last Admin    fentaNYL (SUBLIMAZE) injection   IntraVENous PRN Kalpana Walkerre, APRN - CRNA   100 mcg at 08/12/22 1318    0.9 % sodium chloride infusion   IntraVENous Continuous PRN Acuna Catrachita, APRN - CRNA   New Bag at 08/12/22 1315    propofol injection   IntraVENous PRN Acuna Catrachita, APRN - CRNA   130 mg at 08/12/22 1320    lidocaine 2 % injection   IntraVENous PRN Acuna Catrachita, APRN - CRNA   80 mg at 08/12/22 1320    rocuronium (ZEMURON) injection   IntraVENous PRN Acuna Catrachita, APRN - CRNA   40 mg at 08/12/22 1320    ceFAZolin (ANCEF) 2000 mg in dextrose 5 % 50 mL IVPB   IntraVENous PRN Acuna Catrachita, APRN - CRNA   2,000 mg at 08/12/22 1333       Allergies:     Allergies   Allergen Reactions    Codeine Nausea And Vomiting       Problem List:    Patient Active Problem List   Diagnosis Code    COPD (chronic obstructive pulmonary disease) ([address] J44.9    Osteoarthritis of multiple joints M15.9    Hypertension I10    Lumbar stenosis with neurogenic claudication M48.062       Past Medical History:        Diagnosis Date    Anxiety     Arthritis     Cancer ([address] 1978    CERVICAL CANCER    COPD (chronic obstructive pulmonary disease) ([address] 05/24/2021    End[facility] ([facility]     cervical CA at age 32    Female bladder prolapse     with rectocele    Hypertension        Past Surgical History:        Procedure Laterality Date    BACK SURGERY  1976    COLONOSCOPY      HYSTERECTOMY (CERVIX STATUS UNKNOWN)  1978    JOINT REPLACEMENT Left 05/18/2017    Lt total Dr Sushma fernandez Right 2018       Social History:    Social History     Tobacco Use    Smoking status: Former     Years: 44.00     Types: Cigarettes     Quit date:      Years since quittin.6    Smokeless tobacco: Never   Substance Use Topics    Alcohol use: Yes     Alcohol/week: 4.0 standard drinks     Types: 2 Glasses of wine, 2 Shots of liquor per week     Comment: A WEEK, GLASS OF WINE on saturday                                Counseling given: Not Answered      Vital Signs (Current):   Vitals:    22 1106 22 1111   BP: (!) 149/81    Pulse: (!) 111    Resp: 20    Temp: 97.4 °F (36.3 °C)    TempSrc: Temporal    SpO2: 95%    Weight:  162 lb 9.6 oz (73.8 kg)   Height:  5' 2\" (1.575 m)                                              BP Readings from Last 3 Encounters:   22 (!) 149/81   22 (!) 159/78   22 124/72       NPO Status: Time of last liquid consumption:                         Time of last solid consumption:                         Date of last liquid consumption: 22                        Date of last solid food consumption: 22    BMI:   Wt Readings from Last 3 Encounters:   22 162 lb 9.6 oz (73.8 kg)   22 168 lb 6.9 oz (76.4 kg)   22 169 lb (76.7 kg)     Body mass index is 29.74 kg/m².     CBC:   Lab Results   Component Value Date/Time    WBC 5.9 2022 01:04 PM    RBC 4.95 2022 01:04 PM    RBC 4.49 07/15/2011 03:09 PM    HGB 15.3 2022 01:04 PM    HCT 44.6 2022 01:04 PM    MCV 90.1 2022 01:04 PM    RDW 14.3 08/10/2017 11:55 AM     2022 01:04 PM       CMP:   Lab Results   Component Value Date/Time     2022 10:34 AM    K 4.6 2022 10:34 AM     2022 10:34 AM    CO2 25 2022 10:34 AM    BUN 10 2022 10:34 AM    CREATININE 0.6 2022 10:34 AM    LABGLOM >90 2022 10:34 AM    GLUCOSE 92 2022 10:34 AM    GLUCOSE 92 07/15/2011 03:09 PM    PROT 7.0 09/02/2021 11:10 AM    CALCIUM 10.0 07/21/2022 10:34 AM    BILITOT 0.5 09/02/2021 11:10 AM    ALKPHOS 79 09/02/2021 11:10 AM    AST 23 09/02/2021 11:10 AM    ALT 19 09/02/2021 11:10 AM       POC Tests: No results for input(s): POCGLU, POCNA, POCK, POCCL, POCBUN, POCHEMO, POCHCT in the last 72 hours. Coags:   Lab Results   Component Value Date/Time    INR 0.84 07/21/2022 10:34 AM    APTT 29.8 07/21/2022 10:34 AM       HCG (If Applicable): No results found for: PREGTESTUR, PREGSERUM, HCG, HCGQUANT     ABGs: No results found for: PHART, PO2ART, FKT7DAE, DLA6GHD, BEART, P8NDWJFM     Type & Screen (If Applicable):  Lab Results   Component Value Date    LABRH POS 08/12/2022       Drug/Infectious Status (If Applicable):  Lab Results   Component Value Date/Time    HEPCAB Negative 08/10/2017 11:55 AM       COVID-19 Screening (If Applicable): No results found for: COVID19        Anesthesia Evaluation  Patient summary reviewed and Nursing notes reviewed no history of anesthetic complications:   Airway: Mallampati: II  TM distance: >3 FB   Neck ROM: limited  Mouth opening: > = 3 FB   Dental:          Pulmonary:normal exam  breath sounds clear to auscultation  (+) COPD:                             Cardiovascular:  Exercise tolerance: good (>4 METS),   (+) hypertension:,       ECG reviewed                        Neuro/Psych:   Negative Neuro/Psych ROS              GI/Hepatic/Renal: Neg GI/Hepatic/Renal ROS            Endo/Other:    (+) : arthritis:., malignancy/cancer. Abdominal:   (+) obese,     Abdomen: soft. Vascular: negative vascular ROS. Other Findings:           Anesthesia Plan      general     ASA 3       Induction: intravenous. MIPS: Postoperative opioids intended and Prophylactic antiemetics administered. Anesthetic plan and risks discussed with patient and child/children. Plan discussed with CRNA.                     Zeyad Mart [facility]    8/12/2022

## 2022-08-12 NOTE — PROGRESS NOTES
1634- pt to pacu, resp easy, unlabored. Vss, pt reports pain, medicated per anesthesia. Pt appears in no acute distress. 1705- pt reports pain, medicated per orders. 1723- pt reports slight improvement in pain level, continue to medicate per orders.      [id number]- report called to [address] on [address]- pt meets criteria for discharge from pacu    1804- transport here to take pt to [facility]

## 2022-08-12 NOTE — BRIEF OP NOTE
Brief Postoperative Note      Patient: Venessa Cooper  YOB: 1950  MRN: [id number]    Date of Procedure: 8/12/2022    Pre-Op Diagnosis:    L1-S1 degenerative disc disease, facet arthropathy, disc protrusions with stenosis and neurogenic claudication. Post-Op Diagnosis: Same       Procedure(s):  L1-5 Decompression L1-5 Posterior Fusion    Surgeon(s):  Nell Marin MD    Assistant:  Physician Assistant: Orly Emery PA-C    Anesthesia: General    Estimated Blood Loss (mL): 323 mL    Complications: Durotomy    Specimens:   * No specimens in log *    Implants:  Implant Name Type Inv.  Item Serial No.  Lot No. LRB No. Used Action   SYSTEM SPNL SEAL 3ML EXACT DURASEAL - [id number]  SYSTEM SPNL SEAL 3ML EXACT DURASEAL  [employer]- [id number] N/A 1 Implanted   CHIPS BNE GRFT 30CC 1-4MM ALLGRFT CANC FRZ DRY PRESERVATION - [id number]  CHIPS BNE GRFT 30CC 1-4MM ALLGRFT CANC FRZ DRY PRESERVATION [id number] [employer] SPINALGRA[employer] TECH-  N/A 1 Implanted   SET SCR SPNL TI STREAMLINE - [id number]  SET SCR SPNL TI STREAMLINE  [employer]  N/A 10 Implanted   SCREW SPNL L40MM DIA6.5MM THORLUM TI ALLY POLYAX STREAMLINE - [id number]  SCREW SPNL L40MM DIA6.5MM THORLUM TI ALLY POLYAX STREAMLINE  [employer]  N/A 2 Implanted   SCREW SPNL L45MM DIA6.5MM 60DEG THORLUM PEDCL TI ALLY - [id number]  SCREW SPNL L45MM DIA6.5MM 60DEG THORLUM PEDCL TI ALLY  [employer]  N/A 8 Implanted   JACKY SPNL L120MM [id number] THORLUM PREBENT STREAMLINE - [id number]  JACKY SPNL L120MM [id number] THORLUM PREBENT STREAMLINE  [employer]  N/A 1 Implanted   JACKY SPNL L110MM [id number] THORLUM PREBENT STREAMLINE - [id number]  JACKY SPNL L110MM [id number] THORLUM PREBENT STREAMLINE  [employer]  N/A 1 Implanted   CONNECTOR SPNL M STREAMLINE TL WEI X LINK - [id number]  CONNECTOR SPNL M STREAMLINE TL WEI X LINK [id number]  N/A 1 Implanted         Drains:   Closed/Suction Drain Inferior;Right Back Accordion (Active)       Closed/Suction Drain Inferior; Left Back Accordion (Active)       Urinary Catheter Melchor (Active)       Findings: Stenosis    Electronically signed by SAVANNA Mcmanus on 8/12/2022 at 4:33 PM

## 2022-08-12 NOTE — PROGRESS NOTES
Pt admitted to [facility] room 14 and oriented to unit. SCD sleeves applied. Nares swabbed. Pt verbalized permission for first name, last initial and physicians name on white board. SDS board and discharge criteria explained, pt and family verbalized understanding. Pt denies thoughts of harming self or others. Call light in reach. Family at the bedside.

## 2022-08-13 LAB
ANION GAP SERPL CALCULATED.3IONS-SCNC: 13 MEQ/L (ref 8–16)
BUN BLDV-MCNC: 8 MG/DL (ref 7–22)
CALCIUM SERPL-MCNC: 8.2 MG/DL (ref 8.5–10.5)
CHLORIDE BLD-SCNC: 107 MEQ/L (ref 98–111)
CO2: 20 MEQ/L (ref 23–33)
CREAT SERPL-MCNC: 0.4 MG/DL (ref 0.4–1.2)
EKG ATRIAL RATE: 112 BPM
EKG P AXIS: 66 DEGREES
EKG P-R INTERVAL: 158 MS
EKG Q-T INTERVAL: 322 MS
EKG QRS DURATION: 74 MS
EKG QTC CALCULATION (BAZETT): 439 MS
EKG R AXIS: 49 DEGREES
EKG T AXIS: 59 DEGREES
EKG VENTRICULAR RATE: 112 BPM
GFR SERPL CREATININE-BSD FRML MDRD: > 90 ML/MIN/1.73M2
GLUCOSE BLD-MCNC: 112 MG/DL (ref 70–108)
HCT VFR BLD CALC: 37.3 % (ref 37–47)
HEMOGLOBIN: 12.5 GM/DL (ref 12–16)
POTASSIUM SERPL-SCNC: 3.7 MEQ/L (ref 3.5–5.2)
SODIUM BLD-SCNC: 140 MEQ/L (ref 135–145)

## 2022-08-13 PROCEDURE — 93010 ELECTROCARDIOGRAM REPORT: CPT | Performed by: INTERNAL MEDICINE

## 2022-08-13 PROCEDURE — 97161 PT EVAL LOW COMPLEX 20 MIN: CPT

## 2022-08-13 PROCEDURE — 97530 THERAPEUTIC ACTIVITIES: CPT

## 2022-08-13 PROCEDURE — 97535 SELF CARE MNGMENT TRAINING: CPT

## 2022-08-13 PROCEDURE — 93005 ELECTROCARDIOGRAM TRACING: CPT | Performed by: INTERNAL MEDICINE

## 2022-08-13 PROCEDURE — 85018 HEMOGLOBIN: CPT

## 2022-08-13 PROCEDURE — 2580000003 HC RX 258: Performed by: PHYSICIAN ASSISTANT

## 2022-08-13 PROCEDURE — 6370000000 HC RX 637 (ALT 250 FOR IP): Performed by: PHYSICIAN ASSISTANT

## 2022-08-13 PROCEDURE — 97116 GAIT TRAINING THERAPY: CPT

## 2022-08-13 PROCEDURE — 85014 HEMATOCRIT: CPT

## 2022-08-13 PROCEDURE — 6360000002 HC RX W HCPCS: Performed by: PHYSICIAN ASSISTANT

## 2022-08-13 PROCEDURE — 36415 COLL VENOUS BLD VENIPUNCTURE: CPT

## 2022-08-13 PROCEDURE — 97166 OT EVAL MOD COMPLEX 45 MIN: CPT

## 2022-08-13 PROCEDURE — 1200000000 HC SEMI PRIVATE

## 2022-08-13 PROCEDURE — 80048 BASIC METABOLIC PNL TOTAL CA: CPT

## 2022-08-13 RX ORDER — OXYCODONE HYDROCHLORIDE 5 MG/1
5 TABLET ORAL EVERY 4 HOURS PRN
Status: DISCONTINUED | OUTPATIENT
Start: 2022-08-13 | End: 2022-08-19 | Stop reason: HOSPADM

## 2022-08-13 RX ORDER — OXYCODONE HYDROCHLORIDE 5 MG/1
10 TABLET ORAL EVERY 4 HOURS PRN
Status: DISCONTINUED | OUTPATIENT
Start: 2022-08-13 | End: 2022-08-19 | Stop reason: HOSPADM

## 2022-08-13 RX ADMIN — CYCLOBENZAPRINE 10 MG: 10 TABLET, FILM COATED ORAL at 10:55

## 2022-08-13 RX ADMIN — POLYETHYLENE GLYCOL 3350 17 G: 17 POWDER, FOR SOLUTION ORAL at 08:16

## 2022-08-13 RX ADMIN — MORPHINE SULFATE 4 MG: 4 INJECTION, SOLUTION INTRAMUSCULAR; INTRAVENOUS at 13:06

## 2022-08-13 RX ADMIN — MORPHINE SULFATE 4 MG: 4 INJECTION, SOLUTION INTRAMUSCULAR; INTRAVENOUS at 01:35

## 2022-08-13 RX ADMIN — CEFAZOLIN 2000 MG: 10 INJECTION, POWDER, FOR SOLUTION INTRAVENOUS at 05:03

## 2022-08-13 RX ADMIN — SENNOSIDES AND DOCUSATE SODIUM 1 TABLET: 50; 8.6 TABLET ORAL at 20:34

## 2022-08-13 RX ADMIN — OXYCODONE 10 MG: 5 TABLET ORAL at 10:56

## 2022-08-13 RX ADMIN — ALPRAZOLAM 0.25 MG: 0.25 TABLET ORAL at 02:55

## 2022-08-13 RX ADMIN — ALPRAZOLAM 0.25 MG: 0.25 TABLET ORAL at 13:00

## 2022-08-13 RX ADMIN — SODIUM CHLORIDE: 9 INJECTION, SOLUTION INTRAVENOUS at 13:06

## 2022-08-13 RX ADMIN — OXYCODONE 10 MG: 5 TABLET ORAL at 15:19

## 2022-08-13 RX ADMIN — MORPHINE SULFATE 4 MG: 4 INJECTION, SOLUTION INTRAMUSCULAR; INTRAVENOUS at 08:17

## 2022-08-13 RX ADMIN — SODIUM CHLORIDE, PRESERVATIVE FREE 10 ML: 5 INJECTION INTRAVENOUS at 08:20

## 2022-08-13 RX ADMIN — OXYCODONE 10 MG: 5 TABLET ORAL at 04:03

## 2022-08-13 RX ADMIN — SODIUM CHLORIDE, PRESERVATIVE FREE 10 ML: 5 INJECTION INTRAVENOUS at 20:34

## 2022-08-13 RX ADMIN — AMLODIPINE BESYLATE 2.5 MG: 2.5 TABLET ORAL at 08:15

## 2022-08-13 RX ADMIN — SENNOSIDES AND DOCUSATE SODIUM 1 TABLET: 50; 8.6 TABLET ORAL at 08:16

## 2022-08-13 RX ADMIN — FAMOTIDINE 20 MG: 20 TABLET ORAL at 08:16

## 2022-08-13 RX ADMIN — BISACODYL 5 MG: 5 TABLET, COATED ORAL at 08:15

## 2022-08-13 RX ADMIN — MORPHINE SULFATE 4 MG: 4 INJECTION, SOLUTION INTRAMUSCULAR; INTRAVENOUS at 05:21

## 2022-08-13 RX ADMIN — CYCLOBENZAPRINE 10 MG: 10 TABLET, FILM COATED ORAL at 20:34

## 2022-08-13 RX ADMIN — OXYCODONE 10 MG: 5 TABLET ORAL at 20:34

## 2022-08-13 ASSESSMENT — PAIN DESCRIPTION - LOCATION
LOCATION: BACK

## 2022-08-13 ASSESSMENT — PAIN DESCRIPTION - FREQUENCY: FREQUENCY: CONTINUOUS

## 2022-08-13 ASSESSMENT — PAIN DESCRIPTION - ORIENTATION
ORIENTATION: MID;LOWER
ORIENTATION: MID
ORIENTATION: MID;LOWER
ORIENTATION: MID;LOWER
ORIENTATION: MID
ORIENTATION: MID;LOWER

## 2022-08-13 ASSESSMENT — PAIN SCALES - GENERAL
PAINLEVEL_OUTOF10: 4
PAINLEVEL_OUTOF10: 10
PAINLEVEL_OUTOF10: 10
PAINLEVEL_OUTOF10: 7
PAINLEVEL_OUTOF10: 10
PAINLEVEL_OUTOF10: 9
PAINLEVEL_OUTOF10: 8
PAINLEVEL_OUTOF10: 10

## 2022-08-13 ASSESSMENT — PAIN DESCRIPTION - DESCRIPTORS
DESCRIPTORS: DISCOMFORT
DESCRIPTORS: DISCOMFORT
DESCRIPTORS: ACHING;DISCOMFORT
DESCRIPTORS: ACHING
DESCRIPTORS: ACHING;DISCOMFORT

## 2022-08-13 ASSESSMENT — PAIN DESCRIPTION - ONSET: ONSET: ON-GOING

## 2022-08-13 ASSESSMENT — PAIN - FUNCTIONAL ASSESSMENT
PAIN_FUNCTIONAL_ASSESSMENT: PREVENTS OR INTERFERES SOME ACTIVE ACTIVITIES AND ADLS

## 2022-08-13 ASSESSMENT — PAIN DESCRIPTION - PAIN TYPE: TYPE: ACUTE PAIN;CHRONIC PAIN;SURGICAL PAIN

## 2022-08-13 NOTE — CONSULTS
Hospital Medicine Consult    Patient:  Lexie Lozano  MRN: [id number]    Referring Physician: Dr. Garland Louise  Reason for Consult: Postop medical management  Primacy Care Physician: Patti Banerjee MD    HISTORY OF PRESENT ILLNESS:   The patient is a 67 y.o. female with chronic right radicular low back pain from L1-5 degenerative disc disease/HNP with lumbar spinal stenosis admitted for L1-5 bilateral laminectomy and L1-5 posterior spinal fusion. She had a durotomy with repair. She denies headache. Denies any leg or weakness, no chest pain, no shortness of breath, no nausea vomiting. Past Medical History:        Diagnosis Date    Anxiety     Arthritis     Cancer ([facility] [address] 1978    CERVICAL CANCER    COPD (chronic obstructive pulmonary disease) ([facility] [address] 05/24/2021    Endometrial cancer (HCC)     cervical CA at age 32    Female bladder prolapse     with rectocele    Hypertension        Past Surgical History:        Procedure Laterality Date    BACK SURGERY  1976    COLONOSCOPY      HYSTERECTOMY (CERVIX STATUS UNKNOWN)  1978    JOINT REPLACEMENT Left 05/18/2017    Lt total shoulder , Dr Johnson No Right 02/2018       Medications: Scheduled Meds:   famotidine  20 mg Oral Daily    sodium chloride flush  5-40 mL IntraVENous 2 times per day    polyethylene glycol  17 g Oral Daily    bisacodyl  5 mg Oral Daily    sennosides-docusate sodium  1 tablet Oral BID    amLODIPine  2.5 mg Oral Daily     Continuous Infusions:   sodium chloride      sodium chloride 125 mL/hr at 08/13/22 1306     PRN Meds:.diphenhydrAMINE, sodium chloride flush, sodium chloride, acetaminophen, ondansetron **OR** ondansetron, oxyCODONE **OR** oxyCODONE, morphine **OR** morphine, cyclobenzaprine, bisacodyl, fleet, ALPRAZolam    Allergies:  Codeine    Social History:   TOBACCO:   reports that she quit smoking about 14 years ago. Her smoking use included cigarettes.  She has never used smokeless tobacco.  ETOH:   reports current alcohol use of about 4.0 standard drinks per week. Family History:       Problem Relation Age of Onset    COPD Mother     Emphysema Mother     Other Father         diverticulitis    Breast Cancer Maternal Aunt 46    Breast Cancer Maternal Aunt 62      REVIEW OF SYSTEMS:    CONSTITUTIONAL:  negative for fevers, chills  RESPIRATORY:  negative for cough and shortness of breath  CARDIOVASCULAR:  negative for chest pain, palpitations  GASTROINTESTINAL:  negative for nausea, vomiting, incontinence  GENITOURINARY:  negative for frequency and urinary incontinence  MUSCULOSKELETAL: Positive for back pain, leg pain  NEUROLOGICAL:  negative for numbness/tingling, headaches  BEHAVIOR/PSYCH:  negative for depressed mood, increased anxiety       Physical Exam:    Vitals: /78   Pulse 100   Temp 98.4 °F (36.9 °C) (Oral)   Resp 16   Ht 5' 2\" (1.575 m)   Wt 162 lb 9.6 oz (73.8 kg)   SpO2 96%   BMI 29.74 kg/m²   General appearance: alert, appears stated age and cooperative  Skin: Skin color, texture, turgor normal. No rashes or lesions  HEENT: Head: atraumatic  Neck: no adenopathy, no carotid bruit, no JVD, and supple, symmetrical, trachea midline  Lungs: clear to auscultation bilaterally  Heart: S1, S2 normal  Abdomen: soft, non-tender; bowel sounds normal; no masses,  no organomegaly  Extremities: no edema, redness or tenderness in the calves or thighs  Neurologic: Mental status: Alert, oriented, thought content appropriate  Back drains in situ    CBC:   Recent Labs     08/13/22  0635   HGB 12.5     BMP:    Recent Labs     08/13/22  0635      K 3.7      CO2 20*   BUN 8   CREATININE 0.4   GLUCOSE 112*             Assessment and Plan    L1-S1 degenerative disc disease, facet arthropathy, disc protrusions with stenosis and neurogenic claudication. S/p L1-5 Decompression L1-5 PSF  HTN    Continue with amlodipine. Hold for systolic blood pressure less than 120  Bowel regimen  SCDs.   Thank you for the consult Dr. Yasmeen Moore.     Patient Active Problem List   Diagnosis Code    COPD (chronic obstructive pulmonary disease) ([facility] J44.9    Osteoarthritis of multiple joints M15.9    Hypertension I10    Lumbar stenosis with neurogenic claudication Jannie Wolfe MD, MD  Consulting Internist.  8/13/2022

## 2022-08-13 NOTE — PROGRESS NOTES
[address] [address]  INPATIENT PHYSICAL THERAPY  EVALUATION  [facility] ORTHOPEDICS 7K - [id number]    Time In: 4854  Time Out: 1503  Timed Code Treatment Minutes: 8 Minutes  Minutes: 17          Date: 2022  Patient Name: Jeannine Verduzco,  Gender:  female        MRN: [id number]  : 1950  (67 y.o.)      Referring Practitioner: SAVANNA Santacruz  Diagnosis: Spinal stenosis of lumbar region, unspecified whether neurogenic claudication present  Additional Pertinent Hx: The patient is a 80-year-old female with complaints of constant low back pain that radiates pain into the left groin and left anterior thigh and down the left lateral leg to the ankle. Symptoms have been present on a chronic basis and worsening. Current VAS score 8/10. Percentage wise, 50% pain in the back and 50% left leg. Activities that aggravate the pain include standing. Rest helps relieve the pain. Modifying factors include medication management, PT and MARIA DEL CARMEN with really no relief. No bowel or bladder changes. Former smoker. She had a prior back surgery in . L1-5 Decompression L1-5 Posterior Fusion    on  with dr sanchez     Restrictions/Precautions:  Restrictions/Precautions: Fall Risk, General Precautions  Required Braces or Orthoses  Spinal: Lumbar Corset  Position Activity Restriction  Spinal Precautions: No Bending, No Lifting, No Twisting  Other position/activity restrictions: Lumbar Corset when up    Subjective:  Chart Reviewed: Yes  Patient assessed for rehabilitation services?: Yes  Family / Caregiver Present: No  Subjective: RN approved session.  Pt pleasant and agreeable to therapy    General:  Overall Orientation Status: Within Functional Limits  Vision: Within Functional Limits  Hearing: Within functional limits       Pain: 8/10: back     Vitals: Vitals not assessed per clinical judgement, see nursing flowsheet    Social/Functional History:    Lives With: Alone  Type of Home: Trailer  Home Layout: One level  Home Access: Ramped entrance, Stairs to enter with rails  Entrance Stairs - Number of Steps: 4  Entrance Stairs - Rails: Both  Home Equipment: Walker, rolling, Sock aid, Grab bars, Long-handled shoehorn, Reacher     Bathroom Shower/Tub: Walk-in shower  Bathroom Toilet: Handicap height  Bathroom Equipment: Built-in shower seat, Grab bars in shower, Grab bars around toilet, 3-in-1 commode  Bathroom Accessibility: Accessible       ADL Assistance: [facility] [address] [facility] [address]: Independent  Homemaking Responsibilities: Yes  Ambulation Assistance: Independent  Transfer Assistance: Independent    Active : Yes          OBJECTIVE:  Range of Motion:  Bilateral Lower Extremity: WFL    Strength:  Bilateral Lower Extremity: Impaired - grossly deconditioned    Balance:  Static Sitting Balance:  Stand By Assistance  Static Standing Balance: Stand By Assistance    Bed Mobility:  Not Tested    Transfers:  Sit to Stand: Stand By Assistance, X 1, cues for hand placement  Stand to Sit:Stand By Assistance, X 1, cues for hand placement    Ambulation:  Stand By Assistance  Distance: 400'   Surface: Level Tile  Device:Rolling Walker  Gait Deviations:  Slow [place] and Decreased Gait Speed    Functional Outcome Measures: Completed  AM-PAC Inpatient Mobility Raw Score : 18  AM-PAC Inpatient T-Scale Score : 43.63    ASSESSMENT:  Activity Tolerance:  Patient tolerance of  treatment: good. Treatment Initiated: Treatment and education initiated within context of evaluation. Evaluation time included review of current medical information, gathering information related to past medical, social and functional history, completion of standardized testing, formal and informal observation of tasks, assessment of data and development of plan of care and goals.   Treatment time included skilled education and facilitation of tasks to increase safety and independence with functional mobility for improved independence and quality of life.    Assessment: Body Structures, Functions, Activity Limitations Requiring Skilled Therapeutic Intervention: Decreased functional mobility , Decreased endurance, Decreased balance, Decreased strength, Decreased posture  Assessment: Delilah Christianson is a 67 y.o. female that presents with back surgery. she is ind prior to admission now requiring assist for basic mobility. Pt demonstrates a decrease in baseline by way of bed mobility, transfers and ambulation secondary to decreased activity tolerance, strength, fatigue, and balance deficits. Pt will benefit from skilled PT services throughout admission and beyond hospital discharge for improvements in functional mobility and in order to decrease fall risk and return pt to OF. Therapy Prognosis: Good    Requires PT Follow-Up: Yes    Discharge Recommendations:  Discharge Recommendations: Home with assist PRN, Continue to assess pending progress    Patient Education:      .     Patient Education  Education Given To: Patient  Education Provided: Role of Therapy, Plan of Care, Precautions  Education Method: Verbal  Barriers to Learning: None  Education Outcome: Verbalized understanding       Equipment Recommendations:  Equipment Needed: No    Plan:  Current Treatment Recommendations: Strengthening, Balance training, Gait training, Stair training, Functional mobility training  Plan:  (6x O)    Goals:  Patient goals : none stated  Short Term Goals  Time Frame for Short term goals: by discharge  Short term goal 1: bed mobility with HOB flat, no rails, mod I for increased functional ind  Short term goal 2: sit<>stand from various surfaces with LRD mod I for safe transfers  Short term goal 3: ambulate 200' with LRD mod I for safe household distances  Short term goal 4: navigate 3 steps with LRD mod I for safe enter/exit of home  Long Term Goals  Time Frame for Long term goals : NA d/t short ELOS    Following session, patient left in safe position with all fall risk precautions in place.     Annice Single (Truex) PT, DPT

## 2022-08-13 NOTE — PROGRESS NOTES
Department of Orthopedic Surgery  Spine Service  Attending Progress Note        Subjective:  POD#1 patient doing okay, c/o back pain. Bedrest since surgery, denies headache. Melchor in place. No BM. Hgb 12.5    Vitals  VITALS:  BP (!) 141/81   Pulse (!) 110   Temp 98.8 °F (37.1 °C) (Oral)   Resp 18   Ht 5' 2\" (1.575 m)   Wt 162 lb 9.6 oz (73.8 kg)   SpO2 95%   BMI 29.74 kg/m²   24HR INTAKE/OUTPUT:    Intake/Output Summary (Last 24 hours) at 8/13/2022 1118  Last data filed at 8/13/2022 6659  Gross per 24 hour   Intake 2607.91 ml   Output 1830 ml   Net 777.91 ml     URINARY CATHETER OUTPUT (Melchor):  Urinary Catheter Melchor-Output (mL): 350 mL  DRAIN/TUBE OUTPUT:  Closed/Suction Drain Inferior;Right Back Accordion-Output (ml): 10 ml  Closed/Suction Drain Inferior; Left Back Accordion-Output (ml): 90 ml      PHYSICAL EXAM:    Orientation:  alert and oriented to person, place and time    Incision:  dressing in place, clean, dry, intact      Lower Extremity Motor :  quadriceps, extensor hallucis longus, dorsiflexion, plantarflexion 5/5 bilaterally  Lower Extremity Sensory:  Intact L1-S1    Flatus:  positive    LABS:    HgB:    Lab Results   Component Value Date/Time    HGB 12.5 08/13/2022 06:35 AM     Hemoglobin/Hematocrit:    Lab Results   Component Value Date/Time    HGB 12.5 08/13/2022 06:35 AM    HCT 37.3 08/13/2022 06:35 AM     BMP:    Lab Results   Component Value Date/Time     08/13/2022 06:35 AM    K 3.7 08/13/2022 06:35 AM     08/13/2022 06:35 AM    CO2 20 08/13/2022 06:35 AM    BUN 8 08/13/2022 06:35 AM    LABALBU 4.4 09/02/2021 11:10 AM    LABALBU 4.5 07/15/2011 03:09 PM    CREATININE 0.4 08/13/2022 06:35 AM    CALCIUM 8.2 08/13/2022 06:35 AM    LABGLOM >90 08/13/2022 06:35 AM    GLUCOSE 112 08/13/2022 06:35 AM    GLUCOSE 92 07/15/2011 03:09 PM       ASSESSMENT AND PLAN:    Post operative day 1 status post L1-5 decompression and fusion    1:  Monitor labs and drain output (half compression)  2: Activity Level:  As tolerated. PT/OT, back brace when ambulating.  If patient develops a headache, have her lay flat  3:  Pain Control:  Ok  4:  Discharge Planning:  Pending  5:  Continue cunningham until patient ambulatory    [facility]

## 2022-08-13 NOTE — PLAN OF CARE
Problem: Discharge Planning  Goal: Discharge to home or other facility with appropriate resources  Outcome: Progressing  Flowsheets  Taken 8/12/2022 2100 by Cristela Sullivan RN  Discharge to home or other facility with appropriate resources: Identify barriers to discharge with patient and caregiver  Taken 8/12/2022 1134 by Leta Raines RN  Discharge to home or other facility with appropriate resources: Identify discharge learning needs (meds, wound care, etc)     Problem: Pain  Goal: Verbalizes/displays adequate comfort level or baseline comfort level  Outcome: Progressing  Flowsheets (Taken 8/13/2022 0041)  Verbalizes/displays adequate comfort level or baseline comfort level:   Encourage patient to monitor pain and request assistance   Assess pain using appropriate pain scale   Administer analgesics based on type and severity of pain and evaluate response   Implement non-pharmacological measures as appropriate and evaluate response   Consider cultural and social influences on pain and pain management   Notify Licensed Independent Practitioner if interventions unsuccessful or patient reports new pain     Problem: ABCDS Injury Assessment  Goal: Absence of physical injury  Outcome: Progressing  Flowsheets (Taken 8/13/2022 0036)  Absence of Physical Injury: Implement safety measures based on patient assessment     Problem: Skin/Tissue Integrity  Goal: Absence of new skin breakdown  Description: 1. Monitor for areas of redness and/or skin breakdown  2. Assess vascular access sites hourly  3. Every 4-6 hours minimum:  Change oxygen saturation probe site  4. Every 4-6 hours:  If on nasal continuous positive airway pressure, respiratory therapy assess nares and determine need for appliance change or resting period. Outcome: Progressing  Note: No evidence of new skin breakdown. Pt. Turned and repositioned frequently.       Problem: Musculoskeletal - Adult  Goal: Return mobility to safest level of function  Outcome: Progressing  Flowsheets (Taken 8/13/2022 0041)  Return Mobility to Safest Level of Function:   Assess patient stability and activity tolerance for standing, transferring and ambulating with or without assistive devices   Assist with transfers and ambulation using safe patient handling equipment as needed   Ensure adequate protection for wounds/incisions during mobilization   Obtain physical therapy/occupational therapy consults as needed     Problem: Musculoskeletal - Adult  Goal: Maintain proper alignment of affected body part  Outcome: Progressing  Flowsheets (Taken 8/13/2022 0041)  Maintain proper alignment of affected body part: Support and protect limb and body alignment per provider's orders     Problem: Genitourinary - Adult  Goal: Urinary catheter remains patent  Outcome: Progressing  Flowsheets (Taken 8/13/2022 0041)  Urinary catheter remains patent: Assess patency of urinary catheter     Problem: Infection - Adult  Goal: Absence of infection during hospitalization  Outcome: Progressing  Flowsheets (Taken 8/13/2022 0041)  Absence of infection during hospitalization:   Assess and monitor for signs and symptoms of infection   Monitor lab/diagnostic results   Monitor all insertion sites i.e., indwelling lines, tubes and drains   Care plan reviewed with patient. Patient verbalize understanding of the plan of care and contribute to goal setting.

## 2022-08-13 NOTE — CARE COORDINATION
8/13/22, 11:32 AM EDT  DISCHARGE PLANNING EVALUATION:    Perry Chinchilla       Admitted: 8/12/2022/ [facility] day: 1   [facility]: [id number] Reason for admit: Spinal stenosis of lumbar region, unspecified whether neurogenic claudication present [M48.061]  Lumbar stenosis with neurogenic claudication [M48.062]   PMH:  has a past medical history of Anxiety, Arthritis, Cancer ([facility] [address] COPD (chronic obstructive pulmonary disease) ([facility] [address] Endometrial cancer ([facility] [address] Female bladder prolapse, and Hypertension. Procedure:   8/12  L1-S1 degenerative disc disease, facet arthropathy, disc protrusions with stenosis and neurogenic claudication. Barriers to Discharge:  POD 1. Drain care-half compression. Pain control. Activity as tolerated. Back brace. Melchor until ambulating. PCP: Miguel Tenorio MD  Readmission Risk Score: 12.3%  Patient's Healthcare Decision Maker: Legal Next of Kin    Patient Goals/Plan/Treatment Preferences: Spoke with Tish Newton, she plans to return home alone. She has a walker. If she is discharged home with drains she agrees to [facility]. List of [facility] agencies offered, prefer [facility]. Spoke with Ortho, no plans for discharge until Monday. Transportation/Food Security/Housekeeping Addressed:  No issues identified.

## 2022-08-13 NOTE — PROGRESS NOTES
Spine team contacted regarding bedrest order. SAVANNA Juarez instructed this nurse to keep patient on bedrest with HOB flat until Lexi CORRALES rounds this morning.

## 2022-08-13 NOTE — PROGRESS NOTES
Marcksuzytres Williamson 60  INPATIENT OCCUPATIONAL THERAPY  [facility] ORTHOPEDICS 7K  EVALUATION    Time:   Time In: 1400  Time Out: 1434  Timed Code Treatment Minutes: 24 Minutes  Minutes: 34          Date: 2022  Patient Name: Venessa Cooper,   Gender: female      MRN: [id number]  : 1950  (67 y.o.)  Referring Practitioner: Wendell Hammans, PA  Diagnosis: Lumbar Stenosis with neurogenic claudication  Additional Pertinent Hx: This is a 67 y.o. female with refractory back and left leg pain from degenerative disc disease and lumbar stenosis from L1-5. Patient has failed full conservative therapy including medication management, physical therapy, and epidural steroid injections. Due to the persistence of symptoms and reduction in the ADLs, patient elected surgical treatment. Patient, therefore, understood indications for the surgery as well as its risks, benefits, and alternatives. These risks include but are not limited to paralysis, infection, hematoma, dural tear, nerve root injury, nonunion, DVT/PE, stroke, MI, death etc.  All questions were answered. Informed consent was obtained. Pt s/p L1-5 bilateral laminectomy, partial medial facetectomies and foraminotomies of L1, L2, L3, L4 and L5 nerve roots, L1-5 posterior spinal fusion,  L1-5 posterior spinal instrumentation, Streamline, SurgAlign instrumentation, Use of local autograft bone and crushed cancellous chips    Restrictions/Precautions:  Restrictions/Precautions: Fall Risk, General Precautions  Required Braces or Orthoses  Spinal: Lumbar Corset  Position Activity Restriction  Spinal Precautions: No Bending, No Lifting, No Twisting  Other position/activity restrictions: Lumbar Corset when up    Subjective  Chart Reviewed: Yes, Orders, Progress Notes, History and Physical, Operative Notes  Patient assessed for rehabilitation services?: Yes    Subjective: RN okayed session. Pt was resting in bed upon arrival, very pleasant and agreeable to OTHernesto Callahan to get OOB. Pain: 4/10    Vitals: Vitals not assessed per clinical judgement, see nursing flowsheet    Social/Functional History:  Lives With: Alone  Type of Home: Trailer  Home Layout: One level  Home Access: Ramped entrance, Stairs to enter with rails  Entrance Stairs - Number of Steps: 4  Entrance Stairs - Rails: Both  Home Equipment: Sarajane Shay, rolling, Sock aid, Grab bars, Long-handled shoehorn, Reacher   Bathroom Shower/Tub: Walk-in shower  Bathroom Toilet: Handicap height  Bathroom Equipment: Built-in shower seat, Grab bars in shower, Grab bars around toilet, 3-in-1 commode  Bathroom Accessibility: Accessible       ADL Assistance: [address]: Independent  Homemaking Responsibilities: Yes  Ambulation Assistance: Independent  Transfer Assistance: Independent    Active : Yes          VISION:Corrected    HEARING:  WFL    COGNITION: WFL    RANGE OF MOTION:  Bilateral Upper Extremity:  WFL    STRENGTH:  Bilateral Upper Extremity:  grossly deconditioned    SENSATION:   WFL    ADL:   Lower Extremity Dressing: Maximum Assistance. For donning slipper socks while seated on EOB . BALANCE:  Sitting Balance:  Stand By Assistance. Seated on EOB  Standing Balance: Contact Guard Assistance. With 1-2 UE release, steady throughout. Pt tolerated x2 standing trials x2min each    BED MOBILITY:  Rolling to Left: Minimal Assistance with VC for use of bed rail, good understanding of log roll technique  Supine to Sit: Minimal Assistance with increased time  Scooting: Minimal Assistance      TRANSFERS:  Sit to Stand:  Contact Guard Assistance. From various surfaces  Stand to Sit: Contact Guard Assistance. To various surfaces    FUNCTIONAL MOBILITY:  Assistive Device: Rolling Walker  Assist Level:  Contact Guard Assistance.    Distance:  from EOB to recliner, and around room  Slow pace initially, no LOB     Additional Activities:  Pt educated on use of LHAE for ensuring maintenance of back precautions during ADLs. Pt reported that she has all LHAE at home and is familiar with use. Pt demoed good understanding of back precautions and uses of equipment. Pt verbalized concerns with toileting, educated on use of toileting aid to ensure maintenance of back precautions. Pt verbalized understanding, reporting her dtr lives behind her and is a RN who is able to assist prn. No additional questions presented at this time. Activity Tolerance:  Patient tolerance of  treatment: good. Assessment:  Assessment: Pt presented with the listed deficits s/p admission with lumbar stenosis. Pt with decreased strength, endurance, and activity tolerance and currently requires increased A with ADLs and IADLs. Pt would benefit from continued OT to restore PLOF maximize pt's indep with ADLs and IADLs in prep for a safe return home at max level of indep. Performance deficits / Impairments: Decreased functional mobility , Decreased safe awareness, Decreased balance, Decreased posture, Decreased ADL status, Decreased endurance, Decreased high-level IADLs, Decreased strength  Prognosis: Good  REQUIRES OT FOLLOW-UP: Yes    Treatment Initiated: Treatment and education initiated within context of evaluation. Evaluation time included review of current medical information, gathering information related to past medical, social and functional history, completion of standardized testing, formal and informal observation of tasks, assessment of data and development of plan of care and goals. Treatment time included skilled education and facilitation of tasks to increase safety and independence with ADL's for improved functional independence and quality of life.     Discharge Recommendations:  Continue to assess pending progress, Home with Home health OT    Patient Education:     Patient Education  Education Given To: Patient  Education Provided: Role of Therapy, Plan of Care, ADL Adaptive Strategies, Transfer Training, Energy Conservation, IADL Safety  Education Method: Demonstration  Barriers to Learning: None  Education Outcome: Verbalized understanding    Equipment Recommendations:  Equipment Needed: No    Plan:  Times per Week: 5x  Times per Day: Daily  Current Treatment Recommendations: Strengthening, Balance training, Functional mobility training, Self-Care / ADL, Safety education & training. See long-term goal time frame for expected duration of plan of care. If no long-term goals established, a short length of stay is anticipated. Goals:  Patient goals : return home  Short Term Goals  Time Frame for Short term goals: by discharge  Short Term Goal 1: Pt will complete toileting routine, including t/fs, with no > than Min A and min VC for back precautions to increase indep with self care  Short Term Goal 2: Pt will tolerate dynamic standing x5-6min with SBA and B hand release to increase indep with grooming  Short Term Goal 3: Pt will safely navigate to/from bathroom with SBA and no LOB to increase indep with ADLs  Short Term Goal 4: Pt will complete LB ADLs with LHAE prn, Min A, and no VC for back precautions to increase indep with dressing         Following session, patient left in safe position with all fall risk precautions in place.

## 2022-08-14 LAB
ANION GAP SERPL CALCULATED.3IONS-SCNC: 13 MEQ/L (ref 8–16)
BUN BLDV-MCNC: 7 MG/DL (ref 7–22)
CALCIUM SERPL-MCNC: 8.3 MG/DL (ref 8.5–10.5)
CHLORIDE BLD-SCNC: 101 MEQ/L (ref 98–111)
CO2: 20 MEQ/L (ref 23–33)
CREAT SERPL-MCNC: 0.3 MG/DL (ref 0.4–1.2)
GFR SERPL CREATININE-BSD FRML MDRD: > 90 ML/MIN/1.73M2
GLUCOSE BLD-MCNC: 99 MG/DL (ref 70–108)
HCT VFR BLD CALC: 36.2 % (ref 37–47)
HEMOGLOBIN: 11.7 GM/DL (ref 12–16)
POTASSIUM SERPL-SCNC: 5 MEQ/L (ref 3.5–5.2)
SODIUM BLD-SCNC: 134 MEQ/L (ref 135–145)

## 2022-08-14 PROCEDURE — 51798 US URINE CAPACITY MEASURE: CPT

## 2022-08-14 PROCEDURE — 80048 BASIC METABOLIC PNL TOTAL CA: CPT

## 2022-08-14 PROCEDURE — 6370000000 HC RX 637 (ALT 250 FOR IP): Performed by: PHYSICIAN ASSISTANT

## 2022-08-14 PROCEDURE — 1200000000 HC SEMI PRIVATE

## 2022-08-14 PROCEDURE — 85018 HEMOGLOBIN: CPT

## 2022-08-14 PROCEDURE — 97116 GAIT TRAINING THERAPY: CPT

## 2022-08-14 PROCEDURE — 6360000002 HC RX W HCPCS: Performed by: PHYSICIAN ASSISTANT

## 2022-08-14 PROCEDURE — 85014 HEMATOCRIT: CPT

## 2022-08-14 PROCEDURE — 36415 COLL VENOUS BLD VENIPUNCTURE: CPT

## 2022-08-14 PROCEDURE — 2580000003 HC RX 258: Performed by: PHYSICIAN ASSISTANT

## 2022-08-14 RX ADMIN — SODIUM CHLORIDE, PRESERVATIVE FREE 10 ML: 5 INJECTION INTRAVENOUS at 20:09

## 2022-08-14 RX ADMIN — OXYCODONE 10 MG: 5 TABLET ORAL at 19:20

## 2022-08-14 RX ADMIN — OXYCODONE 10 MG: 5 TABLET ORAL at 05:26

## 2022-08-14 RX ADMIN — AMLODIPINE BESYLATE 2.5 MG: 2.5 TABLET ORAL at 08:27

## 2022-08-14 RX ADMIN — SENNOSIDES AND DOCUSATE SODIUM 1 TABLET: 50; 8.6 TABLET ORAL at 20:09

## 2022-08-14 RX ADMIN — CYCLOBENZAPRINE 10 MG: 10 TABLET, FILM COATED ORAL at 14:47

## 2022-08-14 RX ADMIN — ALPRAZOLAM 0.25 MG: 0.25 TABLET ORAL at 01:00

## 2022-08-14 RX ADMIN — OXYCODONE 10 MG: 5 TABLET ORAL at 01:00

## 2022-08-14 RX ADMIN — OXYCODONE 10 MG: 5 TABLET ORAL at 11:31

## 2022-08-14 RX ADMIN — CYCLOBENZAPRINE 10 MG: 10 TABLET, FILM COATED ORAL at 20:09

## 2022-08-14 RX ADMIN — BISACODYL 5 MG: 5 TABLET, COATED ORAL at 08:27

## 2022-08-14 RX ADMIN — FAMOTIDINE 20 MG: 20 TABLET ORAL at 08:27

## 2022-08-14 RX ADMIN — MORPHINE SULFATE 4 MG: 4 INJECTION, SOLUTION INTRAMUSCULAR; INTRAVENOUS at 07:59

## 2022-08-14 RX ADMIN — POLYETHYLENE GLYCOL 3350 17 G: 17 POWDER, FOR SOLUTION ORAL at 08:26

## 2022-08-14 RX ADMIN — SODIUM CHLORIDE, PRESERVATIVE FREE 10 ML: 5 INJECTION INTRAVENOUS at 08:26

## 2022-08-14 RX ADMIN — OXYCODONE 10 MG: 5 TABLET ORAL at 15:34

## 2022-08-14 RX ADMIN — SENNOSIDES AND DOCUSATE SODIUM 1 TABLET: 50; 8.6 TABLET ORAL at 08:27

## 2022-08-14 RX ADMIN — CYCLOBENZAPRINE 10 MG: 10 TABLET, FILM COATED ORAL at 05:26

## 2022-08-14 RX ADMIN — MORPHINE SULFATE 4 MG: 4 INJECTION, SOLUTION INTRAMUSCULAR; INTRAVENOUS at 23:03

## 2022-08-14 ASSESSMENT — PAIN SCALES - GENERAL
PAINLEVEL_OUTOF10: 7
PAINLEVEL_OUTOF10: 7
PAINLEVEL_OUTOF10: 8
PAINLEVEL_OUTOF10: 9
PAINLEVEL_OUTOF10: 9
PAINLEVEL_OUTOF10: 8
PAINLEVEL_OUTOF10: 7
PAINLEVEL_OUTOF10: 8
PAINLEVEL_OUTOF10: 7
PAINLEVEL_OUTOF10: 10

## 2022-08-14 ASSESSMENT — PAIN DESCRIPTION - LOCATION
LOCATION: BACK
LOCATION: BACK
LOCATION: NECK
LOCATION: NECK;HEAD
LOCATION: LEG
LOCATION: HEAD;NECK

## 2022-08-14 ASSESSMENT — PAIN DESCRIPTION - DESCRIPTORS
DESCRIPTORS: SPASM
DESCRIPTORS: ACHING
DESCRIPTORS: THROBBING
DESCRIPTORS: DISCOMFORT

## 2022-08-14 ASSESSMENT — PAIN - FUNCTIONAL ASSESSMENT: PAIN_FUNCTIONAL_ASSESSMENT: ACTIVITIES ARE NOT PREVENTED

## 2022-08-14 ASSESSMENT — PAIN DESCRIPTION - PAIN TYPE: TYPE: CHRONIC PAIN

## 2022-08-14 ASSESSMENT — PAIN DESCRIPTION - ORIENTATION
ORIENTATION: LEFT
ORIENTATION: MID

## 2022-08-14 NOTE — PLAN OF CARE
Problem: Discharge Planning  Goal: Discharge to home or other facility with appropriate resources  Outcome: Progressing  Flowsheets (Taken 8/14/2022 1550)  Discharge to home or other facility with appropriate resources:   Identify barriers to discharge with patient and caregiver   Identify discharge learning needs (meds, wound care, etc)   Refer to discharge planning if patient needs post-hospital services based on physician order or complex needs related to functional status, cognitive ability or social support system   Arrange for needed discharge resources and transportation as appropriate     Problem: Pain  Goal: Verbalizes/displays adequate comfort level or baseline comfort level  Outcome: Progressing  Flowsheets (Taken 8/14/2022 1550)  Verbalizes/displays adequate comfort level or baseline comfort level:   Encourage patient to monitor pain and request assistance   Administer analgesics based on type and severity of pain and evaluate response   Consider cultural and social influences on pain and pain management   Assess pain using appropriate pain scale   Implement non-pharmacological measures as appropriate and evaluate response   Notify Licensed Independent Practitioner if interventions unsuccessful or patient reports new pain     Problem: ABCDS Injury Assessment  Goal: Absence of physical injury  Outcome: Progressing  Flowsheets (Taken 8/14/2022 1550)  Absence of Physical Injury: Implement safety measures based on patient assessment     Problem: Musculoskeletal - Adult  Goal: Return mobility to safest level of function  Outcome: Progressing  Flowsheets (Taken 8/14/2022 1550)  Return Mobility to Safest Level of Function:   Assess patient stability and activity tolerance for standing, transferring and ambulating with or without assistive devices   Assist with transfers and ambulation using safe patient handling equipment as needed   Ensure adequate protection for wounds/incisions during mobilization   Obtain physical therapy/occupational therapy consults as needed   Instruct patient/family in ordered activity level  Goal: Maintain proper alignment of affected body part  Outcome: Progressing  Flowsheets (Taken 8/14/2022 1550)  Maintain proper alignment of affected body part:   Support and protect limb and body alignment per provider's orders   Instruct and reinforce with patient and family use of appropriate assistive device and precautions (e.g. spinal or hip dislocation precautions)     Problem: Gastrointestinal - Adult  Goal: Maintains or returns to baseline bowel function  Outcome: Progressing  Flowsheets (Taken 8/14/2022 1550)  Maintains or returns to baseline bowel function:   Assess bowel function   Encourage oral fluids to ensure adequate hydration   Administer ordered medications as needed   Encourage mobilization and activity     Problem: Genitourinary - Adult  Goal: Absence of urinary retention  Outcome: Progressing  Flowsheets (Taken 8/14/2022 1550)  Absence of urinary retention:   Assess patients ability to void and empty bladder   Monitor intake/output and perform bladder scan as needed     Problem: Infection - Adult  Goal: Absence of infection during hospitalization  Outcome: Progressing  Flowsheets (Taken 8/14/2022 1550)  Absence of infection during hospitalization:   Assess and monitor for signs and symptoms of infection   Monitor lab/diagnostic results   Monitor all insertion sites i.e., indwelling lines, tubes and drains   Instruct and encourage patient and family to use good hand hygiene technique     Problem: Safety - Adult  Goal: Free from fall injury  Outcome: Progressing  Flowsheets (Taken 8/14/2022 1550)  Free From Fall Injury: Instruct family/caregiver on patient safety     Problem: Skin/Tissue Integrity - Adult  Goal: Incisions, wounds, or drain sites healing without S/S of infection  Outcome: Progressing  Flowsheets (Taken 8/14/2022 1550)  Incisions, Wounds, or Drain Sites Healing Without Sign and Symptoms of Infection:   ADMISSION and DAILY: Assess and document risk factors for pressure ulcer development   TWICE DAILY: Assess and document skin integrity   TWICE DAILY: Assess and document dressing/incision, wound bed, drain sites and surrounding tissue     Problem: Hematologic - Adult  Goal: Maintains hematologic stability  Outcome: Progressing  Flowsheets (Taken 8/14/2022 1550)  Maintains hematologic stability:   Assess for signs and symptoms of bleeding or hemorrhage   Monitor labs for bleeding or clotting disorders   Care plan reviewed with patient. Patient verbalizes understanding of the plan of care and contributes to goal setting.

## 2022-08-14 NOTE — PROGRESS NOTES
Department of Orthopedic Surgery  Spine Service  Attending Progress Note        Subjective:  POD#2 patient doing okay, c/o back pain. Sitting up with no HA. No BM. Hgb 11.7    Vitals  VITALS:  /80   Pulse (!) 103   Temp 97.7 °F (36.5 °C) (Oral)   Resp 18   Ht 5' 2\" (1.575 m)   Wt 162 lb 9.6 oz (73.8 kg)   SpO2 94%   BMI 29.74 kg/m²   24HR INTAKE/OUTPUT:    Intake/Output Summary (Last 24 hours) at 8/14/2022 1152  Last data filed at 8/14/2022 0426  Gross per 24 hour   Intake 600 ml   Output 1490 ml   Net -890 ml       URINARY CATHETER OUTPUT (Melchor):  [REMOVED] Urinary Catheter Melchor-Output (mL): 300 mL  DRAIN/TUBE OUTPUT:  Closed/Suction Drain Inferior;Right Back Accordion-Output (ml): 100 ml  Closed/Suction Drain Inferior; Left Back Accordion-Output (ml): 150 ml      PHYSICAL EXAM:    Orientation:  alert and oriented to person, place and time    Incision:  dressing in place, clean, dry, intact      Lower Extremity Motor :  quadriceps, extensor hallucis longus, dorsiflexion, plantarflexion 5/5 bilaterally  Lower Extremity Sensory:  Intact L1-S1    Flatus:  positive    LABS:    HgB:    Lab Results   Component Value Date/Time    HGB 11.7 08/14/2022 07:56 AM     Hemoglobin/Hematocrit:    Lab Results   Component Value Date/Time    HGB 11.7 08/14/2022 07:56 AM    HCT 36.2 08/14/2022 07:56 AM     BMP:    Lab Results   Component Value Date/Time     08/14/2022 07:56 AM    K 5.0 08/14/2022 07:56 AM     08/14/2022 07:56 AM    CO2 20 08/14/2022 07:56 AM    BUN 7 08/14/2022 07:56 AM    LABALBU 4.4 09/02/2021 11:10 AM    LABALBU 4.5 07/15/2011 03:09 PM    CREATININE 0.3 08/14/2022 07:56 AM    CALCIUM 8.3 08/14/2022 07:56 AM    LABGLOM >90 08/14/2022 07:56 AM    GLUCOSE 99 08/14/2022 07:56 AM    GLUCOSE 92 07/15/2011 03:09 PM       ASSESSMENT AND PLAN:    Post operative day 2 status post L1-5 decompression and fusion    1:  Monitor labs and drain output (half compression on drain #1 and no compression on drain #2)  2:  Activity Level:  As tolerated. PT/OT, back brace when ambulating. If patient develops a headache, have her lay flat  3:  Pain Control:  Ok  4:  Discharge Planning:  Pending  5:  Continue cunningham until patient ambulatory    Elisabeth Emery PA-C

## 2022-08-14 NOTE — PROGRESS NOTES
[facility]  INPATIENT PHYSICAL THERAPY  DAILY NOTE  [facility] ORTHOPEDICS 7K - [id number]    Time In: 8379  Time Out: 1126  Timed Code Treatment Minutes: 23 Minutes  Minutes: 23          Date: 2022  Patient Name: Sarah Hussein,  Gender:  female        MRN: [id number]  : 1950  (67 y.o.)     Referring Practitioner: SAVANNA Urias  Diagnosis: Spinal stenosis of lumbar region, unspecified whether neurogenic claudication present  Additional Pertinent Hx: The patient is a 70-year-old female with complaints of constant low back pain that radiates pain into the left groin and left anterior thigh and down the left lateral leg to the ankle. Symptoms have been present on a chronic basis and worsening. Current VAS score 8/10. Percentage wise, 50% pain in the back and 50% left leg. Activities that aggravate the pain include standing. Rest helps relieve the pain. Modifying factors include medication management, PT and MARIA DEL CARMEN with really no relief. No bowel or bladder changes. Former smoker. She had a prior back surgery in .  L1-5 Decompression L1-5 Posterior Fusion    on  with dr sanchez     Prior Level of Function:  Lives With: Alone  Type of Home: Trailer  Home Layout: One level  Home Access: Ramped entrance, Stairs to enter with rails  Entrance Stairs - Number of Steps: 4  Entrance Stairs - Rails: Both  Home Equipment: Walker, rolling, Sock aid, Grab bars, Long-handled shoehorn, Reacher   Bathroom Shower/Tub: Walk-in shower  Bathroom Toilet: Handicap height  Bathroom Equipment: Built-in shower seat, Grab bars in shower, Grab bars around toilet, 3-in-1 commode  Bathroom Accessibility: Accessible    ADL Assistance: [address] [place] [address] Avenue: Independent  Homemaking Responsibilities: Yes  Ambulation Assistance: Independent  Transfer Assistance: Independent  Active : Yes    Restrictions/Precautions:  Restrictions/Precautions: Fall Risk, General Precautions  Required Braces or Orthoses  Spinal: Lumbar Corset  Position Activity Restriction  Spinal Precautions: No Bending, No Lifting, No Twisting  Other position/activity restrictions: Lumbar Corset when up     SUBJECTIVE: RN approved session. Pt laying in bed upon arrival, pleasant and agreeable to therapy. Pt reports she had a slight headache earlier this morning but it went away. PAIN: 3/10: back     Vitals: Vitals not assessed per clinical judgement, see nursing flowsheet    OBJECTIVE:  Bed Mobility:  Supine to Sit: Contact Guard Assistance, X 1, with head of bed raised, with rail, with verbal cues     Transfers:  Sit to Stand: Stand By Assistance, X 1, cues for hand placement  Stand to Sit:Stand By Assistance, X 1, cues for hand placement    Ambulation:  Stand By Assistance  Distance: 400+  Surface: Level Tile  Device:Rolling Walker  Gait Deviations:  Slow [place] and mild Antalgic gait     Balance:  Static Sitting Balance:  Supervision  Static Standing Balance: Stand By Assistance    Stairs:  Stand By Assistance  Number of Steps: 4  Height: 6\" step with Bilateral Handrailss    Functional Outcome Measures: Completed  -PAC Inpatient Mobility Raw Score : 18  AM-PAC Inpatient T-Scale Score : 43.63    ASSESSMENT:  Assessment: Patient progressing toward established goals. Activity Tolerance:  Patient tolerance of  treatment: good.  Pt with no significant LOB or SOB noted      Equipment Recommendations:Equipment Needed: No  Discharge Recommendations: Home with Assist as Needed  Plan: Current Treatment Recommendations: Strengthening, Balance training, Gait training, Stair training, Functional mobility training  Plan:  (6x O)    Patient Education  Patient Education: Plan of Care, Precautions/Restrictions, Bed Mobility, Transfers, Gait, Stairs    Goals:  Patient goals : none stated  Short Term Goals  Time Frame for Short term goals: by discharge  Short term goal 1: bed mobility with HOB flat, no rails, mod I for increased functional ind  Short term goal 2: sit<>stand from various surfaces with LRD mod I for safe transfers  Short term goal 3: ambulate 200' with LRD mod I for safe household distances  Short term goal 4: navigate 3 steps with LRD mod I for safe enter/exit of home  Long Term Goals  Time Frame for Long term goals : NA d/t short ELOS    Following session, patient left in safe position with all fall risk precautions in place.     Zaida Castro (Truex) PT, DPT

## 2022-08-14 NOTE — PROGRESS NOTES
INTERNAL MEDICINE Progress Note  8/14/2022 12:31 PM  Subjective:   Admit Date: 8/12/2022  PCP: Rodolfo Fung MD  Interval History:   Doing well, no HA,   no legs weakness    Objective:   Vitals: /80   Pulse (!) 103   Temp 97.7 °F (36.5 °C) (Oral)   Resp 18   Ht 5' 2\" (1.575 m)   Wt 162 lb 9.6 oz (73.8 kg)   SpO2 94%   BMI 29.74 kg/m²   General appearance: alert and cooperative with exam  HEENT: Head: Normocephalic, without obvious abnormality  Neck: no adenopathy, no carotid bruit, no JVD, and supple, symmetrical, trachea midline  Lungs: clear to auscultation bilaterally  Heart: regular rate and rhythm, S1, S2 normal, no murmur, click, rub or gallop  Abdomen: soft, non-tender; bowel sounds normal; no masses,  no organomegaly  Extremities: extremities normal, atraumatic, no cyanosis or edema  Neurologic: Mental status: Alert, oriented, thought content appropriate      Medications:   Scheduled Meds:   famotidine  20 mg Oral Daily    sodium chloride flush  5-40 mL IntraVENous 2 times per day    polyethylene glycol  17 g Oral Daily    bisacodyl  5 mg Oral Daily    sennosides-docusate sodium  1 tablet Oral BID    amLODIPine  2.5 mg Oral Daily     Continuous Infusions:   sodium chloride         Lab Results:   CBC:   Recent Labs     08/13/22  0635 08/14/22  0756   HGB 12.5 11.7*     BMP:    Recent Labs     08/13/22  0635 08/14/22  0756    134*   K 3.7 5.0    101   CO2 20* 20*   BUN 8 7   CREATININE 0.4 0.3*   GLUCOSE 112* 99         Assessment and Plan:   L1-S1 degenerative disc disease, facet arthropathy, disc protrusions with stenosis and neurogenic claudication. S/p L1-5 Decompression L1-5 PSF  HTN    Plan:  Continue with amlodipine. Hold for systolic blood pressure less than 120  Bowel regimen  SCDs.         Alea Kemp MD, MD

## 2022-08-15 LAB
ANION GAP SERPL CALCULATED.3IONS-SCNC: 8 MEQ/L (ref 8–16)
BUN BLDV-MCNC: 8 MG/DL (ref 7–22)
CALCIUM SERPL-MCNC: 8.5 MG/DL (ref 8.5–10.5)
CHLORIDE BLD-SCNC: 99 MEQ/L (ref 98–111)
CO2: 30 MEQ/L (ref 23–33)
CREAT SERPL-MCNC: 0.4 MG/DL (ref 0.4–1.2)
GFR SERPL CREATININE-BSD FRML MDRD: > 90 ML/MIN/1.73M2
GLUCOSE BLD-MCNC: 123 MG/DL (ref 70–108)
HCT VFR BLD CALC: 32.8 % (ref 37–47)
HEMOGLOBIN: 11 GM/DL (ref 12–16)
POTASSIUM SERPL-SCNC: 5.1 MEQ/L (ref 3.5–5.2)
SODIUM BLD-SCNC: 137 MEQ/L (ref 135–145)

## 2022-08-15 PROCEDURE — 97535 SELF CARE MNGMENT TRAINING: CPT

## 2022-08-15 PROCEDURE — 2580000003 HC RX 258: Performed by: PHYSICIAN ASSISTANT

## 2022-08-15 PROCEDURE — 6370000000 HC RX 637 (ALT 250 FOR IP): Performed by: PHYSICIAN ASSISTANT

## 2022-08-15 PROCEDURE — 6360000002 HC RX W HCPCS

## 2022-08-15 PROCEDURE — 2580000003 HC RX 258: Performed by: INTERNAL MEDICINE

## 2022-08-15 PROCEDURE — 80048 BASIC METABOLIC PNL TOTAL CA: CPT

## 2022-08-15 PROCEDURE — 97116 GAIT TRAINING THERAPY: CPT

## 2022-08-15 PROCEDURE — 85018 HEMOGLOBIN: CPT

## 2022-08-15 PROCEDURE — 1200000000 HC SEMI PRIVATE

## 2022-08-15 PROCEDURE — 85014 HEMATOCRIT: CPT

## 2022-08-15 PROCEDURE — 36415 COLL VENOUS BLD VENIPUNCTURE: CPT

## 2022-08-15 RX ORDER — SODIUM CHLORIDE 9 MG/ML
INJECTION, SOLUTION INTRAVENOUS CONTINUOUS
Status: ACTIVE | OUTPATIENT
Start: 2022-08-15 | End: 2022-08-16

## 2022-08-15 RX ORDER — DEXAMETHASONE SODIUM PHOSPHATE 4 MG/ML
6 INJECTION, SOLUTION INTRA-ARTICULAR; INTRALESIONAL; INTRAMUSCULAR; INTRAVENOUS; SOFT TISSUE EVERY 8 HOURS
Status: COMPLETED | OUTPATIENT
Start: 2022-08-15 | End: 2022-08-16

## 2022-08-15 RX ADMIN — CYCLOBENZAPRINE 10 MG: 10 TABLET, FILM COATED ORAL at 20:48

## 2022-08-15 RX ADMIN — SODIUM CHLORIDE: 9 INJECTION, SOLUTION INTRAVENOUS at 20:52

## 2022-08-15 RX ADMIN — POLYETHYLENE GLYCOL 3350 17 G: 17 POWDER, FOR SOLUTION ORAL at 08:46

## 2022-08-15 RX ADMIN — OXYCODONE 10 MG: 5 TABLET ORAL at 08:40

## 2022-08-15 RX ADMIN — BISACODYL 5 MG: 5 TABLET, COATED ORAL at 08:41

## 2022-08-15 RX ADMIN — OXYCODONE 10 MG: 5 TABLET ORAL at 03:25

## 2022-08-15 RX ADMIN — SODIUM CHLORIDE, PRESERVATIVE FREE 10 ML: 5 INJECTION INTRAVENOUS at 20:48

## 2022-08-15 RX ADMIN — ONDANSETRON 4 MG: 4 TABLET, ORALLY DISINTEGRATING ORAL at 13:35

## 2022-08-15 RX ADMIN — BISACODYL 10 MG: 10 SUPPOSITORY RECTAL at 18:56

## 2022-08-15 RX ADMIN — FAMOTIDINE 20 MG: 20 TABLET ORAL at 08:41

## 2022-08-15 RX ADMIN — OXYCODONE 10 MG: 5 TABLET ORAL at 13:31

## 2022-08-15 RX ADMIN — SENNOSIDES AND DOCUSATE SODIUM 1 TABLET: 50; 8.6 TABLET ORAL at 08:46

## 2022-08-15 RX ADMIN — SENNOSIDES AND DOCUSATE SODIUM 1 TABLET: 50; 8.6 TABLET ORAL at 20:48

## 2022-08-15 RX ADMIN — SODIUM CHLORIDE, PRESERVATIVE FREE 10 ML: 5 INJECTION INTRAVENOUS at 08:55

## 2022-08-15 RX ADMIN — ALPRAZOLAM 0.25 MG: 0.25 TABLET ORAL at 03:32

## 2022-08-15 RX ADMIN — DEXAMETHASONE SODIUM PHOSPHATE 6 MG: 4 INJECTION, SOLUTION INTRA-ARTICULAR; INTRALESIONAL; INTRAMUSCULAR; INTRAVENOUS; SOFT TISSUE at 08:54

## 2022-08-15 RX ADMIN — DEXAMETHASONE SODIUM PHOSPHATE 6 MG: 4 INJECTION, SOLUTION INTRA-ARTICULAR; INTRALESIONAL; INTRAMUSCULAR; INTRAVENOUS; SOFT TISSUE at 16:00

## 2022-08-15 RX ADMIN — AMLODIPINE BESYLATE 2.5 MG: 2.5 TABLET ORAL at 08:41

## 2022-08-15 RX ADMIN — ALPRAZOLAM 0.25 MG: 0.25 TABLET ORAL at 13:31

## 2022-08-15 ASSESSMENT — PAIN SCALES - GENERAL
PAINLEVEL_OUTOF10: 6
PAINLEVEL_OUTOF10: 5
PAINLEVEL_OUTOF10: 5
PAINLEVEL_OUTOF10: 9
PAINLEVEL_OUTOF10: 8
PAINLEVEL_OUTOF10: 7

## 2022-08-15 ASSESSMENT — PAIN DESCRIPTION - PAIN TYPE
TYPE: ACUTE PAIN;SURGICAL PAIN
TYPE: ACUTE PAIN;CHRONIC PAIN;SURGICAL PAIN

## 2022-08-15 ASSESSMENT — PAIN DESCRIPTION - DESCRIPTORS
DESCRIPTORS: ACHING;SORE
DESCRIPTORS: ACHING;DISCOMFORT
DESCRIPTORS: ACHING;DISCOMFORT
DESCRIPTORS: ACHING
DESCRIPTORS: ACHING;DISCOMFORT
DESCRIPTORS: ACHING;DISCOMFORT

## 2022-08-15 ASSESSMENT — PAIN DESCRIPTION - LOCATION
LOCATION: BACK

## 2022-08-15 ASSESSMENT — PAIN DESCRIPTION - ONSET
ONSET: ON-GOING

## 2022-08-15 ASSESSMENT — PAIN - FUNCTIONAL ASSESSMENT
PAIN_FUNCTIONAL_ASSESSMENT: ACTIVITIES ARE NOT PREVENTED
PAIN_FUNCTIONAL_ASSESSMENT: PREVENTS OR INTERFERES SOME ACTIVE ACTIVITIES AND ADLS

## 2022-08-15 ASSESSMENT — PAIN DESCRIPTION - FREQUENCY
FREQUENCY: CONTINUOUS

## 2022-08-15 ASSESSMENT — PAIN DESCRIPTION - ORIENTATION
ORIENTATION: LOWER;MID
ORIENTATION: MID;LOWER
ORIENTATION: LOWER;MID
ORIENTATION: MID;LOWER
ORIENTATION: LOWER

## 2022-08-15 NOTE — PROGRESS NOTES
[address] [facility]  INPATIENT PHYSICAL THERAPY  DAILY NOTE  [facility] ORTHOPEDICS 7K - [id number]    Time In: 1161  Time Out: 7385  Timed Code Treatment Minutes: 23 Minutes  Minutes: 23          Date: 8/15/2022  Patient Name: Svetlana Barber,  Gender:  female        MRN: [id number]  : 1950  (67 y.o.)     Referring Practitioner: SAVANNA Guerrero  Diagnosis: Spinal stenosis of lumbar region, unspecified whether neurogenic claudication present  Additional Pertinent Hx: The patient is a 66-year-old female with complaints of constant low back pain that radiates pain into the left groin and left anterior thigh and down the left lateral leg to the ankle. Symptoms have been present on a chronic basis and worsening. Current VAS score 8/10. Percentage wise, 50% pain in the back and 50% left leg. Activities that aggravate the pain include standing. Rest helps relieve the pain. Modifying factors include medication management, PT and MARIA DEL CARMEN with really no relief. No bowel or bladder changes. Former smoker. She had a prior back surgery in .  L1-5 Decompression L1-5 Posterior Fusion    on  with dr sanchez     Prior Level of Function:  Lives With: Alone  Type of Home: Trailer  Home Layout: One level  Home Access: Ramped entrance, Stairs to enter with rails  Entrance Stairs - Number of Steps: 4  Entrance Stairs - Rails: Both  Home Equipment: Walker, rolling, Sock aid, Grab bars, Long-handled shoehorn, Reacher   Bathroom Shower/Tub: Walk-in shower  Bathroom Toilet: Handicap height  Bathroom Equipment: Built-in shower seat, Grab bars in shower, Grab bars around toilet, 3-in-1 commode  Bathroom Accessibility: Accessible    ADL Assistance: [address] [place] [address] Avenue: Independent  Homemaking Responsibilities: Yes  Ambulation Assistance: Independent  Transfer Assistance: Independent  Active : Yes    Restrictions/Precautions:  Restrictions/Precautions: Fall Risk, General Precautions  Required Braces or Orthoses  Spinal: Lumbar Corset  Position Activity Restriction  Spinal Precautions: No Bending, No Lifting, No Twisting  Other position/activity restrictions: Lumbar Corset when up      SUBJECTIVE: RN approved session. Pt pleasant and agreeable to therapy. Pt reports that she did have a headache last night around 1am when she was sleeping but it resolved when they gave her morphine. Pt with no complaints initially and then at the top of the stairs, pt reports dizziness. Pt assisted down the stairs and to a wheelchair. Pt vitals assessed, see below, and taken back to room and assisted to bed. PAIN: 3/10: back     Vitals: Blood Pressure: 131/78 (88)  Oxygen: 97%  Heart Rate: 104    OBJECTIVE:  Bed Mobility:  Supine to Sit: Stand By Assistance, X 1, with head of bed flat, without rail  Sit to Supine: Stand By Assistance, X 1, with head of bed flat, without rail   HOB flat, no rails used to mimic home set up. Pt did well with SBA and very minimal verbal cues for sequencing. Pt maintained spinal precautions and demonstrated proper log roll technique. Transfers:  Sit to Stand: Stand By Assistance, X 1  Stand to Sit:Stand By Assistance, X 1    Ambulation:  Stand By Assistance  Distance: 40'   Surface: Level Tile  Device:Rolling Walker  Gait Deviations:  Slow [place]anna and Decreased Gait Speed    Stairs:  Stand By Assistance  Number of Steps: 4  Height: 6\" step with One Handrail    Balance:  Static Sitting Balance:  Supervision  Static Standing Balance: Stand By Assistance      Functional Outcome Measures: Completed  AM-PAC Inpatient Mobility Raw Score : 18  AM-PAC Inpatient T-Scale Score : 43.63    ASSESSMENT:  Assessment: Patient progressing toward established goals. Activity Tolerance:  Patient tolerance of  treatment: fair.  Pt limited this session d/t some dizziness reported      Equipment Recommendations:Equipment Needed: No  Discharge Recommendations: Home with Outpatient PT  Plan: Current Treatment Recommendations: Strengthening, Balance training, Gait training, Stair training, Functional mobility training  Plan:  (6x O)    Patient Education  Patient Education: Plan of Care, Precautions/Restrictions, Bed Mobility, Transfers, Gait, Stairs    Goals:  Patient goals : none stated  Short Term Goals  Time Frame for Short term goals: by discharge  Short term goal 1: bed mobility with HOB flat, no rails, mod I for increased functional ind  Short term goal 2: sit<>stand from various surfaces with LRD mod I for safe transfers  Short term goal 3: ambulate 200' with LRD mod I for safe household distances  Short term goal 4: navigate 3 steps with LRD mod I for safe enter/exit of home  Long Term Goals  Time Frame for Long term goals : NA d/t short ELOS    Following session, patient left in safe position with all fall risk precautions in place.     Estrada Gloria (Truex) PT, DPT

## 2022-08-15 NOTE — CARE COORDINATION
8/15/22, 8:51 AM EDT    [address] day: 3  Location: [facility]/019-A Reason for admit: Spinal stenosis of lumbar region, unspecified whether neurogenic claudication present [M48.061]  Lumbar stenosis with neurogenic claudication [M48.062]   Procedure:   8/12  L1-S1 degenerative disc disease, facet arthropathy, disc protrusions with stenosis and neurogenic claudication. Barriers to Discharge: POD 3, monitor drain outputs, 1/2 compression on drain #1, begin Decadron IV, bowel regimen. Back brace. PCP: Jimmie Corona MD  Readmission Risk Score: 6.7%  Patient Goals/Plan/Treatment Preferences: from home; [facility] accepted pt. Will follow.

## 2022-08-15 NOTE — PROGRESS NOTES
08/15/2022 05:50 AM    CALCIUM 8.5 08/15/2022 05:50 AM    LABGLOM >90 08/15/2022 05:50 AM    GLUCOSE 123 08/15/2022 05:50 AM    GLUCOSE 92 07/15/2011 03:09 PM       ASSESSMENT AND PLAN:    Post operative day 3 status post L1-5 decompression and fusion    1:  Monitor labs and drain output (half compression on drain #1 and no compression on drain #2)  2:  Activity Level:  As tolerated. PT/OT, back brace when ambulating. If patient develops a headache, have her lay flat  3:  Pain Control:  Ok, left anterior thigh pain - Start decadron. 4:  Discharge Planning:  Pending - possibly ECF   5:   Will consider cunningham placement if pt is not voiding throughout today  6:  Continue bowel regimen: give suppository    Racheal Chamberlain PA-C

## 2022-08-15 NOTE — PROGRESS NOTES
[facility]  Occupational Therapy  Daily Note  Time:   Time In: 901  Time Out: 930  Timed Code Treatment Minutes: 29 Minutes  Minutes: 29          Date: 8/15/2022  Patient Name: Lexie Lozano,   Gender: female      Room: [facility]/019-A  MRN: [id number]  : 1950  (67 y.o.)  Referring Practitioner: SAVANNA Snider  Diagnosis: Lumbar Stenosis with neurogenic claudication  Additional Pertinent Hx: This is a 67 y.o. female with refractory back and left leg pain from degenerative disc disease and lumbar stenosis from L1-5. Patient has failed full conservative therapy including medication management, physical therapy, and epidural steroid injections. Due to the persistence of symptoms and reduction in the ADLs, patient elected surgical treatment. Patient, therefore, understood indications for the surgery as well as its risks, benefits, and alternatives. These risks include but are not limited to paralysis, infection, hematoma, dural tear, nerve root injury, nonunion, DVT/PE, stroke, MI, death etc.  All questions were answered. Informed consent was obtained. Pt s/p L1-5 bilateral laminectomy, partial medial facetectomies and foraminotomies of L1, L2, L3, L4 and L5 nerve roots, L1-5 posterior spinal fusion,  L1-5 posterior spinal instrumentation, Streamline, SurgAlign instrumentation, Use of local autograft bone and crushed cancellous chips    Restrictions/Precautions:  Restrictions/Precautions: Fall Risk, General Precautions  Required Braces or Orthoses  Spinal: Lumbar Corset  Position Activity Restriction  Spinal Precautions: No Bending, No Lifting, No Twisting  Other position/activity restrictions: Lumbar Corset when up     SUBJECTIVE: RN okayed OT treatment. Pt. In room seated on EOB cooperative and agreeable to participate.      PAIN: /10: low back already medicated    Vitals: Vitals not assessed per clinical judgement, see nursing flowsheet    COGNITION: WFL    ADL: Grooming: Stand By Assistance and with set-up. To complete oral care, CGA to stand at sink and wash hands off  Upper Extremity Dressing: Minimal Assistance. To don corset  Lower Extremity Dressing:Min A after demo with LHAE  Toileting: Contact Guard Assistance. Toilet Transfer: Contact Guard Assistance. Emmanuel Melinda BALANCE:  Sitting Balance:  Stand By Assistance. Seated on EOB  Standing Balance: Contact Guard Assistance. BED MOBILITY:  Not Tested    TRANSFERS:  Sit to Stand:  Contact Guard Assistance. Stand to Sit: Contact Guard Assistance. FUNCTIONAL MOBILITY:  Assistive Device: Rolling Walker  Assist Level:  Contact Guard Assistance. Distance: To and from bathroom and and in [facility] no LOB good pace. ADDITIONAL ACTIVITIES:  Pt. Instructed on LHAE use and adaptive techniuqes in the home. Pt. Familiar with AE and has in the home. Pt. Trialed donning and doffing socks and shorts with LHAE use requiring min A and moderate vcs overall to complete. Pt. Able to recall 2/3 precautions. ASSESSMENT:     Activity Tolerance:  Patient tolerance of  treatment: good. Discharge Recommendations: Home with assist as needed and Home with Home Health OT  Equipment Recommendations: Equipment Needed: No  Plan: Times per Week: 5x  Times per Day: Daily  Current Treatment Recommendations: Strengthening, Balance training, Functional mobility training, Self-Care / ADL, Safety education & training    Patient Education  Patient Education: ADL's, Precautions, Equipment Education, Home Safety, and Assistive Device Safety and safety with functional mobility and transfers.      Goals  Short Term Goals  Time Frame for Short term goals: by discharge  Short Term Goal 1: Pt will complete toileting routine, including t/fs, with no > than Min A and min VC for back precautions to increase indep with self care  Short Term Goal 2: Pt will tolerate dynamic standing x5-6min with SBA and B hand release to increase indep with grooming  Short Term Goal 3: Pt will safely navigate to/from bathroom with SBA and no LOB to increase indep with ADLs  Short Term Goal 4: Pt will complete LB ADLs with LHAE prn, Min A, and no VC for back precautions to increase indep with dressing    Following session, patient left in safe position with all fall risk precautions in place.

## 2022-08-16 LAB
ANION GAP SERPL CALCULATED.3IONS-SCNC: 10 MEQ/L (ref 8–16)
BUN BLDV-MCNC: 9 MG/DL (ref 7–22)
CALCIUM SERPL-MCNC: 8.5 MG/DL (ref 8.5–10.5)
CHLORIDE BLD-SCNC: 104 MEQ/L (ref 98–111)
CO2: 24 MEQ/L (ref 23–33)
CREAT SERPL-MCNC: 0.3 MG/DL (ref 0.4–1.2)
GFR SERPL CREATININE-BSD FRML MDRD: > 90 ML/MIN/1.73M2
GLUCOSE BLD-MCNC: 141 MG/DL (ref 70–108)
HCT VFR BLD CALC: 32.8 % (ref 37–47)
HEMOGLOBIN: 11 GM/DL (ref 12–16)
POTASSIUM SERPL-SCNC: 4.2 MEQ/L (ref 3.5–5.2)
SODIUM BLD-SCNC: 138 MEQ/L (ref 135–145)

## 2022-08-16 PROCEDURE — 1200000000 HC SEMI PRIVATE

## 2022-08-16 PROCEDURE — 6360000002 HC RX W HCPCS

## 2022-08-16 PROCEDURE — 6370000000 HC RX 637 (ALT 250 FOR IP): Performed by: PHYSICIAN ASSISTANT

## 2022-08-16 PROCEDURE — 97116 GAIT TRAINING THERAPY: CPT

## 2022-08-16 PROCEDURE — 36415 COLL VENOUS BLD VENIPUNCTURE: CPT

## 2022-08-16 PROCEDURE — 85018 HEMOGLOBIN: CPT

## 2022-08-16 PROCEDURE — 2580000003 HC RX 258: Performed by: PHYSICIAN ASSISTANT

## 2022-08-16 PROCEDURE — 2580000003 HC RX 258: Performed by: INTERNAL MEDICINE

## 2022-08-16 PROCEDURE — 97535 SELF CARE MNGMENT TRAINING: CPT

## 2022-08-16 PROCEDURE — 97530 THERAPEUTIC ACTIVITIES: CPT

## 2022-08-16 PROCEDURE — 85014 HEMATOCRIT: CPT

## 2022-08-16 PROCEDURE — 80048 BASIC METABOLIC PNL TOTAL CA: CPT

## 2022-08-16 RX ORDER — SODIUM CHLORIDE 9 MG/ML
INJECTION, SOLUTION INTRAVENOUS CONTINUOUS
Status: DISCONTINUED | OUTPATIENT
Start: 2022-08-16 | End: 2022-08-17

## 2022-08-16 RX ADMIN — SENNOSIDES AND DOCUSATE SODIUM 1 TABLET: 50; 8.6 TABLET ORAL at 08:17

## 2022-08-16 RX ADMIN — ALPRAZOLAM 0.25 MG: 0.25 TABLET ORAL at 00:35

## 2022-08-16 RX ADMIN — OXYCODONE 10 MG: 5 TABLET ORAL at 21:23

## 2022-08-16 RX ADMIN — SENNOSIDES AND DOCUSATE SODIUM 1 TABLET: 50; 8.6 TABLET ORAL at 21:20

## 2022-08-16 RX ADMIN — CYCLOBENZAPRINE 10 MG: 10 TABLET, FILM COATED ORAL at 21:23

## 2022-08-16 RX ADMIN — FAMOTIDINE 20 MG: 20 TABLET ORAL at 08:17

## 2022-08-16 RX ADMIN — AMLODIPINE BESYLATE 2.5 MG: 2.5 TABLET ORAL at 08:19

## 2022-08-16 RX ADMIN — SODIUM CHLORIDE: 9 INJECTION, SOLUTION INTRAVENOUS at 17:59

## 2022-08-16 RX ADMIN — OXYCODONE 10 MG: 5 TABLET ORAL at 08:17

## 2022-08-16 RX ADMIN — OXYCODONE 5 MG: 5 TABLET ORAL at 00:35

## 2022-08-16 RX ADMIN — SODIUM CHLORIDE: 9 INJECTION, SOLUTION INTRAVENOUS at 18:03

## 2022-08-16 RX ADMIN — ALPRAZOLAM 0.25 MG: 0.25 TABLET ORAL at 21:23

## 2022-08-16 RX ADMIN — POLYETHYLENE GLYCOL 3350 17 G: 17 POWDER, FOR SOLUTION ORAL at 08:17

## 2022-08-16 RX ADMIN — ALPRAZOLAM 0.25 MG: 0.25 TABLET ORAL at 12:05

## 2022-08-16 RX ADMIN — BISACODYL 5 MG: 5 TABLET, COATED ORAL at 08:18

## 2022-08-16 RX ADMIN — CYCLOBENZAPRINE 10 MG: 10 TABLET, FILM COATED ORAL at 12:05

## 2022-08-16 RX ADMIN — DEXAMETHASONE SODIUM PHOSPHATE 6 MG: 4 INJECTION, SOLUTION INTRA-ARTICULAR; INTRALESIONAL; INTRAMUSCULAR; INTRAVENOUS; SOFT TISSUE at 00:32

## 2022-08-16 RX ADMIN — SODIUM CHLORIDE, PRESERVATIVE FREE 10 ML: 5 INJECTION INTRAVENOUS at 08:19

## 2022-08-16 ASSESSMENT — PAIN DESCRIPTION - ORIENTATION
ORIENTATION: LOWER;MID
ORIENTATION: MID;LOWER
ORIENTATION: POSTERIOR

## 2022-08-16 ASSESSMENT — PAIN DESCRIPTION - FREQUENCY: FREQUENCY: CONTINUOUS

## 2022-08-16 ASSESSMENT — PAIN DESCRIPTION - DESCRIPTORS
DESCRIPTORS: ACHING
DESCRIPTORS: ACHING
DESCRIPTORS: ACHING;SORE

## 2022-08-16 ASSESSMENT — PAIN SCALES - GENERAL
PAINLEVEL_OUTOF10: 7
PAINLEVEL_OUTOF10: 9
PAINLEVEL_OUTOF10: 6
PAINLEVEL_OUTOF10: 8
PAINLEVEL_OUTOF10: 1

## 2022-08-16 ASSESSMENT — PAIN DESCRIPTION - LOCATION
LOCATION: BACK
LOCATION: BACK
LOCATION: BACK;HEAD

## 2022-08-16 ASSESSMENT — PAIN DESCRIPTION - PAIN TYPE: TYPE: ACUTE PAIN;SURGICAL PAIN;CHRONIC PAIN

## 2022-08-16 ASSESSMENT — PAIN - FUNCTIONAL ASSESSMENT
PAIN_FUNCTIONAL_ASSESSMENT: ACTIVITIES ARE NOT PREVENTED
PAIN_FUNCTIONAL_ASSESSMENT: PREVENTS OR INTERFERES SOME ACTIVE ACTIVITIES AND ADLS
PAIN_FUNCTIONAL_ASSESSMENT: ACTIVITIES ARE NOT PREVENTED

## 2022-08-16 ASSESSMENT — PAIN DESCRIPTION - ONSET: ONSET: ON-GOING

## 2022-08-16 NOTE — PROGRESS NOTES
INTERNAL MEDICINE Progress Note  8/15/2022 8:20 PM  Subjective:   Admit Date: 2022  PCP: Adalgisa Payne MD  Interval History:   reports HA,   no legs weakness    Objective:   Vitals: BP (!) 146/93   Pulse (!) 112   Temp 97.9 °F (36.6 °C)   Resp 18   Ht 5' 2\" (1.575 m)   Wt 162 lb 9.6 oz (73.8 kg)   SpO2 97%   BMI 29.74 kg/m²   General appearance: alert and cooperative with exam  HEENT: Head: Normocephalic,   Neck: no adenopathy, no carotid bruit, no JVD,   Lungs: clear to auscultation bilaterally  Heart: regular rate and rhythm, S1, S2 normal, no murmur, click, rub or gallop  Abdomen: soft, non-tender; bowel sounds normal; no masses,  no organomegaly  Extremities: no cyanosis or edema  Neurologic: Alert, oriented, thought content appropriate      Medications:   Scheduled Meds:   dexamethasone  6 mg IntraVENous Q8H    famotidine  20 mg Oral Daily    sodium chloride flush  5-40 mL IntraVENous 2 times per day    polyethylene glycol  17 g Oral Daily    bisacodyl  5 mg Oral Daily    sennosides-docusate sodium  1 tablet Oral BID    amLODIPine  2.5 mg Oral Daily     Continuous Infusions:   sodium chloride         Lab Results:   CBC:   Recent Labs     22  0635 22  0756 08/15/22  0550   HGB 12.5 11.7* 11.0*       BMP:    Recent Labs     22  0635 22  0756 08/15/22  0550    134* 137   K 3.7 5.0 5.1    101 99   CO2 20* 20* 30   BUN 8 7 8   CREATININE 0.4 0.3* 0.4   GLUCOSE 112* 99 123*           Assessment and Plan:   L1-S1 degenerative disc disease, facet arthropathy, disc protrusions with stenosis and neurogenic claudication. S/p L1-5 Decompression L1-5 PSF  HTN  HA-spinal    Plan:  Cont IVF NS  Continue with amlodipine. Bowel regimen  SCDs.         Krishan De La Cruz MD, MD

## 2022-08-16 NOTE — PROGRESS NOTES
Department of Orthopedic Surgery  Spine Service  Attending Progress Note        Subjective:  POD#4 patient doing okay, lying flat with mild frontal lobe HA. She states she usually drinks coffee daily and has not had any while inpatient. Pt has not had HA into posterior neck/head x 2 days. Ambulates daily with PT with no HA. Voiding okay. Left anterior thigh pain improved with decadron, pt states it is intermittent. No BM. Hgb 11.0. Pt would like to consider ECF at d/c due to lack of mobility/ambulation since surgery, talking with case management today. Vitals  VITALS:  /62   Pulse (!) 118   Temp 98.7 °F (37.1 °C) (Oral)   Resp 18   Ht 5' 2\" (1.575 m)   Wt 162 lb 9.6 oz (73.8 kg)   SpO2 93%   BMI 29.74 kg/m²   24HR INTAKE/OUTPUT:    Intake/Output Summary (Last 24 hours) at 8/16/2022 0803  Last data filed at 8/16/2022 0631  Gross per 24 hour   Intake 520 ml   Output 1705 ml   Net -1185 ml       URINARY CATHETER OUTPUT (Melchor):  [REMOVED] Urinary Catheter Melchor-Output (mL): 300 mL  DRAIN/TUBE OUTPUT:  Closed/Suction Drain Inferior;Right Back Accordion-Output (ml): 130 ml  Closed/Suction Drain Inferior; Left Back Accordion-Output (ml): 0 ml      PHYSICAL EXAM:    Orientation:  alert and oriented to person, place and time    Incision:  dressing in place, clean, dry, intact      Lower Extremity Motor :  quadriceps, extensor hallucis longus, dorsiflexion, plantarflexion 5/5 bilaterally  Lower Extremity Sensory:  Intact L1-S1    Flatus:  positive    LABS:    HgB:    Lab Results   Component Value Date/Time    HGB 11.0 08/16/2022 04:20 AM     Hemoglobin/Hematocrit:    Lab Results   Component Value Date/Time    HGB 11.0 08/16/2022 04:20 AM    HCT 32.8 08/16/2022 04:20 AM     BMP:    Lab Results   Component Value Date/Time     08/16/2022 04:20 AM    K 4.2 08/16/2022 04:20 AM     08/16/2022 04:20 AM    CO2 24 08/16/2022 04:20 AM    BUN 9 08/16/2022 04:20 AM    LABALBU 4.4 09/02/2021 11:10 AM    JESUS

## 2022-08-16 NOTE — PROGRESS NOTES
[facility]  Occupational Therapy  Daily Note  Time:   Time In: 4955  Time Out: 0920  Timed Code Treatment Minutes: 28 Minutes  Minutes: 28          Date: 2022  Patient Name: Svetlana Barber,   Gender: female      Room: [facility]/019-A  MRN: [id number]  : 1950  (67 y.o.)  Referring Practitioner: SAVANNA Guerrero  Diagnosis: Lumbar Stenosis with neurogenic claudication  Additional Pertinent Hx: This is a 67 y.o. female with refractory back and left leg pain from degenerative disc disease and lumbar stenosis from L1-5. Patient has failed full conservative therapy including medication management, physical therapy, and epidural steroid injections. Due to the persistence of symptoms and reduction in the ADLs, patient elected surgical treatment. Patient, therefore, understood indications for the surgery as well as its risks, benefits, and alternatives. These risks include but are not limited to paralysis, infection, hematoma, dural tear, nerve root injury, nonunion, DVT/PE, stroke, MI, death etc.  All questions were answered. Informed consent was obtained. Pt s/p L1-5 bilateral laminectomy, partial medial facetectomies and foraminotomies of L1, L2, L3, L4 and L5 nerve roots, L1-5 posterior spinal fusion,  L1-5 posterior spinal instrumentation, Streamline, SurgAlign instrumentation, Use of local autograft bone and crushed cancellous chips    Restrictions/Precautions:  Restrictions/Precautions: Fall Risk, General Precautions  Required Braces or Orthoses  Spinal: Lumbar Corset  Position Activity Restriction  Spinal Precautions: No Bending, No Lifting, No Twisting  Other position/activity restrictions: Lumbar Corset when up     SUBJECTIVE: RN okayed OT treatment. Pt. In room agreeable to participate in OT session. Upon entering room Pt. Requesting to use the bathroom Pt. Incontinent of stool. End of session Pt. Reported a headache (noted below) nausea and chills. Once Pt. Laid down supine in bed symptoms began to relieve slightly, RN notified and aware. PAIN: 9/10: At end of session Pt. With report of a headache front of forehead and back of neck RN notified and Pt. Laid down flat in bed. Vitals: Vitals not assessed per clinical judgement, see nursing flowsheet    COGNITION: WFL    ADL:   Upper Extremity Dressing:Min A to don corset and to doff  Toileting: Dependent. For thorough michael care Pt. Attempted prior with difficulty reaching x 2 episodes  Toilet Transfer: [employer] and with verbal cues . [place] BALANCE:  Sitting Balance:  Stand By Assistance. Standing Balance: Contact Guard Assistance. BED MOBILITY:  Supine to Sit: Stand By Assistance, with verbal cues     Sit to Supine: Moderate Assistance      TRANSFERS:  Sit to Stand:  Contact Guard Assistance. Stand to Sit: Contact Guard Assistance. FUNCTIONAL MOBILITY:  Assistive Device: Rolling Walker  Assist Level:  Contact Guard Assistance. Distance: To and from bathroom  X 2 trials no LOB         ASSESSMENT:     Activity Tolerance:  Patient tolerance of  treatment: fair. Discharge Recommendations: Continue to assess pending progress and Patient would benefit from continued OT at discharge  Equipment Recommendations: Equipment Needed: No  Plan: Times per Week: 5x  Times per Day: Daily  Current Treatment Recommendations: Strengthening, Balance training, Functional mobility training, Self-Care / ADL, Safety education & training    Patient Education  Patient Education: ADL's, Precautions, and Assistive Device Safety and safety with functional mobility and transfers.      Goals  Short Term Goals  Time Frame for Short term goals: by discharge  Short Term Goal 1: Pt will complete toileting routine, including t/fs, with no > than Min A and min VC for back precautions to increase indep with self care  Short Term Goal 2: Pt will tolerate dynamic standing x5-6min with SBA and B hand release to increase indep with grooming  Short Term Goal 3: Pt will safely navigate to/from bathroom with SBA and no LOB to increase indep with ADLs  Short Term Goal 4: Pt will complete LB ADLs with LHAE prn, Min A, and no VC for back precautions to increase indep with dressing    Following session, patient left in safe position with all fall risk precautions in place.

## 2022-08-16 NOTE — PROGRESS NOTES
Comprehensive Nutrition Assessment    Type and Reason for Visit:  Initial, Consult (oral nutrition supplements, poor appetite since admission, weight loss PTA)    Nutrition Recommendations/Plan:   ONS: Ensure High Protein BID  Nutrition Ileus Prevention Protocol: Activia at breakfast daily and Hot Beverage TID  Diet as ordered  Consider MVI     Malnutrition Assessment:  Malnutrition Status: At risk for malnutrition (Comment) (08/16/22 1142)    Context:  Chronic Illness     Findings of the 6 clinical characteristics of malnutrition:  Energy Intake:  75% or less estimated energy requirements for 1 month or longer  Weight Loss:  Mild weight loss (specify amount and time period) (10% loss over 11 months)     Body Fat Loss:  No significant body fat loss     Muscle Mass Loss:  No significant muscle mass loss    Fluid Accumulation:  Unable to assess     Strength:  Not Performed    Nutrition Assessment:     Pt. nutritionally compromised AEB decreased appetite/ intake for the 5 months and mild weight loss. At risk for further nutrition compromise r/t admit with spinal stenosis of lumbar region, increased nutrient needs to aid in surgical healing, s/p lumbar surgery 8/12/22 and underlying medical condition (COPD, HTN).        Nutrition Related Findings:    Pt. Report/Treatments/Miscellaneous: Patient reports typically eats late breakfast, snacks in the afternoon and good amount for dinner and does own cooking PTA, reports intake ~75% of typical and weight loss since March 2022 due to pain and note report on admission of constant low back pain with plan for surgery, reports near all of oatmeal for today breakfast, drank all of Ensure high protein shake yesterday and agreeable to continue use and explained nutrition ileus prevention protocol   GI Status: reports had first BM today since having surgery 8/12  Pertinent Labs: Glucose 141, BUN 9, Creatinine 0.3  Pertinent Meds: dulcolax, glycolax, senokot     Wound Type: Surgical Incision (8/12/22: L1-5 Decompression L1-5 Posterior Fusion)       Current Nutrition Intake & Therapies:    Average Meal Intake: 1-25%, 51-75%, %  Average Supplements Intake: % (Ensure High Protein)  ADULT DIET; Regular  ADULT ORAL NUTRITION SUPPLEMENT; Breakfast, Dinner; Low Calorie/High Protein Oral Supplement  ADULT ORAL NUTRITION SUPPLEMENT; Breakfast, Lunch, Dinner; Other Oral Supplement; Activia Yogurt at breakfast daily and Hot Beverage TID    Anthropometric Measures:  Height: 5' 2\" (157.5 cm)  Ideal Body Weight (IBW): 110 lbs (50 kg)    Admission Body Weight: 162 lb 9.6 oz (73.8 kg) (8/12; no edema)  Current Body Weight: 162 lb 9.6 oz (73.8 kg),     Current BMI (kg/m2): 29.7  Usual Body Weight:  (reports weight August 2021: 180# and reports weight loss since March 2022 due to pain; per EMR: 9-1-21 181#, 7-21-22 168# 7oz)                       BMI Categories: Overweight (BMI 25.0-29. 9)    Estimated Daily Nutrient Needs:  Energy Requirements Based On: Kcal/kg  Weight Used for Energy Requirements: Admission (74kgm on 8/12)  Energy (kcal/day): 6846-4762 kcals (20-25)  Weight Used for Protein Requirements: Ideal (50kgm)  Protein (g/day):  grams (1.2-2)       Nutrition Diagnosis:   Inadequate oral intake related to inadequate protein-energy intake, pain as evidenced by poor intake prior to admission    Nutrition Interventions:   Food and/or Nutrient Delivery: Continue Current Diet, Continue Oral Nutrition Supplement  Nutrition Education/Counseling: Education initiated (encouraged intake at best efforts and use of ONS)  Coordination of Nutrition Care: Continue to monitor while inpatient       Goals:     Goals: PO intake 75% or greater, by next RD assessment       Nutrition Monitoring and Evaluation:      Food/Nutrient Intake Outcomes: Food and Nutrient Intake, Supplement Intake  Physical Signs/Symptoms Outcomes: Biochemical Data, Meal Time Behavior, Nutrition Focused Physical Findings,

## 2022-08-16 NOTE — PROGRESS NOTES
[address] [address]  INPATIENT PHYSICAL THERAPY  DAILY NOTE  [facility] ORTHOPEDICS 7K - [id number]-[id number]    Time In: 9288  Time Out: 1331  Timed Code Treatment Minutes: 29 Minutes  Minutes: 29          Date: 2022  Patient Name: Alcides Amaya,  Gender:  female        MRN: [id number]  : 1950  (67 y.o.)     Referring Practitioner: SAVANNA Pritchett  Diagnosis: Spinal stenosis of lumbar region, unspecified whether neurogenic claudication present  Additional Pertinent Hx: The patient is a 68-year-old female with complaints of constant low back pain that radiates pain into the left groin and left anterior thigh and down the left lateral leg to the ankle. Symptoms have been present on a chronic basis and worsening. Current VAS score 8/10. Percentage wise, 50% pain in the back and 50% left leg. Activities that aggravate the pain include standing. Rest helps relieve the pain. Modifying factors include medication management, PT and MARIA DEL CARMEN with really no relief. No bowel or bladder changes. Former smoker. She had a prior back surgery in .  L1-5 Decompression L1-5 Posterior Fusion    on  with dr sanchez     Prior Level of Function:  Lives With: Alone  Type of Home: Trailer  Home Layout: One level  Home Access: Ramped entrance, Stairs to enter with rails  Entrance Stairs - Number of Steps: 4  Entrance Stairs - Rails: Both  Home Equipment: Walker, rolling, Sock aid, Grab bars, Long-handled shoehorn, Reacher   Bathroom Shower/Tub: Walk-in shower  Bathroom Toilet: Handicap height  Bathroom Equipment: Built-in shower seat, Grab bars in shower, Grab bars around toilet, 3-in-1 commode  Bathroom Accessibility: Accessible    ADL Assistance: [address] [place] [address] Avenue: Independent  Homemaking Responsibilities: Yes  Ambulation Assistance: Independent  Transfer Assistance: Independent  Active : Yes    Restrictions/Precautions:  Restrictions/Precautions: Fall Risk, General Precautions  Required Braces or Orthoses  Spinal: Lumbar Corset  Position Activity Restriction  Spinal Precautions: No Bending, No Lifting, No Twisting  Other position/activity restrictions: Lumbar Corset when up     SUBJECTIVE: RN approved session. Patient in bed at arrival and agreeable to therapy. Patient requested to use restroom at beginning of session in order to complete various ADLs. Patient denied headache or dizziness throughout session. PAIN: Yes, soreness in low back. Vitals: Vitals not assessed per clinical judgement, see nursing flowsheet    OBJECTIVE:  Bed Mobility:  Rolling to Right: Stand By Assistance, with head of bed flat, without rail   Supine to Sit: Stand By Assistance, with head of bed flat, without rail  Sit to Supine: Stand By Assistance, with head of bed flat, without rail     Transfers:  Sit to Stand: Stand By Assistance, cues for hand placement  Stand to Sit:Stand By Assistance, cues for hand placement    Ambulation:  Stand By Assistance, [address], with increased time for completion  Distance: 65'x1, 100'x1   Surface: Level Tile  Device:Rolling Walker  Gait Deviations:  Slow [place], Decreased Step Length Bilaterally, Decreased Gait Speed, Decreased Heel Strike Bilaterally, Mild Path Deviations, and Decreased Terminal Knee Extension    Stairs:  Contact Guard Assistance  Number of Steps: 4  Height: 6\" step with Bilateral Handrails  *Patient completed with step to pattern for safety without being cued. Good stability noted with no LOB. Assistance needed to manage drains. Balance:  Dynamic Standing Balance: Stand By Assistance, Patient complete various tasks at bathroom sink. Good stability noted with unilateral to no UE support. Exercise:  None Completed. Functional Outcome Measures: Completed  AM-PAC Inpatient Mobility Raw Score : 18  AM-PAC Inpatient T-Scale Score : 43.63    ASSESSMENT:  Assessment: Patient progressing toward established goals.   Activity Tolerance:  Patient tolerance of  treatment: good. Equipment Recommendations:Equipment Needed: No  Discharge Recommendations: Continue to assess pending progress, Home with Assist as Needed, Home with Home Health PT, and Patient would benefit from continued PT at discharge  Plan: Current Treatment Recommendations: Strengthening, Balance training, Gait training, Stair training, Functional mobility training  Plan:  (6x O)    Patient Education  Patient Education: Plan of Care, Bed Mobility, Transfers, Gait, Stairs    Goals:  Patient goals : none stated  Short Term Goals  Time Frame for Short term goals: by discharge  Short term goal 1: bed mobility with HOB flat, no rails, mod I for increased functional ind  Short term goal 2: sit<>stand from various surfaces with LRD mod I for safe transfers  Short term goal 3: ambulate 200' with LRD mod I for safe household distances  Short term goal 4: navigate 3 steps with LRD mod I for safe enter/exit of home  Long Term Goals  Time Frame for Long term goals : NA d/t short ELOS    Following session, patient left in safe position with all fall risk precautions in place.

## 2022-08-16 NOTE — CARE COORDINATION
8/16/22, 12:26 PM EDT    DISCHARGE ON [address] [facility] day: 4  Location: [facility]19/019-A Reason for admit: Spinal stenosis of lumbar region, unspecified whether neurogenic claudication present [M48.061]  Lumbar stenosis with neurogenic claudication [M48.062]   Procedure: 8/12  L1-S1 degenerative disc disease, facet arthropathy, disc protrusions with stenosis and neurogenic claudication  Barriers to Discharge: walked in hallway yesterday 8/15, developed occipital H/A with therapy, on bedrest.      Patient Goals/Plan/Treatment Preferences: from home; [facility] accepted pt. for drain mgmt. 46- called daughter Tim Evans; left message on her vmail to return message. C/ [facility] [address] [place] [address] called back re: discharge plan. Explained pt walked in hallway yesterday. Will not qualify for IPR.

## 2022-08-16 NOTE — PLAN OF CARE
Incisions, wounds, or drain sites healing without S/S of infection  Outcome: Progressing  Flowsheets (Taken 8/16/2022 0149)  Incisions, Wounds, or Drain Sites Healing Without Sign and Symptoms of Infection:   TWICE DAILY: Assess and document skin integrity   TWICE DAILY: Assess and document dressing/incision, wound bed, drain sites and surrounding tissue   Implement wound care per orders     Problem: Hematologic - Adult  Goal: Maintains hematologic stability  Outcome: Progressing  Flowsheets (Taken 8/14/2022 1550 by Rhonda Arthur RN)  Maintains hematologic stability:   Assess for signs and symptoms of bleeding or hemorrhage   Monitor labs for bleeding or clotting disorders   Care plan reviewed with patient. Patient verbalize understanding of the plan of care and contribute to goal setting.

## 2022-08-16 NOTE — PLAN OF CARE
Problem: Discharge Planning  Goal: Discharge to home or other facility with appropriate resources  8/16/2022 1027 by Saba Mathews RN  Outcome: Progressing  Flowsheets (Taken 8/16/2022 1027)  Discharge to home or other facility with appropriate resources:   Identify barriers to discharge with patient and caregiver   Arrange for needed discharge resources and transportation as appropriate   Identify discharge learning needs (meds, wound care, etc)   Refer to discharge planning if patient needs post-hospital services based on physician order or complex needs related to functional status, cognitive ability or social support system  Note: Is ambulating well 400 feet. Needs lots of encouragement. Still having headaches off and on     Problem: Pain  Goal: Verbalizes/displays adequate comfort level or baseline comfort level  8/16/2022 1027 by Saba Mathews RN  Outcome: Progressing  Flowsheets (Taken 8/16/2022 1027)  Verbalizes/displays adequate comfort level or baseline comfort level:   Encourage patient to monitor pain and request assistance   Assess pain using appropriate pain scale   Administer analgesics based on type and severity of pain and evaluate response   Implement non-pharmacological measures as appropriate and evaluate response   Notify Licensed Independent Practitioner if interventions unsuccessful or patient reports new pain     Problem: ABCDS Injury Assessment  Goal: Absence of physical injury  8/16/2022 1027 by Saba Mathews RN  Outcome: Progressing  Note: Pt using call light appropriately to call for assistance with ambulation to the bathroom and to chair. Pt is also compliant with use of non-skid slippers. Pt reports understanding of fall prevention when discussed.       Problem: Musculoskeletal - Adult  Goal: Return mobility to safest level of function  8/16/2022 1027 by Saba Mathews RN  Outcome: Progressing  Flowsheets (Taken 8/16/2022 1027)  Return Mobility to Safest Level of Function:   Assess patient stability and activity tolerance for standing, transferring and ambulating with or without assistive devices   Assist with transfers and ambulation using safe patient handling equipment as needed   Ensure adequate protection for wounds/incisions during mobilization   Obtain physical therapy/occupational therapy consults as needed   Instruct patient/family in ordered activity level     Problem: Musculoskeletal - Adult  Goal: Maintain proper alignment of affected body part  8/16/2022 1027 by Emmie Estevez RN  Outcome: Progressing     Problem: Gastrointestinal - Adult  Goal: Maintains or returns to baseline bowel function  8/16/2022 1027 by Emmie Estevez RN  Outcome: Progressing  Flowsheets (Taken 8/16/2022 1027)  Maintains or returns to baseline bowel function:   Assess bowel function   Encourage mobilization and activity   Administer IV fluids as ordered to ensure adequate hydration   Encourage oral fluids to ensure adequate hydration   Administer ordered medications as needed   Nutrition consult to assist patient with appropriate food choices  Note: Had a BM today     Problem: Genitourinary - Adult  Goal: Absence of urinary retention  8/16/2022 1027 by Emmie Estevez RN  Outcome: Progressing  Flowsheets (Taken 8/16/2022 1027)  Absence of urinary retention:   Assess patients ability to void and empty bladder   Monitor intake/output and perform bladder scan as needed   Discuss with Licensed Independent Practitioner  medications to alleviate retention as needed   Discuss catheterization for long term situations as appropriate  Note: Voiding without difficulty     Problem: Infection - Adult  Goal: Absence of infection during hospitalization  8/16/2022 1027 by Emmie Estevez RN  Outcome: Progressing  Flowsheets (Taken 8/16/2022 1027)  Absence of infection during hospitalization:   Assess and monitor for signs and symptoms of infection   Monitor lab/diagnostic results   Monitor all insertion sites i.e., indwelling lines, tubes and drains   Instruct and encourage patient and family to use good hand hygiene technique   Administer medications as ordered     Problem: Safety - Adult  Goal: Free from fall injury  8/16/2022 1027 by Cristofer Sheridan RN  Outcome: Progressing  Note: Pt using call light appropriately to call for assistance with ambulation to the bathroom and to chair. Pt is also compliant with use of non-skid slippers. Pt reports understanding of fall prevention when discussed. Problem: Skin/Tissue Integrity - Adult  Goal: Incisions, wounds, or drain sites healing without S/S of infection  8/16/2022 1027 by Cristofer Sheridan RN  Outcome: Progressing  Flowsheets  Taken 8/16/2022 1027  Incisions, Wounds, or Drain Sites Healing Without Sign and Symptoms of Infection: TWICE DAILY: Assess and document dressing/incision, wound bed, drain sites and surrounding tissue  Taken 8/16/2022 1025  Incisions, Wounds, or Drain Sites Healing Without Sign and Symptoms of Infection:   TWICE DAILY: Assess and document skin integrity   TWICE DAILY: Assess and document dressing/incision, wound bed, drain sites and surrounding tissue     Problem: Hematologic - Adult  Goal: Maintains hematologic stability  8/16/2022 1027 by Cristofer Sheridan RN  Outcome: Progressing  Flowsheets (Taken 8/16/2022 1027)  Maintains hematologic stability: Assess for signs and symptoms of bleeding or hemorrhage   Care plan reviewed with patient . Patient verbalize understanding of the plan of care and contribute to goal setting.

## 2022-08-16 NOTE — PROGRESS NOTES
INTERNAL MEDICINE Progress Note  8/16/2022 11:06 AM  Subjective:   Admit Date: 8/12/2022  PCP: Joe Ptael MD  Interval History:   reports HA, no N/V  no legs weakness    Objective:   Vitals: /80   Pulse (!) 106   Temp 98.1 °F (36.7 °C) (Oral)   Resp 18   Ht 5' 2\" (1.575 m)   Wt 162 lb 9.6 oz (73.8 kg)   SpO2 96%   BMI 29.74 kg/m²   General appearance: alert and cooperative with exam  HEENT: Head: Normocephalic,   Neck: no adenopathy, no carotid bruit, no JVD,   Lungs: clear to auscultation bilaterally  Heart: regular rate and rhythm, S1, S2 normal, no murmur, click, rub or gallop  Abdomen: soft, non-tender; bowel sounds normal; no masses,  no organomegaly  Extremities: no cyanosis or edema  Neurologic: Alert, oriented, thought content appropriate      Medications:   Scheduled Meds:   famotidine  20 mg Oral Daily    sodium chloride flush  5-40 mL IntraVENous 2 times per day    polyethylene glycol  17 g Oral Daily    bisacodyl  5 mg Oral Daily    sennosides-docusate sodium  1 tablet Oral BID    amLODIPine  2.5 mg Oral Daily     Continuous Infusions:   sodium chloride         Lab Results:   CBC:   Recent Labs     08/14/22  0756 08/15/22  0550 08/16/22  0420   HGB 11.7* 11.0* 11.0*       BMP:    Recent Labs     08/14/22  0756 08/15/22  0550 08/16/22  0420   * 137 138   K 5.0 5.1 4.2    99 104   CO2 20* 30 24   BUN 7 8 9   CREATININE 0.3* 0.4 0.3*   GLUCOSE 99 123* 141*           Assessment and Plan:   L1-S1 degenerative disc disease, facet arthropathy, disc protrusions with stenosis and neurogenic claudication. S/p L1-5 Decompression L1-5 PSF  HTN  HA-spinal    Plan:  Cont IV NS  Continue with amlodipine. Bowel regimen  SCDs.         Emperatriz Wilson MD, MD

## 2022-08-17 LAB
ANION GAP SERPL CALCULATED.3IONS-SCNC: 11 MEQ/L (ref 8–16)
BUN BLDV-MCNC: 11 MG/DL (ref 7–22)
CALCIUM SERPL-MCNC: 8.2 MG/DL (ref 8.5–10.5)
CHLORIDE BLD-SCNC: 106 MEQ/L (ref 98–111)
CO2: 21 MEQ/L (ref 23–33)
CREAT SERPL-MCNC: 0.4 MG/DL (ref 0.4–1.2)
GFR SERPL CREATININE-BSD FRML MDRD: > 90 ML/MIN/1.73M2
GLUCOSE BLD-MCNC: 99 MG/DL (ref 70–108)
HCT VFR BLD CALC: 35.9 % (ref 37–47)
HEMOGLOBIN: 11.3 GM/DL (ref 12–16)
POTASSIUM SERPL-SCNC: 3.8 MEQ/L (ref 3.5–5.2)
SODIUM BLD-SCNC: 138 MEQ/L (ref 135–145)

## 2022-08-17 PROCEDURE — 36415 COLL VENOUS BLD VENIPUNCTURE: CPT

## 2022-08-17 PROCEDURE — 1200000000 HC SEMI PRIVATE

## 2022-08-17 PROCEDURE — 97530 THERAPEUTIC ACTIVITIES: CPT

## 2022-08-17 PROCEDURE — 2580000003 HC RX 258: Performed by: INTERNAL MEDICINE

## 2022-08-17 PROCEDURE — 6370000000 HC RX 637 (ALT 250 FOR IP): Performed by: PHYSICIAN ASSISTANT

## 2022-08-17 PROCEDURE — 85014 HEMATOCRIT: CPT

## 2022-08-17 PROCEDURE — 97535 SELF CARE MNGMENT TRAINING: CPT

## 2022-08-17 PROCEDURE — 97116 GAIT TRAINING THERAPY: CPT

## 2022-08-17 PROCEDURE — 80048 BASIC METABOLIC PNL TOTAL CA: CPT

## 2022-08-17 PROCEDURE — 85018 HEMOGLOBIN: CPT

## 2022-08-17 PROCEDURE — 2580000003 HC RX 258: Performed by: PHYSICIAN ASSISTANT

## 2022-08-17 RX ADMIN — SENNOSIDES AND DOCUSATE SODIUM 1 TABLET: 50; 8.6 TABLET ORAL at 07:53

## 2022-08-17 RX ADMIN — CYCLOBENZAPRINE 10 MG: 10 TABLET, FILM COATED ORAL at 07:56

## 2022-08-17 RX ADMIN — SODIUM CHLORIDE: 9 INJECTION, SOLUTION INTRAVENOUS at 04:12

## 2022-08-17 RX ADMIN — SODIUM CHLORIDE, PRESERVATIVE FREE 10 ML: 5 INJECTION INTRAVENOUS at 07:54

## 2022-08-17 RX ADMIN — SENNOSIDES AND DOCUSATE SODIUM 1 TABLET: 50; 8.6 TABLET ORAL at 21:28

## 2022-08-17 RX ADMIN — FAMOTIDINE 20 MG: 20 TABLET ORAL at 07:53

## 2022-08-17 RX ADMIN — ALPRAZOLAM 0.25 MG: 0.25 TABLET ORAL at 18:47

## 2022-08-17 RX ADMIN — CYCLOBENZAPRINE 10 MG: 10 TABLET, FILM COATED ORAL at 21:28

## 2022-08-17 RX ADMIN — OXYCODONE 10 MG: 5 TABLET ORAL at 04:35

## 2022-08-17 RX ADMIN — SODIUM CHLORIDE, PRESERVATIVE FREE 10 ML: 5 INJECTION INTRAVENOUS at 21:28

## 2022-08-17 RX ADMIN — OXYCODONE 10 MG: 5 TABLET ORAL at 13:09

## 2022-08-17 RX ADMIN — OXYCODONE 10 MG: 5 TABLET ORAL at 18:47

## 2022-08-17 RX ADMIN — ALPRAZOLAM 0.25 MG: 0.25 TABLET ORAL at 07:56

## 2022-08-17 RX ADMIN — AMLODIPINE BESYLATE 2.5 MG: 2.5 TABLET ORAL at 07:53

## 2022-08-17 RX ADMIN — BISACODYL 5 MG: 5 TABLET, COATED ORAL at 07:53

## 2022-08-17 RX ADMIN — POLYETHYLENE GLYCOL 3350 17 G: 17 POWDER, FOR SOLUTION ORAL at 07:53

## 2022-08-17 ASSESSMENT — PAIN - FUNCTIONAL ASSESSMENT
PAIN_FUNCTIONAL_ASSESSMENT: ACTIVITIES ARE NOT PREVENTED

## 2022-08-17 ASSESSMENT — PAIN SCALES - GENERAL
PAINLEVEL_OUTOF10: 0
PAINLEVEL_OUTOF10: 7
PAINLEVEL_OUTOF10: 10
PAINLEVEL_OUTOF10: 8
PAINLEVEL_OUTOF10: 4
PAINLEVEL_OUTOF10: 8

## 2022-08-17 ASSESSMENT — PAIN DESCRIPTION - PAIN TYPE
TYPE: ACUTE PAIN;CHRONIC PAIN;SURGICAL PAIN
TYPE: ACUTE PAIN;CHRONIC PAIN;SURGICAL PAIN

## 2022-08-17 ASSESSMENT — PAIN DESCRIPTION - FREQUENCY
FREQUENCY: CONTINUOUS
FREQUENCY: CONTINUOUS

## 2022-08-17 ASSESSMENT — PAIN DESCRIPTION - DESCRIPTORS
DESCRIPTORS: ACHING;DISCOMFORT
DESCRIPTORS: SPASM
DESCRIPTORS: ACHING;CRAMPING;DISCOMFORT
DESCRIPTORS: ACHING;DISCOMFORT
DESCRIPTORS: ACHING;DISCOMFORT

## 2022-08-17 ASSESSMENT — PAIN DESCRIPTION - ORIENTATION
ORIENTATION: MID;LOWER

## 2022-08-17 ASSESSMENT — PAIN DESCRIPTION - ONSET
ONSET: ON-GOING
ONSET: ON-GOING

## 2022-08-17 ASSESSMENT — PAIN DESCRIPTION - LOCATION
LOCATION: BACK

## 2022-08-17 NOTE — PROGRESS NOTES
[address] [address]  INPATIENT PHYSICAL THERAPY  DAILY NOTE  [facility] ORTHOPEDICS 7K - [id number]    Time In: 9904  Time Out: 1251  Timed Code Treatment Minutes: 24 Minutes  Minutes: 24          Date: 2022  Patient Name: Mel Sheehan,  Gender:  female        MRN: [id number]  : 1950  (67 y.o.)     Referring Practitioner: SAVANNA Smith  Diagnosis: Spinal stenosis of lumbar region, unspecified whether neurogenic claudication present  Additional Pertinent Hx: The patient is a 59-year-old female with complaints of constant low back pain that radiates pain into the left groin and left anterior thigh and down the left lateral leg to the ankle. Symptoms have been present on a chronic basis and worsening. Current VAS score 8/10. Percentage wise, 50% pain in the back and 50% left leg. Activities that aggravate the pain include standing. Rest helps relieve the pain. Modifying factors include medication management, PT and MARIA DEL CARMEN with really no relief. No bowel or bladder changes. Former smoker. She had a prior back surgery in .  L1-5 Decompression L1-5 Posterior Fusion    on  with dr sanchez     Prior Level of Function:  Lives With: Alone  Type of Home: Trailer  Home Layout: One level  Home Access: Ramped entrance, Stairs to enter with rails  Entrance Stairs - Number of Steps: 4  Entrance Stairs - Rails: Both  Home Equipment: Walker, rolling, Sock aid, Grab bars, Long-handled shoehorn, Reacher   Bathroom Shower/Tub: Walk-in shower  Bathroom Toilet: Handicap height  Bathroom Equipment: Built-in shower seat, Grab bars in shower, Grab bars around toilet, 3-in-1 commode  Bathroom Accessibility: Accessible    ADL Assistance: [address] [place] [address] Avenue: Independent  Homemaking Responsibilities: Yes  Ambulation Assistance: Independent  Transfer Assistance: Independent  Active : Yes    Restrictions/Precautions:  Restrictions/Precautions: Fall Risk, General Precautions  Required Braces or Health PT and Patient would benefit from continued PT at discharge  Plan: Current Treatment Recommendations: Strengthening, Balance training, Gait training, Stair training, Functional mobility training  Plan:  (6x O)    Patient Education  Patient Education: Plan of Care, Precautions/Restrictions, Bed Mobility, Transfers, Gait, Verbal Exercise Instruction    Goals:  Patient goals : none stated  Short Term Goals  Time Frame for Short term goals: by discharge  Short term goal 1: bed mobility with HOB flat, no rails, mod I for increased functional ind  Short term goal 2: sit<>stand from various surfaces with LRD mod I for safe transfers  Short term goal 3: ambulate 200' with LRD mod I for safe household distances  Short term goal 4: navigate 3 steps with LRD mod I for safe enter/exit of home  Long Term Goals  Time Frame for Long term goals : NA d/t short ELOS    Following session, patient left in safe position with all fall risk precautions in place.

## 2022-08-17 NOTE — PROGRESS NOTES
Activity Level:  As tolerated. PT/OT, back brace when ambulating. 3:  Pain Control:  Good  4:  Discharge Planning:  Pending      Zahida Sanchez.  HAN Emery

## 2022-08-17 NOTE — PROGRESS NOTES
[address] [facility]  Occupational Therapy  Daily Note  Time:   Time In: 1025  Time Out: 1055  Timed Code Treatment Minutes: 30 Minutes  Minutes: 30          Date: 2022  Patient Name: Andriy Melissa,   Gender: female      Room: [facility]/019-A  MRN: [id number]  : 1950  (67 y.o.)  Referring Practitioner: SAVANNA George  Diagnosis: Lumbar Stenosis with neurogenic claudication  Additional Pertinent Hx: This is a 67 y.o. female with refractory back and left leg pain from degenerative disc disease and lumbar stenosis from L1-5. Patient has failed full conservative therapy including medication management, physical therapy, and epidural steroid injections. Due to the persistence of symptoms and reduction in the ADLs, patient elected surgical treatment. Patient, therefore, understood indications for the surgery as well as its risks, benefits, and alternatives. These risks include but are not limited to paralysis, infection, hematoma, dural tear, nerve root injury, nonunion, DVT/PE, stroke, MI, death etc.  All questions were answered. Informed consent was obtained. Pt s/p L1-5 bilateral laminectomy, partial medial facetectomies and foraminotomies of L1, L2, L3, L4 and L5 nerve roots, L1-5 posterior spinal fusion,  L1-5 posterior spinal instrumentation, Streamline, SurgAlign instrumentation, Use of local autograft bone and crushed cancellous chips    Restrictions/Precautions:  Restrictions/Precautions: Fall Risk, General Precautions  Required Braces or Orthoses  Spinal: Lumbar Corset  Position Activity Restriction  Spinal Precautions: No Bending, No Lifting, No Twisting  Other position/activity restrictions: Lumbar Corset when up     SUBJECTIVE: Pleasant and cooperative  RN approved session. Pt C/O having L thigh strange sensations. She did not request a pain med or ice pack at this time.     PAIN: 8/10: Pt reported it is mostly in her lower back    Vitals: Vitals not assessed per routine, including t/fs, with no > than Min A and min VC for back precautions to increase indep with self care  Short Term Goal 2: Pt will tolerate dynamic standing x5-6min with SBA and B hand release to increase indep with grooming  Short Term Goal 3: Pt will safely navigate to/from bathroom with SBA and no LOB to increase indep with ADLs  Short Term Goal 4: Pt will complete LB ADLs with LHAE prn, Min A, and no VC for back precautions to increase indep with dressing    Following session, patient left in safe position with all fall risk precautions in place.

## 2022-08-17 NOTE — PLAN OF CARE
Problem: Discharge Planning  Goal: Discharge to home or other facility with appropriate resources  Outcome: Progressing  Flowsheets (Taken 8/16/2022 1027 by Cristiane Bennett, RN)  Discharge to home or other facility with appropriate resources:   Identify barriers to discharge with patient and caregiver   Arrange for needed discharge resources and transportation as appropriate   Identify discharge learning needs (meds, wound care, etc)   Refer to discharge planning if patient needs post-hospital services based on physician order or complex needs related to functional status, cognitive ability or social support system     Problem: Pain  Goal: Verbalizes/displays adequate comfort level or baseline comfort level  Outcome: Progressing  Flowsheets (Taken 8/16/2022 1027 by Cristiane Bennett, RN)  Verbalizes/displays adequate comfort level or baseline comfort level:   Encourage patient to monitor pain and request assistance   Assess pain using appropriate pain scale   Administer analgesics based on type and severity of pain and evaluate response   Implement non-pharmacological measures as appropriate and evaluate response   Notify Licensed Independent Practitioner if interventions unsuccessful or patient reports new pain     Problem: ABCDS Injury Assessment  Goal: Absence of physical injury  Outcome: Progressing  Flowsheets (Taken 8/17/2022 0611)  Absence of Physical Injury: Implement safety measures based on patient assessment     Problem: Musculoskeletal - Adult  Goal: Return mobility to safest level of function  Outcome: Progressing  Flowsheets (Taken 8/16/2022 1027 by Cristiane Bennett RN)  Return Mobility to Safest Level of Function:   Assess patient stability and activity tolerance for standing, transferring and ambulating with or without assistive devices   Assist with transfers and ambulation using safe patient handling equipment as needed   Ensure adequate protection for wounds/incisions during mobilization   Obtain physical therapy/occupational therapy consults as needed   Instruct patient/family in ordered activity level     Problem: Musculoskeletal - Adult  Goal: Maintain proper alignment of affected body part  Outcome: Progressing  Flowsheets (Taken 8/14/2022 1550 by Lisa Alvarado RN)  Maintain proper alignment of affected body part:   Support and protect limb and body alignment per provider's orders   Instruct and reinforce with patient and family use of appropriate assistive device and precautions (e.g. spinal or hip dislocation precautions)     Problem: Gastrointestinal - Adult  Goal: Maintains or returns to baseline bowel function  Outcome: Progressing  Flowsheets (Taken 8/16/2022 2111)  Maintains or returns to baseline bowel function:   Assess bowel function   Encourage oral fluids to ensure adequate hydration   Administer IV fluids as ordered to ensure adequate hydration   Administer ordered medications as needed   Nutrition consult to assist patient with appropriate food choices     Problem: Genitourinary - Adult  Goal: Absence of urinary retention  Outcome: Progressing  Flowsheets (Taken 8/16/2022 2111)  Absence of urinary retention:   Assess patients ability to void and empty bladder   Monitor intake/output and perform bladder scan as needed   Discuss with Licensed Independent Practitioner  medications to alleviate retention as needed     Problem: Infection - Adult  Goal: Absence of infection during hospitalization  Outcome: Progressing  Flowsheets (Taken 8/16/2022 1027 by Cristiane Bennett RN)  Absence of infection during hospitalization:   Assess and monitor for signs and symptoms of infection   Monitor lab/diagnostic results   Monitor all insertion sites i.e., indwelling lines, tubes and drains   Instruct and encourage patient and family to use good hand hygiene technique   Administer medications as ordered     Problem: Safety - Adult  Goal: Free from fall injury  Outcome: Progressing  Flowsheets (Taken 8/16/2022 0149 by Ivan Gill)  Free From Fall Injury: Instruct family/caregiver on patient safety     Problem: Skin/Tissue Integrity - Adult  Goal: Incisions, wounds, or drain sites healing without S/S of infection  Outcome: Progressing  Flowsheets (Taken 8/16/2022 1027 by Ivy Kitchen RN)  Incisions, Wounds, or Drain Sites Healing Without Sign and Symptoms of Infection: TWICE DAILY: Assess and document dressing/incision, wound bed, drain sites and surrounding tissue     Problem: Hematologic - Adult  Goal: Maintains hematologic stability  Outcome: Progressing  Flowsheets (Taken 8/16/2022 1027 by Ivy Kitchen RN)  Maintains hematologic stability: Assess for signs and symptoms of bleeding or hemorrhage     Problem: Nutrition Deficit:  Goal: Optimize nutritional status  Outcome: Progressing  Flowsheets (Taken 8/17/2022 0611)  Nutrient intake appropriate for improving, restoring, or maintaining nutritional needs:   Assess nutritional status and recommend course of action   Recommend appropriate diets, oral nutritional supplements, and vitamin/mineral supplements   Order, calculate, and assess calorie counts as needed     Problem: Genitourinary - Adult  Goal: Urinary catheter remains patent  Recent Flowsheet Documentation  Taken 8/16/2022 2111 by Akash Schmitt RN  Urinary catheter remains patent: Assess patency of urinary catheter     Care plan reviewed with patient. Patient verbalizes understanding of plan of care and contributes to goal setting.

## 2022-08-17 NOTE — PROGRESS NOTES
INTERNAL MEDICINE Progress Note  8/17/2022 2:21 PM  Subjective:   Admit Date: 8/12/2022  PCP: Jimmie Corona MD  Interval History:     No HA, no N/V  no legs weakness    Objective:   Vitals: /81   Pulse (!) 102   Temp 98 °F (36.7 °C) (Oral)   Resp 16   Ht 5' 2\" (1.575 m)   Wt 162 lb 9.6 oz (73.8 kg)   SpO2 97%   BMI 29.74 kg/m²   General appearance: alert and cooperative with exam  HEENT: Head: Normocephalic,   Neck: no JVD,   Lungs: clear to auscultation bilaterally  Heart: regular rate and rhythm, S1, S2 normal, no murmur, click, rub or gallop  Abdomen: soft, non-tender; bowel sounds normal; no masses,  no organomegaly  Extremities: no cyanosis or edema  Neurologic: Alert, oriented, thought content appropriate      Medications:   Scheduled Meds:   famotidine  20 mg Oral Daily    sodium chloride flush  5-40 mL IntraVENous 2 times per day    polyethylene glycol  17 g Oral Daily    bisacodyl  5 mg Oral Daily    sennosides-docusate sodium  1 tablet Oral BID    amLODIPine  2.5 mg Oral Daily     Continuous Infusions:   sodium chloride 100 mL/hr at 08/17/22 0412    sodium chloride 100 mL/hr at 08/16/22 1759       Lab Results:   CBC:   Recent Labs     08/15/22  0550 08/16/22  0420 08/17/22  0513   HGB 11.0* 11.0* 11.3*       BMP:    Recent Labs     08/15/22  0550 08/16/22  0420 08/17/22  0513    138 138   K 5.1 4.2 3.8   CL 99 104 106   CO2 30 24 21*   BUN 8 9 11   CREATININE 0.4 0.3* 0.4   GLUCOSE 123* 141* 99           Assessment and Plan:   L1-S1 degenerative disc disease, facet arthropathy, disc protrusions with stenosis and neurogenic claudication. S/p L1-5 Decompression L1-5 PSF  HTN  HA-spinal    Plan:  Continue with amlodipine. Bowel regimen  SCDs.         Taqueria Martin MD, MD

## 2022-08-17 NOTE — PROGRESS NOTES
Problem: Discharge Planning  Goal: Discharge to home or other facility with appropriate resources  [id number] by Ivy Kitchen RN  Outcome: Progressing  Flowsheets (Taken [id number]/[id number] 1027)  Discharge to home or other facility with appropriate resources:   Identify barriers to discharge with patient and caregiver   Arrange for needed discharge resources and transportation as appropriate   Identify discharge learning needs (meds, wound care, etc)   Refer to discharge planning if patient needs post-hospital services based on physician order or complex needs related to functional status, cognitive ability or social support system  Note: Is ambulating well 400 feet. Needs lots of encouragement. Still having headaches off and on     Problem: Pain  Goal: Verbalizes/displays adequate comfort level or baseline comfort level  8/17/2022 1027 by Ivy Kitchen RN  Outcome: Progressing  Flowsheets (Taken 8/17/2022 1027)  Verbalizes/displays adequate comfort level or baseline comfort level:   Encourage patient to monitor pain and request assistance   Assess pain using appropriate pain scale   Administer analgesics based on type and severity of pain and evaluate response   Implement non-pharmacological measures as appropriate and evaluate response   Notify Licensed Independent Practitioner if interventions unsuccessful or patient reports new pain     Problem: ABCDS Injury Assessment  Goal: Absence of physical injury  8/17/2022 1027 by Ivy Kitchen RN  Outcome: Progressing  Note: Pt using call light appropriately to call for assistance with ambulation to the bathroom and to chair. Pt is also compliant with use of non-skid slippers. Pt reports understanding of fall prevention when discussed.      Problem: Musculoskeletal - Adult  Goal: Return mobility to safest level of function  8/17/2022 1027 by Ivy Kitchen RN  Outcome: Progressing  Flowsheets (Taken 8/17/2022 1027)  Return Mobility to Safest Level of Function:   Assess patient

## 2022-08-18 LAB
ANION GAP SERPL CALCULATED.3IONS-SCNC: 10 MEQ/L (ref 8–16)
BUN BLDV-MCNC: 9 MG/DL (ref 7–22)
CALCIUM SERPL-MCNC: 8.5 MG/DL (ref 8.5–10.5)
CHLORIDE BLD-SCNC: 103 MEQ/L (ref 98–111)
CO2: 27 MEQ/L (ref 23–33)
CREAT SERPL-MCNC: 0.4 MG/DL (ref 0.4–1.2)
GFR SERPL CREATININE-BSD FRML MDRD: > 90 ML/MIN/1.73M2
GLUCOSE BLD-MCNC: 97 MG/DL (ref 70–108)
HCT VFR BLD CALC: 33.5 % (ref 37–47)
HEMOGLOBIN: 11.3 GM/DL (ref 12–16)
POTASSIUM SERPL-SCNC: 3.9 MEQ/L (ref 3.5–5.2)
SODIUM BLD-SCNC: 140 MEQ/L (ref 135–145)

## 2022-08-18 PROCEDURE — 97530 THERAPEUTIC ACTIVITIES: CPT

## 2022-08-18 PROCEDURE — 80048 BASIC METABOLIC PNL TOTAL CA: CPT

## 2022-08-18 PROCEDURE — 97535 SELF CARE MNGMENT TRAINING: CPT

## 2022-08-18 PROCEDURE — 6370000000 HC RX 637 (ALT 250 FOR IP): Performed by: PHYSICIAN ASSISTANT

## 2022-08-18 PROCEDURE — 97110 THERAPEUTIC EXERCISES: CPT

## 2022-08-18 PROCEDURE — 85014 HEMATOCRIT: CPT

## 2022-08-18 PROCEDURE — 85018 HEMOGLOBIN: CPT

## 2022-08-18 PROCEDURE — 36415 COLL VENOUS BLD VENIPUNCTURE: CPT

## 2022-08-18 PROCEDURE — 2580000003 HC RX 258: Performed by: PHYSICIAN ASSISTANT

## 2022-08-18 PROCEDURE — 1200000000 HC SEMI PRIVATE

## 2022-08-18 RX ADMIN — ALPRAZOLAM 0.25 MG: 0.25 TABLET ORAL at 14:33

## 2022-08-18 RX ADMIN — SODIUM CHLORIDE, PRESERVATIVE FREE 10 ML: 5 INJECTION INTRAVENOUS at 19:45

## 2022-08-18 RX ADMIN — OXYCODONE 10 MG: 5 TABLET ORAL at 14:33

## 2022-08-18 RX ADMIN — SENNOSIDES AND DOCUSATE SODIUM 1 TABLET: 50; 8.6 TABLET ORAL at 19:44

## 2022-08-18 RX ADMIN — POLYETHYLENE GLYCOL 3350 17 G: 17 POWDER, FOR SOLUTION ORAL at 09:56

## 2022-08-18 RX ADMIN — SENNOSIDES AND DOCUSATE SODIUM 1 TABLET: 50; 8.6 TABLET ORAL at 09:56

## 2022-08-18 RX ADMIN — BISACODYL 5 MG: 5 TABLET, COATED ORAL at 09:56

## 2022-08-18 RX ADMIN — CYCLOBENZAPRINE 10 MG: 10 TABLET, FILM COATED ORAL at 19:49

## 2022-08-18 RX ADMIN — SODIUM CHLORIDE, PRESERVATIVE FREE 10 ML: 5 INJECTION INTRAVENOUS at 09:56

## 2022-08-18 RX ADMIN — FAMOTIDINE 20 MG: 20 TABLET ORAL at 09:56

## 2022-08-18 RX ADMIN — CYCLOBENZAPRINE 10 MG: 10 TABLET, FILM COATED ORAL at 06:22

## 2022-08-18 RX ADMIN — OXYCODONE 10 MG: 5 TABLET ORAL at 19:49

## 2022-08-18 RX ADMIN — OXYCODONE 10 MG: 5 TABLET ORAL at 09:58

## 2022-08-18 RX ADMIN — OXYCODONE 10 MG: 5 TABLET ORAL at 00:44

## 2022-08-18 RX ADMIN — AMLODIPINE BESYLATE 2.5 MG: 2.5 TABLET ORAL at 09:56

## 2022-08-18 RX ADMIN — ALPRAZOLAM 0.25 MG: 0.25 TABLET ORAL at 06:23

## 2022-08-18 RX ADMIN — ONDANSETRON 4 MG: 4 TABLET, ORALLY DISINTEGRATING ORAL at 18:16

## 2022-08-18 ASSESSMENT — PAIN SCALES - GENERAL
PAINLEVEL_OUTOF10: 3
PAINLEVEL_OUTOF10: 9
PAINLEVEL_OUTOF10: 8
PAINLEVEL_OUTOF10: 6
PAINLEVEL_OUTOF10: 7
PAINLEVEL_OUTOF10: 8
PAINLEVEL_OUTOF10: 7
PAINLEVEL_OUTOF10: 5
PAINLEVEL_OUTOF10: 7
PAINLEVEL_OUTOF10: 4

## 2022-08-18 ASSESSMENT — PAIN DESCRIPTION - LOCATION
LOCATION: LEG
LOCATION: BACK
LOCATION: BACK;LEG

## 2022-08-18 ASSESSMENT — PAIN DESCRIPTION - ONSET: ONSET: ON-GOING

## 2022-08-18 ASSESSMENT — PAIN DESCRIPTION - PAIN TYPE
TYPE: OTHER (COMMENT)
TYPE: ACUTE PAIN;SURGICAL PAIN
TYPE: SURGICAL PAIN

## 2022-08-18 ASSESSMENT — PAIN DESCRIPTION - ORIENTATION
ORIENTATION: UPPER
ORIENTATION: UPPER
ORIENTATION: MID
ORIENTATION: MID

## 2022-08-18 ASSESSMENT — PAIN DESCRIPTION - DESCRIPTORS
DESCRIPTORS: ACHING
DESCRIPTORS: ACHING;DISCOMFORT
DESCRIPTORS: ACHING

## 2022-08-18 ASSESSMENT — PAIN DESCRIPTION - FREQUENCY
FREQUENCY: CONTINUOUS
FREQUENCY: CONTINUOUS

## 2022-08-18 ASSESSMENT — PAIN - FUNCTIONAL ASSESSMENT
PAIN_FUNCTIONAL_ASSESSMENT: ACTIVITIES ARE NOT PREVENTED

## 2022-08-18 NOTE — PROGRESS NOTES
[address] [address]  INPATIENT PHYSICAL THERAPY  DAILY NOTE  [facility] ORTHOPEDICS 7K - [id number]    Time In: 3948  Time Out: 7123  Timed Code Treatment Minutes: 26 Minutes  Minutes: 26          Date: 2022  Patient Name: Mel Sheehan,  Gender:  female        MRN: [id number]  : 1950  (67 y.o.)     Referring Practitioner: SAVANNA Smith  Diagnosis: Spinal stenosis of lumbar region, unspecified whether neurogenic claudication present  Additional Pertinent Hx: The patient is a 40-year-old female with complaints of constant low back pain that radiates pain into the left groin and left anterior thigh and down the left lateral leg to the ankle. Symptoms have been present on a chronic basis and worsening. Current VAS score 8/10. Percentage wise, 50% pain in the back and 50% left leg. Activities that aggravate the pain include standing. Rest helps relieve the pain. Modifying factors include medication management, PT and MARIA DEL CARMEN with really no relief. No bowel or bladder changes. Former smoker. She had a prior back surgery in .  L1-5 Decompression L1-5 Posterior Fusion    on  with dr sanchez     Prior Level of Function:  Lives With: Alone  Type of Home: Trailer  Home Layout: One level  Home Access: Ramped entrance, Stairs to enter with rails  Entrance Stairs - Number of Steps: 4  Entrance Stairs - Rails: Both  Home Equipment: Walker, rolling, Sock aid, Grab bars, Long-handled shoehorn, Reacher   Bathroom Shower/Tub: Walk-in shower  Bathroom Toilet: Handicap height  Bathroom Equipment: Built-in shower seat, Grab bars in shower, Grab bars around toilet, 3-in-1 commode  Bathroom Accessibility: Accessible    ADL Assistance: [address] [place] [address] Avenue: Independent  Homemaking Responsibilities: Yes  Ambulation Assistance: Independent  Transfer Assistance: Independent  Active : Yes    Restrictions/Precautions:  Restrictions/Precautions: Fall Risk, General Precautions  Required Braces or Orthoses  Spinal: Lumbar Corset  Position Activity Restriction  Spinal Precautions: No Bending, No Lifting, No Twisting  Other position/activity restrictions: Lumbar Corset when up     SUBJECTIVE: RN approved session. Patient in bedside chair upon arrival and agreeable to therapy. Educated on home health therapy vs outpatient therapy. PAIN: 8/10: back    Vitals: Vitals not assessed per clinical judgement, see nursing flowsheet    OBJECTIVE:  Bed Mobility:  Sit to Supine: Contact Guard Assistance, Minimal Assistance, X 1, with head of bed flat, without rail, with verbal cues , with increased time for completion, cues for log roll technique     Transfers:  Sit to Stand: Stand By Assistance  Stand to Sit:Stand By Assistance    Ambulation:  Stand By Assistance  Distance: 60ft, 80ft, 50ft  Surface: Level Tile  Device:Rolling Walker  Gait Deviations: Forward Flexed Posture, Slow [place], Decreased Step Length Bilaterally, Decreased Gait Speed, Decreased Heel Strike Bilaterally, and steady, no LOB, limited by pain and fatigue, noted SOB    Balance:  Static Standing Balance: Supervision, BUE support at RW    Exercise:  Patient was guided in 1 set(s) 10 reps of exercise to both lower extremities. Ankle pumps, Glut sets, Quad sets, Heelslides, Hip abduction/adduction, Straight leg raises, Seated marches, Long arc quads, and Seated isometric hip adduction. Exercises were completed for increased independence with functional mobility. Functional Outcome Measures: Completed  AM-PAC Inpatient Mobility Raw Score : 18  AM-PAC Inpatient T-Scale Score : 43.63    ASSESSMENT:  Assessment: Patient progressing toward established goals. Activity Tolerance:  Patient tolerance of  treatment: fair.       Equipment Recommendations:Equipment Needed: No  Discharge Recommendations: Home with Home Health PT and Patient would benefit from continued PT at discharge  Plan: Current Treatment Recommendations: Strengthening, Balance training, Gait training, Stair training, Functional mobility training  Plan:  (6x O)    Patient Education  Patient Education: Plan of Care, Home Exercise Program, Precautions/Restrictions, Bed Mobility, Transfers, Gait, Up in Chair for All Meals, Verbal Exercise Instruction    Goals:  Patient goals : none stated  Short Term Goals  Time Frame for Short term goals: by discharge  Short term goal 1: bed mobility with HOB flat, no rails, mod I for increased functional ind  Short term goal 2: sit<>stand from various surfaces with LRD mod I for safe transfers  Short term goal 3: ambulate 200' with LRD mod I for safe household distances  Short term goal 4: navigate 3 steps with LRD mod I for safe enter/exit of home  Long Term Goals  Time Frame for Long term goals : NA d/t short ELOS    Following session, patient left in safe position with all fall risk precautions in place.

## 2022-08-18 NOTE — CARE COORDINATION
DISCHARGE/PLANNING EVALUATION  8/18/22, 2:56 PM EDT    Reason for Referral: e[facility]   Mental Status:  alert, oriented   Decision Making:  makes own decisions with support from daughter. Family/Social/Home Environment: patient lives at home. She has been independent with her care, but does not feel strong enough to manage care at home alone. Her daughter is available to assist, but works away from home full time, and no other family is available. She is requesting e[facility] for a short rehab stay  Current Services including food security, transportation and housekeeping: no current services, no concerns prior to admission  Current Equipment:walker   Payment Source: medicare, [facility] supplement   Concerns or Barriers to Discharge:  requesting ecf  Post acute provider list with quality measures, geographic area and applicable managed care information provided. Questions regarding selection process answered: declined list, prefers [facility]     Teach Back Method used with patient and daughter  regarding care plan and discharge plan  Patient and daughter verbalize understanding of the plan of care and contribute to goal setting. Patient goals, treatment preferences and discharge plan:  spoke with patient and her daughter. They are requesting [facility] for [facility]hab stay, as patient does not feel strong enough to return home safely.   Referral made to [address] [id number], [facility] is reviewing for admission    Electronically signed by ZA Tejada on 8/18/2022 at 2:56 PM

## 2022-08-18 NOTE — PLAN OF CARE
Problem: Discharge Planning  Goal: Discharge to home or other facility with appropriate resources  Outcome: Progressing  Flowsheets  Taken 8/18/2022 0316  Discharge to home or other facility with appropriate resources:   Identify barriers to discharge with patient and caregiver   Arrange for needed discharge resources and transportation as appropriate   Identify discharge learning needs (meds, wound care, etc)  Taken 8/17/2022 2115  Discharge to home or other facility with appropriate resources: Identify barriers to discharge with patient and caregiver     Problem: ABCDS Injury Assessment  Goal: Absence of physical injury  Outcome: Progressing  Flowsheets (Taken 8/17/2022 2115)  Absence of Physical Injury: Implement safety measures based on patient assessment     Problem: Musculoskeletal - Adult  Goal: Return mobility to safest level of function  Outcome: Progressing  Flowsheets (Taken 8/17/2022 2115)  Return Mobility to Safest Level of Function: Assess patient stability and activity tolerance for standing, transferring and ambulating with or without assistive devices     Problem: Gastrointestinal - Adult  Goal: Maintains or returns to baseline bowel function  Outcome: Progressing  Flowsheets  Taken 8/18/2022 0316  Maintains or returns to baseline bowel function: Assess bowel function  Taken 8/17/2022 2115  Maintains or returns to baseline bowel function: Assess bowel function     Problem: Safety - Adult  Goal: Free from fall injury  Outcome: Progressing  Flowsheets  Taken 8/18/2022 0316  Free From Fall Injury: Instruct family/caregiver on patient safety  Taken 8/17/2022 2115  Free From Fall Injury: Instruct family/caregiver on patient safety     Problem: Pain  Goal: Verbalizes/displays adequate comfort level or baseline comfort level  Flowsheets (Taken 8/16/2022 1027 by Dwight Madrid RN)  Verbalizes/displays adequate comfort level or baseline comfort level:   Encourage patient to monitor pain and request assistance   Assess pain using appropriate pain scale   Administer analgesics based on type and severity of pain and evaluate response   Implement non-pharmacological measures as appropriate and evaluate response   Notify Licensed Independent Practitioner if interventions unsuccessful or patient reports new pain     Problem: Musculoskeletal - Adult  Goal: Maintain proper alignment of affected body part  Recent Flowsheet Documentation  Taken 8/17/2022 2115 by Kris Couch RN  Maintain proper alignment of affected body part: Support and protect limb and body alignment per provider's orders     Problem: Genitourinary - Adult  Goal: Urinary catheter remains patent  Recent Flowsheet Documentation  Taken 8/17/2022 2115 by Kris Couch RN  Urinary catheter remains patent: Assess patency of urinary catheter  Goal: Absence of urinary retention  Recent Flowsheet Documentation  Taken 8/17/2022 2115 by Kris Couch RN  Absence of urinary retention: Assess patients ability to void and empty bladder     Problem: Infection - Adult  Goal: Absence of infection during hospitalization  Recent Flowsheet Documentation  Taken 8/17/2022 2115 by Kris Couch RN  Absence of infection during hospitalization: Assess and monitor for signs and symptoms of infection     Problem: Skin/Tissue Integrity - Adult  Goal: Incisions, wounds, or drain sites healing without S/S of infection  Recent Flowsheet Documentation  Taken 8/17/2022 2115 by Kris Couch RN  Incisions, Wounds, or Drain Sites Healing Without Sign and Symptoms of Infection: TWICE DAILY: Assess and document skin integrity     Problem: Hematologic - Adult  Goal: Maintains hematologic stability  Recent Flowsheet Documentation  Taken 8/17/2022 2115 by Kris Couch RN  Maintains hematologic stability: Assess for signs and symptoms of bleeding or hemorrhage

## 2022-08-18 NOTE — DISCHARGE INSTRUCTIONS
Back Surgery    Activity    No lifting, pushing, or pulling    Up as tolerated at least 3-4 times per day. Up walking-helps to decrease the risk of blood clots    Wear judy hose as directed per your physician     No driving until cleared by your physician    Back Brace or abdominal binder    If your physician prescribes a brace/abdominal binder, wear back brace at all times. May remove when in bed or bathing    Follow all back precautions. Incision Care    Keep back incision dry and intact. Apply clean dry dressing once a day. May shower  if  no drainage from your incision-unless otherwise directed per your physician    No tub baths-no soaking of incision-no swimming     No heaving lifting of more than 5 lbs/or as directed per your physician       Diet    Increase Fluid/Water intake, eat foods high in fiber; fruits and vegetables to help to prevent constipation    Examples of foods high in fiber    Vegetables      All vegetables, especially asparagus, bean sprouts, broccoli, [place]  sprouts, cabbage, carrots, cauliflower, celery, corn, greens, green beans,  green  pepper, onions, peas, potatoes (with skin), snow peas, spinach,  squash,  sweet potatoes, tomatoes, zucchini      For maximum fiber intake, eat the peels of fruits and vegetables just be sure  to wash them well first.     Fruits    All fruits, especially apples, berries, grapefruits, mangoes, nectarines,  oranges, peaches, pears, dried fruits (figs, dates, prunes, raisins)      Choose raw fruits and vegetables over juice, cooked, or canned raw fruit  has  more fiber. Dried fruit is also a good source of fiber.        Some of the common symptoms of a surgical site infection are:    Symptoms in the area where the surgery took place:     Redness   Drainage   Pus   Pain   Swelling   Bad smell     Prevention of surgical site infection:    Make sure that your healthcare providers clean their hands before examining you - either with soap and water

## 2022-08-18 NOTE — PROGRESS NOTES
INTERNAL MEDICINE Progress Note  8/18/2022 4:43 PM  Subjective:   Admit Date: 8/12/2022  PCP: Chet Dodson MD  Interval History:     No new c/o  no legs weakness    Objective:   Vitals: /80   Pulse (!) 123   Temp 98 °F (36.7 °C) (Oral)   Resp 18   Ht 5' 2\" (1.575 m)   Wt 162 lb 9.6 oz (73.8 kg)   SpO2 94%   BMI 29.74 kg/m²   General appearance: alert and cooperative with exam  HEENT: Head: Normocephalic,   Neck: no JVD,   Lungs: clear to auscultation bilaterally  Heart: regular rate and rhythm, S1, S2 normal, no murmur, click, rub or gallop  Abdomen: soft, non-tender; bowel sounds normal; no masses,  no organomegaly  Extremities: no cyanosis or edema  Neurologic: Alert, oriented, thought content appropriate      Medications:   Scheduled Meds:   famotidine  20 mg Oral Daily    sodium chloride flush  5-40 mL IntraVENous 2 times per day    polyethylene glycol  17 g Oral Daily    bisacodyl  5 mg Oral Daily    sennosides-docusate sodium  1 tablet Oral BID    amLODIPine  2.5 mg Oral Daily     Continuous Infusions:   sodium chloride 100 mL/hr at 08/16/22 1759       Lab Results:   CBC:   Recent Labs     08/16/22  0420 08/17/22  0513 08/18/22  0521   HGB 11.0* 11.3* 11.3*       BMP:    Recent Labs     08/16/22  0420 08/17/22  0513 08/18/22  0521    138 140   K 4.2 3.8 3.9    106 103   CO2 24 21* 27   BUN 9 11 9   CREATININE 0.3* 0.4 0.4   GLUCOSE 141* 99 97           Assessment and Plan:   L1-S1 degenerative disc disease, facet arthropathy, disc protrusions with stenosis and neurogenic claudication. S/p L1-5 Decompression L1-5 PSF  HTN  HA-spinal    Plan:  Continue amlodipine. Bowel regimen  SCDs.   Rachelle Finney MD, MD

## 2022-08-18 NOTE — PROGRESS NOTES
Department of Orthopedic Surgery  Spine Service  Attending Progress Note        Subjective:  POD#6 patient doing okay. Drains removed yesterday. Denies HA yesterday and this morning. Sitting up at 45 degrees. +BM. Vitals  VITALS:  /78   Pulse (!) 110   Temp 98.3 °F (36.8 °C) (Oral)   Resp 18   Ht 5' 2\" (1.575 m)   Wt 162 lb 9.6 oz (73.8 kg)   SpO2 95%   BMI 29.74 kg/m²   24HR INTAKE/OUTPUT:    Intake/Output Summary (Last 24 hours) at 8/18/2022 0729  Last data filed at 8/17/2022 2200  Gross per 24 hour   Intake 400 ml   Output 280 ml   Net 120 ml       URINARY CATHETER OUTPUT (Melchor):  [REMOVED] Urinary Catheter Melchor-Output (mL): 300 mL  DRAIN/TUBE OUTPUT:  [REMOVED] Closed/Suction Drain Inferior;Right Back Accordion-Output (ml): 70 ml  [REMOVED] Closed/Suction Drain Inferior; Left Back Accordion-Output (ml): 20 ml      PHYSICAL EXAM:    Orientation:  alert and oriented to person, place and time    Incision:  dressing in place, clean, dry, intact      Lower Extremity Motor :  quadriceps, extensor hallucis longus, dorsiflexion, plantarflexion 5/5 bilaterally  Lower Extremity Sensory:  Intact L1-S1    Flatus:  positive    LABS:    HgB:    Lab Results   Component Value Date/Time    HGB 11.3 08/18/2022 05:21 AM     Hemoglobin/Hematocrit:    Lab Results   Component Value Date/Time    HGB 11.3 08/18/2022 05:21 AM    HCT 33.5 08/18/2022 05:21 AM     BMP:    Lab Results   Component Value Date/Time     08/18/2022 05:21 AM    K 3.9 08/18/2022 05:21 AM     08/18/2022 05:21 AM    CO2 27 08/18/2022 05:21 AM    BUN 9 08/18/2022 05:21 AM    LABALBU 4.4 09/02/2021 11:10 AM    LABALBU 4.5 07/15/2011 03:09 PM    CREATININE 0.4 08/18/2022 05:21 AM    CALCIUM 8.5 08/18/2022 05:21 AM    LABGLOM >90 08/18/2022 05:21 AM    GLUCOSE 97 08/18/2022 05:21 AM    GLUCOSE 92 07/15/2011 03:09 PM       ASSESSMENT AND PLAN:    Post operative day 6 status post L1-5 decompression and fusion    1:  Monitor labs  2:  Activity Level:  As tolerated. PT/OT, back brace when ambulating.   3:  Pain Control:  Good  4:  Discharge Planning:  [facility]       Hi Jack PA-C

## 2022-08-18 NOTE — PROGRESS NOTES
[facility]  Occupational Therapy  Daily Note  Time:   Time In: 9746  Time Out: 1416  Timed Code Treatment Minutes: 45 Minutes  Minutes: 38          Date: 2022  Patient Name: Yashira Sepulveda,   Gender: female      Room: [facility]/019-A  MRN: [id number]  : 1950  (67 y.o.)  Referring Practitioner: SAVANNA La  Diagnosis: Lumbar Stenosis with neurogenic claudication  Additional Pertinent Hx: This is a 67 y.o. female with refractory back and left leg pain from degenerative disc disease and lumbar stenosis from L1-5. Patient has failed full conservative therapy including medication management, physical therapy, and epidural steroid injections. Due to the persistence of symptoms and reduction in the ADLs, patient elected surgical treatment. Patient, therefore, understood indications for the surgery as well as its risks, benefits, and alternatives. These risks include but are not limited to paralysis, infection, hematoma, dural tear, nerve root injury, nonunion, DVT/PE, stroke, MI, death etc.  All questions were answered. Informed consent was obtained. Pt s/p L1-5 bilateral laminectomy, partial medial facetectomies and foraminotomies of L1, L2, L3, L4 and L5 nerve roots, L1-5 posterior spinal fusion,  L1-5 posterior spinal instrumentation, Streamline, SurgAlign instrumentation, Use of local autograft bone and crushed cancellous chips    Restrictions/Precautions:  Restrictions/Precautions: Fall Risk, General Precautions  Required Braces or Orthoses  Spinal: Lumbar Corset  Position Activity Restriction  Spinal Precautions: No Bending, No Lifting, No Twisting  Other position/activity restrictions: Lumbar Corset when up     SUBJECTIVE: RN approved of OT session. Pt supine in bed on OT arrival and agreeable to therapy. Pt's daughter present in room and stated that she is feeling very anxious about pt returning home with only home health.  The daughter does not feel that she can provide all the assistance that the patient needs d/t working everyday and feels that the patient is not safe at home alone. Pt also stated that she does not  feel she is ready to return home and would like a few weeks at a SNF for rehab before returning home. PAIN: c/o lower back pain - with requesting pain medication; however did not rate - RN notified    Vitals: Vitals not assessed per clinical judgement, see nursing flowsheet    COGNITION: Slow Processing, Decreased Insight, Decreased Problem Solving, and Decreased Safety Awareness    ADL:   Upper Extremity Dressing: stand by assistance. Sitting EOB, pt donned pull over shirt and lumbar corset brace. Pt donned pull over shirt without assistance. Pt required education and verbal cues on technique to don lumbar corset brace. Pt attempted x1 to don corset brace, however was unsuccessful d/t limited ROM on right shoulder. Pt then attempted with using reacher, with success when provided min verbal cues for technique. Pt practiced x2 times with using reacher with success. However required extensive time to complete   Lower Extremity Dressing: Stand By Assistance. To don undergarments, elastic pants and slip on tennis shoes. Pt completed dressing tasks with reacher and shoe horn. Pt required increase time to complete and required mod verbal cues for technique  Toileting: Contact Guard Assistance. For pericare and clothing management. Pt completed clothing management with a reacher d/t pants dropping to ankles when standing - required increase time to identify best technique with reacher. Toilet Transfer: Contact Guard Assistance. To/from standard toilet - required rocking and x2 attempts before successful. Leslee Bloodgood BALANCE:  Sitting Balance:  Modified Independent. On toilet  Standing Balance: [employer].  With 2 Ue release during ADL  - standing x1 minute, x2 times     BED MOBILITY:  Supine to Sit: Supervision log roll technique with using railing session, patient left in safe position with all fall risk precautions in place.

## 2022-08-18 NOTE — CARE COORDINATION
8/18/22, 10:49 AM EDT  Home alone;  daughter Kelvin Lipscomb lives close-by and will be checking on pt. [facility] for PT. Notified [facility] of referral.    56-  daughter refuses to take pt home. Duran Noriega and this CM spoke with pt and her daughter. Pt feels she is still unsteady and unsure of herself and wants to go ECF; SW consulted and follows.

## 2022-08-18 NOTE — PLAN OF CARE
Problem: Discharge Planning  Goal: Discharge to home or other facility with appropriate resources  8/18/2022 1700 by Maycol Hoffmann RN  Outcome: Progressing  Flowsheets (Taken 8/18/2022 1700)  Discharge to home or other facility with appropriate resources:   Identify barriers to discharge with patient and caregiver   Identify discharge learning needs (meds, wound care, etc)  Note: Discharge planning in progress. Patient is now requesting an ECF at discharge. Case management notified and assisting with discharge needs. Problem: Pain  Goal: Verbalizes/displays adequate comfort level or baseline comfort level  8/18/2022 1700 by Maycol Hoffmann RN  Outcome: Progressing  Flowsheets (Taken 8/18/2022 1700)  Verbalizes/displays adequate comfort level or baseline comfort level:   Encourage patient to monitor pain and request assistance   Assess pain using appropriate pain scale  Note: Patient reports pain in the mid back with a rating of 8-9. PRN pain medication given as ordered along with frequent repositioning. Patient's stated pain goal is 5. Problem: ABCDS Injury Assessment  Goal: Absence of physical injury  8/18/2022 1700 by Maycol Hoffmann RN  Outcome: Progressing  Flowsheets (Taken 8/18/2022 1700)  Absence of Physical Injury: Implement safety measures based on patient assessment  Note: Patient has remained free of physical injury during this shift. Safe environment provided, call light within reach, and hourly rounding completed.         Problem: Musculoskeletal - Adult  Goal: Return mobility to safest level of function  8/18/2022 1700 by Maycol Hoffmann RN  Outcome: Progressing  Flowsheets (Taken 8/18/2022 1700)  Return Mobility to Safest Level of Function:   Assess patient stability and activity tolerance for standing, transferring and ambulating with or without assistive devices   Assist with transfers and ambulation using safe patient handling equipment as needed  Note: Patient is up Problem: Hematologic - Adult  Goal: Maintains hematologic stability  Outcome: Progressing  Flowsheets (Taken 8/18/2022 1700)  Maintains hematologic stability: Assess for signs and symptoms of bleeding or hemorrhage  Note: No s/sx of excessive bleeding or hemorrhage noted. Care plan reviewed with patient. Patient verbalize understanding of the plan of care and contribute to goal setting.

## 2022-08-18 NOTE — PROGRESS NOTES
08/18/22 1548   Encounter Summary   Encounter Overview/Reason  Initial Encounter   Service Provided For: Patient   Referral/Consult From: [facility]   Support System Children   Last Encounter  08/18/22   Complexity of Encounter Low   Begin Time 1447   End Time  1510   Total Time Calculated 23 min   Encounter    Type Initial Screen/Assessment   Assessment/Intervention/Outcome   Assessment Anxious; Coping   Intervention Sustaining Presence/Ministry of presence; Active listening;Discussed belief system/Alevism practices/madalyn   Outcome Acceptance     Rev. Dr. Mima Guy:      ASSESSMENT  Patient has orthopedic issues. She had a hip surgery in the past. \"I don't know how I am doing. \" She lives alone. Daughter is in the support system. INTERVENTION  She wanted me to pray. I discussed spiritual matters and faithfulness to God. She agreed. She has attended [employer] in earlier times. OUTCOME  After prayer, she was brightened up to thank me for stopping by and praying for her. CARE PLAN  She may be dismissed in the near future, as she mentioned. So, no recommendation.

## 2022-08-19 VITALS
SYSTOLIC BLOOD PRESSURE: 119 MMHG | WEIGHT: 162.6 LBS | RESPIRATION RATE: 16 BRPM | TEMPERATURE: 98.2 F | HEART RATE: 119 BPM | OXYGEN SATURATION: 96 % | DIASTOLIC BLOOD PRESSURE: 80 MMHG | BODY MASS INDEX: 29.92 KG/M2 | HEIGHT: 62 IN

## 2022-08-19 PROBLEM — F41.1 GAD (GENERALIZED ANXIETY DISORDER): Status: ACTIVE | Noted: 2022-08-19

## 2022-08-19 LAB
ANION GAP SERPL CALCULATED.3IONS-SCNC: 10 MEQ/L (ref 8–16)
BUN BLDV-MCNC: 9 MG/DL (ref 7–22)
CALCIUM SERPL-MCNC: 8.3 MG/DL (ref 8.5–10.5)
CHLORIDE BLD-SCNC: 99 MEQ/L (ref 98–111)
CO2: 28 MEQ/L (ref 23–33)
CREAT SERPL-MCNC: 0.4 MG/DL (ref 0.4–1.2)
EKG ATRIAL RATE: 118 BPM
EKG P AXIS: 48 DEGREES
EKG P-R INTERVAL: 142 MS
EKG Q-T INTERVAL: 300 MS
EKG QRS DURATION: 70 MS
EKG QTC CALCULATION (BAZETT): 420 MS
EKG T AXIS: 45 DEGREES
EKG VENTRICULAR RATE: 118 BPM
GFR SERPL CREATININE-BSD FRML MDRD: > 90 ML/MIN/1.73M2
GLUCOSE BLD-MCNC: 103 MG/DL (ref 70–108)
HCT VFR BLD CALC: 33.8 % (ref 37–47)
HEMOGLOBIN: 11.3 GM/DL (ref 12–16)
POTASSIUM SERPL-SCNC: 4 MEQ/L (ref 3.5–5.2)
SARS-COV-2, NAAT: NOT DETECTED
SODIUM BLD-SCNC: 137 MEQ/L (ref 135–145)

## 2022-08-19 PROCEDURE — 2580000003 HC RX 258: Performed by: PHYSICIAN ASSISTANT

## 2022-08-19 PROCEDURE — 97116 GAIT TRAINING THERAPY: CPT

## 2022-08-19 PROCEDURE — 93010 ELECTROCARDIOGRAM REPORT: CPT | Performed by: INTERNAL MEDICINE

## 2022-08-19 PROCEDURE — 87635 SARS-COV-2 COVID-19 AMP PRB: CPT

## 2022-08-19 PROCEDURE — 97110 THERAPEUTIC EXERCISES: CPT

## 2022-08-19 PROCEDURE — 85018 HEMOGLOBIN: CPT

## 2022-08-19 PROCEDURE — 93005 ELECTROCARDIOGRAM TRACING: CPT | Performed by: INTERNAL MEDICINE

## 2022-08-19 PROCEDURE — 36415 COLL VENOUS BLD VENIPUNCTURE: CPT

## 2022-08-19 PROCEDURE — 6370000000 HC RX 637 (ALT 250 FOR IP): Performed by: INTERNAL MEDICINE

## 2022-08-19 PROCEDURE — 85014 HEMATOCRIT: CPT

## 2022-08-19 PROCEDURE — 80048 BASIC METABOLIC PNL TOTAL CA: CPT

## 2022-08-19 PROCEDURE — 6370000000 HC RX 637 (ALT 250 FOR IP): Performed by: PHYSICIAN ASSISTANT

## 2022-08-19 PROCEDURE — 97535 SELF CARE MNGMENT TRAINING: CPT

## 2022-08-19 RX ORDER — BUSPIRONE HYDROCHLORIDE 10 MG/1
10 TABLET ORAL 3 TIMES DAILY
Qty: 90 TABLET | Refills: 0 | Status: SHIPPED | OUTPATIENT
Start: 2022-08-19 | End: 2022-09-02 | Stop reason: SINTOL

## 2022-08-19 RX ORDER — CYCLOBENZAPRINE HCL 10 MG
10 TABLET ORAL 3 TIMES DAILY PRN
Qty: 21 TABLET | Refills: 0 | Status: SHIPPED | OUTPATIENT
Start: 2022-08-19 | End: 2022-08-26

## 2022-08-19 RX ORDER — ALPRAZOLAM 0.25 MG/1
0.25 TABLET ORAL 3 TIMES DAILY PRN
Qty: 21 TABLET | Refills: 0 | Status: SHIPPED | OUTPATIENT
Start: 2022-08-19 | End: 2022-08-26

## 2022-08-19 RX ORDER — ALPRAZOLAM 0.25 MG/1
0.25 TABLET ORAL 3 TIMES DAILY PRN
Status: DISCONTINUED | OUTPATIENT
Start: 2022-08-19 | End: 2022-08-19 | Stop reason: HOSPADM

## 2022-08-19 RX ORDER — OXYCODONE HYDROCHLORIDE AND ACETAMINOPHEN 5; 325 MG/1; MG/1
1 TABLET ORAL EVERY 6 HOURS PRN
Qty: 28 TABLET | Refills: 0 | Status: SHIPPED | OUTPATIENT
Start: 2022-08-19 | End: 2022-08-26

## 2022-08-19 RX ADMIN — OXYCODONE 10 MG: 5 TABLET ORAL at 07:00

## 2022-08-19 RX ADMIN — CYCLOBENZAPRINE 10 MG: 10 TABLET, FILM COATED ORAL at 07:00

## 2022-08-19 RX ADMIN — OXYCODONE 10 MG: 5 TABLET ORAL at 16:05

## 2022-08-19 RX ADMIN — CYCLOBENZAPRINE 10 MG: 10 TABLET, FILM COATED ORAL at 16:05

## 2022-08-19 RX ADMIN — SODIUM CHLORIDE, PRESERVATIVE FREE 10 ML: 5 INJECTION INTRAVENOUS at 10:13

## 2022-08-19 RX ADMIN — AMLODIPINE BESYLATE 2.5 MG: 2.5 TABLET ORAL at 10:13

## 2022-08-19 RX ADMIN — BISACODYL 5 MG: 5 TABLET, COATED ORAL at 10:13

## 2022-08-19 RX ADMIN — FAMOTIDINE 20 MG: 20 TABLET ORAL at 10:13

## 2022-08-19 RX ADMIN — SENNOSIDES AND DOCUSATE SODIUM 1 TABLET: 50; 8.6 TABLET ORAL at 10:13

## 2022-08-19 RX ADMIN — POLYETHYLENE GLYCOL 3350 17 G: 17 POWDER, FOR SOLUTION ORAL at 10:13

## 2022-08-19 RX ADMIN — OXYCODONE 10 MG: 5 TABLET ORAL at 01:59

## 2022-08-19 RX ADMIN — ALPRAZOLAM 0.25 MG: 0.25 TABLET ORAL at 10:13

## 2022-08-19 ASSESSMENT — PAIN DESCRIPTION - ONSET: ONSET: ON-GOING

## 2022-08-19 ASSESSMENT — PAIN - FUNCTIONAL ASSESSMENT: PAIN_FUNCTIONAL_ASSESSMENT: PREVENTS OR INTERFERES SOME ACTIVE ACTIVITIES AND ADLS

## 2022-08-19 ASSESSMENT — PAIN DESCRIPTION - LOCATION
LOCATION: BACK
LOCATION: BACK

## 2022-08-19 ASSESSMENT — PAIN DESCRIPTION - DIRECTION: RADIATING_TOWARDS: LEFT CALF

## 2022-08-19 ASSESSMENT — PAIN SCALES - GENERAL
PAINLEVEL_OUTOF10: 8
PAINLEVEL_OUTOF10: 9
PAINLEVEL_OUTOF10: 6

## 2022-08-19 ASSESSMENT — PAIN DESCRIPTION - ORIENTATION
ORIENTATION: MID;LOWER
ORIENTATION: MID;LOWER

## 2022-08-19 ASSESSMENT — PAIN DESCRIPTION - DESCRIPTORS
DESCRIPTORS: SORE
DESCRIPTORS: ACHING;DISCOMFORT

## 2022-08-19 ASSESSMENT — PAIN DESCRIPTION - FREQUENCY: FREQUENCY: CONTINUOUS

## 2022-08-19 ASSESSMENT — PAIN DESCRIPTION - PAIN TYPE: TYPE: SURGICAL PAIN

## 2022-08-19 NOTE — PROGRESS NOTES
Pt given blue packet to take to ECF. Daughter advised to give entire packet to jagruti alvarez including rxs in packet. Pt to car via wheelchair. All personal belongings sent with pt.

## 2022-08-19 NOTE — PROGRESS NOTES
INTERNAL MEDICINE Progress Note  8/19/2022 2:00 PM  Subjective:   Admit Date: 8/12/2022  PCP: Rex Templeton MD  Interval History:     Requests med for anxiety  no legs weakness    Objective:   Vitals: /80   Pulse (!) 119   Temp 98.2 °F (36.8 °C) (Oral)   Resp 16   Ht 5' 2\" (1.575 m)   Wt 162 lb 9.6 oz (73.8 kg)   SpO2 96%   BMI 29.74 kg/m²   General appearance: alert and cooperative with exam  HEENT: Head: Normocephalic,   Neck: no JVD,   Lungs: clear to auscultation bilaterally  Heart: regular rate and rhythm, S1, S2 normal, no murmur, click, rub or gallop  Abdomen: soft, non-tender; bowel sounds normal; no masses,  no organomegaly  Extremities: no cyanosis or edema  Neurologic: Alert, oriented, thought content appropriate      Medications:   Scheduled Meds:   famotidine  20 mg Oral Daily    sodium chloride flush  5-40 mL IntraVENous 2 times per day    polyethylene glycol  17 g Oral Daily    bisacodyl  5 mg Oral Daily    sennosides-docusate sodium  1 tablet Oral BID    amLODIPine  2.5 mg Oral Daily     Continuous Infusions:   sodium chloride 100 mL/hr at 08/16/22 1759       Lab Results:   CBC:   Recent Labs     08/17/22  0513 08/18/22  0521 08/19/22  0523   HGB 11.3* 11.3* 11.3*       BMP:    Recent Labs     08/17/22  0513 08/18/22  0521 08/19/22  0523    140 137   K 3.8 3.9 4.0    103 99   CO2 21* 27 28   BUN 11 9 9   CREATININE 0.4 0.4 0.4   GLUCOSE 99 97 103           Assessment and Plan:   L1-S1 degenerative disc disease, facet arthropathy, disc protrusions with stenosis and neurogenic claudication. S/p L1-5 Decompression L1-5 PSF  HTN  HA-spinal, improved  BRYCE    Plan:  Continue amlodipine. Bowel regimen  SCDs.   Tram Guzman MD, MD

## 2022-08-19 NOTE — CARE COORDINATION
8/19/22, 10:13 AM EDT    DISCHARGE PLANNING EVALUATION    Spoke with [facility], facility will accept today. Discussed with patient, who is in agreement. She has contacted her daughter, who will transport after 3:00. Confirmed plan with [facility], Marquita Macedo faxed. 8/19/22, 2:35 PM EDT    Patient goals/plan/ treatment preferences discussed by  and . Patient goals/plan/ treatment preferences reviewed with patient/ family. Patient/ family verbalize understanding of discharge plan and are in agreement with goal/plan/treatment preferences. Understanding was demonstrated using the teach back method. AVS provided by RN at time of discharge, which includes all necessary medical information pertaining to the patients current course of illness, treatment, post-discharge goals of care, and treatment preferences.      Services At/After Discharge: [facility] (SNF)       IMM Letter  IMM Letter given to Patient/Family/Significant other/Guardian/POA/by[de-identified] LYDIA Arnold  IMM Letter date given[de-identified] 08/17/22  IMM Letter time given[de-identified] 0100

## 2022-08-19 NOTE — PROGRESS NOTES
Department of Orthopedic Surgery  Spine Service  Attending Progress Note        Subjective:  POD#7 Pt sitting up in chair. No HA the past couple days, denies HA today. +BM. C/o left anterolateral thigh N/T. States yesterday she felt weak, didn't get up much, This morning feeling better, ambulated to bathroom. Pt and her family decided on ECF yesterday, currently in progress. Vitals  VITALS:  /72   Pulse (!) 119   Temp 98.3 °F (36.8 °C) (Oral)   Resp 16   Ht 5' 2\" (1.575 m)   Wt 162 lb 9.6 oz (73.8 kg)   SpO2 93%   BMI 29.74 kg/m²   24HR INTAKE/OUTPUT:    Intake/Output Summary (Last 24 hours) at 8/19/2022 0956  Last data filed at 8/18/2022 1430  Gross per 24 hour   Intake 540 ml   Output --   Net 540 ml       URINARY CATHETER OUTPUT (Melchor):  [REMOVED] Urinary Catheter Melchor-Output (mL): 300 mL  DRAIN/TUBE OUTPUT:  [REMOVED] Closed/Suction Drain Inferior;Right Back Accordion-Output (ml): 70 ml  [REMOVED] Closed/Suction Drain Inferior; Left Back Accordion-Output (ml): 20 ml      PHYSICAL EXAM:    Orientation:  alert and oriented to person, place and time    Incision:  dressing in place, clean, dry, intact      Lower Extremity Motor :  quadriceps, extensor hallucis longus, dorsiflexion, plantarflexion 5/5 bilaterally  Lower Extremity Sensory:  Intact L1-S1    Flatus:  positive    LABS:    HgB:    Lab Results   Component Value Date/Time    HGB 11.3 08/19/2022 05:23 AM     Hemoglobin/Hematocrit:    Lab Results   Component Value Date/Time    HGB 11.3 08/19/2022 05:23 AM    HCT 33.8 08/19/2022 05:23 AM     BMP:    Lab Results   Component Value Date/Time     08/19/2022 05:23 AM    K 4.0 08/19/2022 05:23 AM    CL 99 08/19/2022 05:23 AM    CO2 28 08/19/2022 05:23 AM    BUN 9 08/19/2022 05:23 AM    LABALBU 4.4 09/02/2021 11:10 AM    LABALBU 4.5 07/15/2011 03:09 PM    CREATININE 0.4 08/19/2022 05:23 AM    CALCIUM 8.3 08/19/2022 05:23 AM    LABGLOM >90 08/19/2022 05:23 AM    GLUCOSE 103 08/19/2022 05:23 AM GLUCOSE 92 07/15/2011 03:09 PM       ASSESSMENT AND PLAN:    Post operative day 7 status post L1-5 decompression and fusion    1:  Monitor labs  2:  Activity Level:  As tolerated. PT/OT, back brace when ambulating.   3:  Pain Control:  Good  4:  Discharge Planning:  AMADEO Figueroa PA-C

## 2022-08-19 NOTE — PROGRESS NOTES
[facility]  Occupational Therapy  Daily Note  Time:   Time In: 0730  Time Out: 0800  Timed Code Treatment Minutes: 30 Minutes  Minutes: 30          Date: 2022  Patient Name: Isaiah Fernandez,   Gender: female      Room: [facility]019-A  MRN: [id number]  : 1950  (67 y.o.)  Referring Practitioner: SAVANNA Moreland  Diagnosis: Lumbar Stenosis with neurogenic claudication  Additional Pertinent Hx: This is a 67 y.o. female with refractory back and left leg pain from degenerative disc disease and lumbar stenosis from L1-5. Patient has failed full conservative therapy including medication management, physical therapy, and epidural steroid injections. Due to the persistence of symptoms and reduction in the ADLs, patient elected surgical treatment. Patient, therefore, understood indications for the surgery as well as its risks, benefits, and alternatives. These risks include but are not limited to paralysis, infection, hematoma, dural tear, nerve root injury, nonunion, DVT/PE, stroke, MI, death etc.  All questions were answered. Informed consent was obtained. Pt s/p L1-5 bilateral laminectomy, partial medial facetectomies and foraminotomies of L1, L2, L3, L4 and L5 nerve roots, L1-5 posterior spinal fusion,  L1-5 posterior spinal instrumentation, Streamline, SurgAlign instrumentation, Use of local autograft bone and crushed cancellous chips    Restrictions/Precautions:  Restrictions/Precautions: Fall Risk, General Precautions  Required Braces or Orthoses  Spinal: Lumbar Corset  Position Activity Restriction  Spinal Precautions: No Bending, No Lifting, No Twisting  Other position/activity restrictions: Lumbar Corset when up     SUBJECTIVE: RN okayed OT treatment. Pt. In room lying supine agreeable to participate.       PAIN: 7/10: LLE    Vitals: Vitals not assessed per clinical judgement, see nursing flowsheet    COGNITION: WFL    ADL:   Grooming: Stand By Assistance, Contact Guard Assistance, with set-up, and with verbal cues . To stand at sink and complete x 4mins no LOB   Upper Extremity Dressing: Stand By Assistance. To don corset  Toileting: Stand By Assistance. And Contact Guard Assist  Toilet Transfer: [address] Raz Jones BALANCE:  Sitting Balance:  Supervision. Seated on EOB  Standing Balance: Stand By Assistance, [address], with cues for safety. BED MOBILITY:  Supine to Sit: Stand By Assistance, with rail, with verbal cues       TRANSFERS:  Sit to Stand:  Stand By Assistance, [address] From EOB and toilet  Stand to Sit: Stand By Assistance, [address] To EOB, recliner, and toilet    FUNCTIONAL MOBILITY:  Assistive Device: Rolling Walker  Assist Level:  Contact Guard Assistance. Distance: To and from bathroom and and [facility] distance in hallway no LOB steady pace. ADDITIONAL ACTIVITIES:  Pt. Plans to discharge to [facility] reports does not feel comfortable returning home. Discussed benefits of a walker tray upon returning home with transport/access. Pt. Provided with information on where to locate if chooses to purchase. Pt. Able to recall precautions declined need for AE review feels confident in use with LB dressing tasks. ASSESSMENT:     Activity Tolerance:  Patient tolerance of  treatment: good.        Discharge Recommendations: Patient would benefit from continued OT at discharge  Equipment Recommendations: Equipment Needed: No  Plan: Times per Week: 5x  Times per Day: Daily  Current Treatment Recommendations: Strengthening, Balance training, Functional mobility training, Self-Care / ADL, Safety education & training    Patient Education  Patient Education: ADL's, Precautions, Equipment Education, and Home Safety    Goals  Short Term Goals  Time Frame for Short term goals: by discharge  Short Term Goal 1: Pt will complete toileting routine, including t/fs, with no > than Min A and min VC for back precautions to increase indep with self care  Short Term Goal 2: Pt will tolerate dynamic standing x5-6min with SBA and B hand release to increase indep with grooming  Short Term Goal 3: Pt will safely navigate to/from bathroom with SBA and no LOB to increase indep with ADLs  Short Term Goal 4: Pt will complete LB ADLs with LHAE prn, Min A, and no VC for back precautions to increase indep with dressing    Following session, patient left in safe position with all fall risk precautions in place.

## 2022-08-19 NOTE — PROGRESS NOTES
[facility] received discharge medication Buspirone for patient. Upon review of patient chart it was noted by LSW that patient will be discharging to the [address] [facility]. Attempted to call RN to inform we wouldn't be filling prescription due to patient being discharged to SNF but RN unavailable at time of call. Prescription is profiled at [facility]. -Sandy Arnold (ext. [phone]) 8/19/2022,2:12 PM

## 2022-08-19 NOTE — PROGRESS NOTES
Call received from daughter Jamal Lucero asking for update on pt. When asked for hippa code, daughter became upset stating I was the first one to ask for it all week and she had called everyday. Daughter informed it was against federal law to give out information without the hippa code, but I would ask the pt for permission to talk with daughter and give update. Daughter accepts this and placed on hold. Asked pt for permission to give daughter an update and pt agrees at this time. Daughter given update that pt was ok for discharge today and would like daughter to pick her up after work. Daughter doesn't feel pt can ride in a car for this long to [facility]. Therapy working with pt at this time and feels pt is appropriate to transport by car. Daughter informed as to what therapy has said and is ok with this explanation. Daughter thanks this nurse for her time and again states that no one has asked for the hippa code all week and I am the first person to ask. Apologized again for the other staff not asking and informed I would let manager know of this problem.

## 2022-08-19 NOTE — DISCHARGE INSTR - COC
Continuity of Care Form    Patient Name: Rena Kessler   :  1950  MRN:  [id number]    Admit date:  2022  Discharge date:  2022    Code Status Order: Full Code   Advance Directives:   [facility] Documentation       Date/Time Healthcare Directive Type of Healthcare Directive Copy in [address] Agent's Name Healthcare Agent's Phone Number    22 1119 -- -- -- -- Sharon Marx [phone]    22 1481 Yes, patient has an advance directive for healthcare treatment Durable power of  for health care; Health care treatment directive; Living will No, copy requested from [address] [place] [address] --            Admitting Physician:  Jana Escoto MD  PCP: Ruben Lewis MD    Discharging Nurse: SUNRISE CANYON Unit/Room#: 7K-19/019-A  Discharging Unit Phone Number: [phone]    Emergency Contact:   Extended Emergency Contact Information  Primary Emergency Contact: [address] Phone: [phone]  Relation: Child  Secondary Emergency Contact: [place] [address]  Mobile Phone: [phone]  Relation: Niece/Nephew    Past Surgical History:  Past Surgical History:   Procedure Laterality Date    BACK SURGERY      COLONOSCOPY      HYSTERECTOMY (CERVIX STATUS UNKNOWN)      JOINT REPLACEMENT Left 2017    Lt total shoulder , Dr Les Coats N/A 2022    L1-5 Decompression L1-5 Posterior Fusion performed by Jana Escoto MD at [address] [facility] [address] Right 2018       Immunization History:   Immunization History   Administered Date(s) Administered     Influenza, FLUZONE (age 72 y+), High Dose 2021    COVID-19, PFIZER PURPLE top, DILUTE for use, (age 15 y+), 30mcg/0.3mL 2021, 2021, 10/07/2021, 2022    Influenza, Triv, inactivated, subunit, adjuvanted, IM (Fluad 65 yrs and older) 2018 Pneumococcal Conjugate 13-valent ([id number]) 07/23/2014    Pneumococcal Polysaccharide ([id number]) 08/28/2012, 09/22/2017    Tdap (Boostrix, Adacel) 06/15/2009, 08/13/2018    Zoster Live (Zostavax) 07/13/2012    Zoster Recombinant (Shingrix) 09/01/2021       Active Problems:  Patient Active Problem List   Diagnosis Code    COPD (chronic obstructive pulmonary disease) ([facility] J44.9    Osteoarthritis of multiple joints M15.9    Hypertension I10    Lumbar stenosis with neurogenic claudication M48.062       Isolation/Infection:   Isolation            No Isolation          Patient Infection Status       None to display            Nurse Assessment:  Last Vital Signs: /72   Pulse (!) 119   Temp 98.3 °F (36.8 °C) (Oral)   Resp 16   Ht 5' 2\" (1.575 m)   Wt 162 lb 9.6 oz (73.8 kg)   SpO2 93%   BMI 29.74 kg/m²     Last documented pain score (0-10 scale): Pain Level: 9  Last Weight:   Wt Readings from Last 1 Encounters:   08/12/22 162 lb 9.6 oz (73.8 kg)     Mental Status:  oriented and alert    IV Access:  - None    Nursing Mobility/ADLs:  Walking   Assisted  Transfer  Assisted  Bathing  Assisted  Dressing  Assisted  Toileting  Assisted  Feeding  Independent  Med Admin  Assisted  Med Delivery   whole    Wound Care Documentation and Therapy:  Incision 05/18/17 Shoulder Left (Active)   Number of days: 1918       Incision 08/12/22 Back Lower;Medial (Active)   Dressing Status Clean;Dry; Intact 08/18/22 1945   Incision Assessment Dry 08/18/22 1945   Drainage Amount None 08/18/22 1945   Odor None 08/18/22 1945   Number of days: 6        Elimination:  Continence: Bowel: Yes  Bladder: Yes  Urinary Catheter: None   Colostomy/Ileostomy/Ileal Conduit: No       Date of Last BM: 8/16/2022    Intake/Output Summary (Last 24 hours) at 8/19/2022 0947  Last data filed at 8/18/2022 1430  Gross per 24 hour   Intake 540 ml   Output --   Net 540 ml     I/O last 3 completed shifts:   In: 600 [P.O.:590; I.V.:10]  Out: -     Safety Concerns: At Risk for Falls    Impairments/Disabilities:      Vision    Nutrition Therapy:  Current Nutrition Therapy:   - Oral Diet:  General    Routes of Feeding: Oral  Liquids: Thin Liquids  Daily Fluid Restriction: no  Last Modified Barium Swallow with Video (Video Swallowing Test): not done    Treatments at the Time of Hospital Discharge:   Respiratory Treatments: n/a  Oxygen Therapy:  is not on home oxygen therapy. Ventilator:    - No ventilator support    Rehab Therapies: Physical Therapy and Occupational Therapy  Weight Bearing Status/Restrictions: No weight bearing restrictions  Other Medical Equipment (for information only, NOT a DME order):  walker, bath bench, and hospital bed  Other Treatments: n/a    Patient's personal belongings (please select all that are sent with patient):  Glasses    RN SIGNATURE:  Electronically signed by Agustina Garcia RN on 8/19/22 at 11:51 AM EDT    CASE MANAGEMENT/SOCIAL WORK SECTION    Inpatient Status Date: 08/12/22    Readmission Risk Assessment Score:  Readmission Risk              Risk of Unplanned Readmission:  11           Discharging to Facility/ Agency   Name: [facility]  Address: [address] [place] [address] [address]  [address]  Phone: [phone]  Fax: [phone]    Dialysis Facility (if applicable)   Name:  Address:  Dialysis Schedule:  Phone:  Fax:    / signature: Electronically signed by ZA Moses on 8/19/22 at 9:49 AM EDT    PHYSICIAN SECTION    Prognosis: Good    Condition at Discharge: Stable    Rehab Potential (if transferring to Rehab): Good    Recommended Labs or Other Treatments After Discharge: Percocet/Flexeril for pain control    Physician Certification: I certify the above information and transfer of Naomi Alvarez  is necessary for the continuing treatment of the diagnosis listed and that she requires [facility] for less 30 days.      Update Admission H&P: No change in

## 2022-08-19 NOTE — PROGRESS NOTES
[facility]  INPATIENT PHYSICAL THERAPY  DAILY NOTE  [facility] 7K - 0F-05/195-E    Time In: 9345  Time Out: 1054  Timed Code Treatment Minutes: 23 Minutes  Minutes: 23          Date: 2022  Patient Name: Marco Quiñones,  Gender:  female        MRN: [id number]  : 1950  (67 y.o.)     Referring Practitioner: SAVANNA Orozco  Diagnosis: Spinal stenosis of lumbar region, unspecified whether neurogenic claudication present  Additional Pertinent Hx: The patient is a 68-year-old female with complaints of constant low back pain that radiates pain into the left groin and left anterior thigh and down the left lateral leg to the ankle. Symptoms have been present on a chronic basis and worsening. Current VAS score 8/10. Percentage wise, 50% pain in the back and 50% left leg. Activities that aggravate the pain include standing. Rest helps relieve the pain. Modifying factors include medication management, PT and MARIA DEL CARMEN with really no relief. No bowel or bladder changes. Former smoker. She had a prior back surgery in .  L1-5 Decompression L1-5 Posterior Fusion    on  with dr sanchez     Prior Level of Function:  Lives With: Alone  Type of Home: Trailer  Home Layout: One level  Home Access: Ramped entrance, Stairs to enter with rails  Entrance Stairs - Number of Steps: 4  Entrance Stairs - Rails: Both  Home Equipment: Walker, rolling, Sock aid, Grab bars, Long-handled shoehorn, Reacher   Bathroom Shower/Tub: Walk-in shower  Bathroom Toilet: Handicap height  Bathroom Equipment: Built-in shower seat, Grab bars in shower, Grab bars around toilet, 3-in-1 commode  Bathroom Accessibility: Accessible    ADL Assistance: [address] [place] [address] Avenue: Independent  Homemaking Responsibilities: Yes  Ambulation Assistance: Independent  Transfer Assistance: Independent  Active : Yes    Restrictions/Precautions:  Restrictions/Precautions: Fall Risk, General Precautions  Required Braces or Orthoses  Spinal: Lumbar Corset  Position Activity Restriction  Spinal Precautions: No Bending, No Lifting, No Twisting  Other position/activity restrictions: Lumbar Corset when up     SUBJECTIVE: RN approved session. Patient in bed at arrival and agreeable to therapy. PAIN: 8/10: L thigh     Vitals: Vitals not assessed per clinical judgement, see nursing flowsheet    OBJECTIVE:  Bed Mobility:  Rolling to Right: Contact Guard Assistance, with head of bed raised, with rail, with verbal cues    Supine to Sit: [employer], with head of bed raised, with rail, with verbal cues     Transfers:  Sit to Stand: Stand By Assistance  Stand to Sit:Stand By Assistance    Ambulation:  Stand By Assistance  Distance: 70', 80', 150'   Surface: Level Tile  Device:Rolling Walker  Gait Deviations: Forward Flexed Posture, Slow [place], Decreased Step Length Bilaterally, Decreased Gait Speed, Decreased Heel Strike Bilaterally, Mild Path Deviations, and occasional brief standing breaks due to fatigue. *Patient path finding while completing all ambulation. Stairs:  Contact Guard Assistance  Number of Steps: 4  Height: 6\" step with Bilateral Handrails  *Patient completed with nonreciprocal pattern and safely without cues. Completed slowly with no LOB and good stability. Exercise:  Patient was guided in 1 set(s) 15 reps of exercise to both lower extremities. Ankle pumps, Glut sets, Quad sets, Seated marches, Seated heel/toe raises, and Long arc quads. Exercises were completed for increased independence with functional mobility. Functional Outcome Measures: Completed  AM-PAC Inpatient Mobility Raw Score : 18  AM-PAC Inpatient T-Scale Score : 43.63    ASSESSMENT:  Assessment: Patient progressing toward established goals. Activity Tolerance:  Patient tolerance of  treatment: good.       Equipment Recommendations:Equipment Needed: No  Discharge Recommendations: Continue to assess pending progress, Home with Assist as Needed, Home with Home Health PT, and Patient would benefit from continued PT at discharge  Plan: Current Treatment Recommendations: Strengthening, Balance training, Gait training, Stair training, Functional mobility training  Plan:  (6x O)    Patient Education  Patient Education: Plan of Care    Goals:  Patient goals : none stated  Short Term Goals  Time Frame for Short term goals: by discharge  Short term goal 1: bed mobility with HOB flat, no rails, mod I for increased functional ind  Short term goal 2: sit<>stand from various surfaces with LRD mod I for safe transfers  Short term goal 3: ambulate 200' with LRD mod I for safe household distances  Short term goal 4: navigate 3 steps with LRD mod I for safe enter/exit of home  Long Term Goals  Time Frame for Long term goals : NA d/t short ELOS    Following session, patient left in safe position with all fall risk precautions in place.

## 2022-08-20 NOTE — DISCHARGE SUMMARY
Discharge Summary        Date of Admission: 8/12/22  Date of Discharge: 8/19/22    Admitting Physician: Bertin Diaz MD  Consults: IM, PT/OT     DATE OF PROCEDURE: 8/12/2022     PREOPERATIVE DIAGNOSES:  1. L1-5 degenerative disc disease. 2.  L1-5 lumbar stenosis with neurogenic claudication  3. L1-5 facet arthropathy  4. L1-5 herniated nucleus pulposus     POSTOPERATIVE DIAGNOSES:  1. L1-5 degenerative disc disease. 2.  L1-5 lumbar stenosis with neurogenic claudication  3. L1-5 facet arthropathy  4. L1-5 herniated nucleus pulposus     OPERATION PERFORMED:  1. L1-5 bilateral laminectomy, partial medial facetectomies and foraminotomies of L1, L2, L3, L4 and L5 nerve roots   2. L1-5 posterior spinal fusion. 3.  L1-5 posterior spinal instrumentation, Streamline, SurgAlign instrumentation. 4.  Use of local autograft bone and crushed cancellous chips     SURGEON: Alejandrina Adrian MD     ASSISTANT:  Joaquin Pinedo and Jennyfer Ash PA-C  They assisted throughout the procedure with positioning, draping, retraction, wound closure, and dressing application. ANESTHESIA:  General.     INDICATIONS:  This is a 67 y.o. female with refractory back and left leg pain from degenerative disc disease and lumbar stenosis from L1-5. Patient has failed full conservative therapy including medication management, physical therapy, and epidural steroid injections. Due to the persistence of symptoms and reduction in the ADLs, patient elected surgical treatment. Patient, therefore, understood indications for the surgery as well as its risks, benefits, and alternatives. These risks include but are not limited to paralysis, infection, hematoma, dural tear, nerve root injury, nonunion, DVT/PE, stroke, MI, death etc.  All questions were answered. Informed consent was obtained. HOSPITAL COURSE:   The patient was admitted on the above date had the above procedure performed.  Patient had a durotomy intraoperatively which was tightly repaired by Dr Earl López. The patient was then transferred to the PACU and to a regular nursing floor for postop care. The patient's pain was initially controlled with IV medications, but we were able to transition to oral pain medications soon after arrival to the floor. Their pain remained under good control through their hospital stay. Internal Medicine was consulted for medical management. POD#1 Pt c/o back pain as expected. She was on bedrest following surgery. No HA. Patient had improvement of their radicular symptoms. Drains kept on half compression. POD#2 She was able to sit up without a HA. No new complaints. Drain #1 was kept on half compression and placed no compression on drain #2. POD#3 Pt lying in bed at 30 degrees with no HA during evaluation. She states she got a HA overnight that resolved with morphine administration. Bladder scan was completed with PVR od 519 ml but voided immediately following scan. She c/o left anterior thigh pain, ice pack in place. IV Decadron was started for leg pain. POD#4 Pt lying flat upon evaluation with frontal lobe HA. She stated she normally drinks coffee daily but has not had caffeine while inpatient. Has ambulated daily with PT/OT. NO posterior neck/occipital HA since POD#2. Left anterior thigh pain improved with decadron. POD#5 Pt reported HA while lying flat overnight. She was able to have a bowel movement. Hemovac drain was low enough it could be removed. POD#6 Pt denied HA since drain removal. Continued with activity as tolerated. POD#7 Pt complained of mild left anterolateral thigh N/T. The patient was seen by Physical Therapy and was found to be an ideal candidate for discharge to home with [facility] PT/OT. However, pt preferred ECF for continued assistance until more ambulatory. The patient was then safely discharged to [facility] on the above date. PHYSICAL EXAMINATION ON DISCHARGE:   The patient was afebrile, stable. Dressing was clean, dry and intact.  5/5 strength in bilateral lower extremities. Neurologically intact. DISCHARGE INSTRUCTIONS:   Patient can resume regular diet. Resume home medications except for NSAIDs or blood thinners. Resume asa POD#2 and all other blood thinners POD#5. Okay to resume NSAIDs 6 months after surgery. Take previously prescribed narcotic pain medications as directed. Change the dressing daily until the incision is clean and dry and then leave open to air. Okay to take a shower without submerging the incision. Wear the brace at all times when up and ambulating. Limit any heavy lifting, bending or twisting until first postoperative visit. Patient to follow up with Dr. Eileen Funez 6 weeks post discharge as scheduled.     CONDITION AT DISCHARGE:  Stable    DISPOSITION:   AMADEO Hooker PA-C

## 2022-09-01 ENCOUNTER — CARE COORDINATION (OUTPATIENT)
Dept: CASE MANAGEMENT | Age: 72
End: 2022-09-01

## 2022-09-01 DIAGNOSIS — M48.062 LUMBAR STENOSIS WITH NEUROGENIC CLAUDICATION: Primary | ICD-10-CM

## 2022-09-01 PROCEDURE — 1111F DSCHRG MED/CURRENT MED MERGE: CPT | Performed by: FAMILY MEDICINE

## 2022-09-01 NOTE — CARE COORDINATION
[facility] Discharge Call    2022    Patient: Abbe Sapp Patient : 1950   MRN: [id number]  Reason for Admission: L1 to L5 decompression  Discharge Date: 22 RARS: Readmission Risk Score: 7.3         Discharge Facility: [facility] Course:   Pt to [facility] from  to  for a lumbar decompression of L1 to L5. She then went to [facility] for 12 days with a d/c on  to home with [facility] 22      HFU made:  Had Hfu with Dr Shabana Lam on   F/u with ortho Dr Gen Rowe on     Sig Hx:   HTN, COPD, anxiety, osteo    DME: walker    Conversation:   Spoke with [place] after 2 IDs. She is doing very well. HHC will start today as nurse is coming later today. Her back is doing much better. She can get in and out of bed and on and off toilet. She has a shower chair and will take a shower today with her daughter as a SBA. She saw her PCP last week and will see ortho on  with Dr Gen Rowe. She walks with a walker and there are no loose rugs and there are clear paths. Her toilet has a butt shanae helper to help her clean up better. Her appetite has returned and she does have some Ensure. Her kitchen was stocked with easy to cook items and daughter will help with the occasional perishable. Reviewed meds with pt. Reviewed AVS from hospital. Pt to stop naproxen and tramadol and reviewed appts from AVS. She states buspar stopped and she is taking xanax at night and in morning. She is taking Percocet for calf pain and educated to monitor Bms and take stool softener. Educated to also take flexeril if needed. Agreeable to calls. Follow up plan: Will check into handing off.               Care Transitions Post Acute Facility Transition      Do you have any ongoing symptoms?: No   Do you have all of your prescriptions and are they filled?: Yes   Do you have any questions related to your medications?: No   Have you scheduled your follow up appointment?: Yes   How are you going to get to your appointment?: Car - family or friend to transport   Were you discharged with any Home Care or [facility] or do you currently have any active services?: Yes   Post Acute Services: Home Health (Comment: [facility])         Do you feel like you have everything you need to keep you well at home?: Yes   Care Transitions Interventions         No future appointments. Transitions of Care Initial Call    Was this an external facility discharge? Yes, 22  Discharge Facility: [facility] at [facility] to be reviewed by the provider   Additional needs identified to be addressed with provider: Yes  none             Method of communication with provider : none    Advance Care Planning:   Does patient have an Advance Directive: not on file. Care Transition Nurse contacted the patient by telephone to perform post hospital discharge assessment. Verified name and  with patient as identifiers. Provided introduction to self, and explanation of the CTN role. CTN reviewed discharge instructions, medical action plan and red flags with patient who verbalized understanding. Patient given an opportunity to ask questions and does not have any further questions or concerns at this time. Were discharge instructions available to patient? Yes. Reviewed appropriate site of care based on symptoms and resources available to patient including: PCP. The patient agrees to contact the PCP office for questions related to their healthcare. Medication reconciliation was performed with patient, who verbalizes understanding of administration of home medications. Advised obtaining a 90-day supply of all daily and as-needed medications. Was patient discharged with a pulse oximeter? no    CTN provided contact information. Plan for follow-up call in 5-7 days based on severity of symptoms and risk factors.   Plan for next call: referral to ambulatory care manager-           Yennifer Jerome, BSN, RN   [facility] Nurse  [phone]

## 2022-09-02 ENCOUNTER — CARE COORDINATION (OUTPATIENT)
Dept: CARE COORDINATION | Age: 72
End: 2022-09-02

## 2022-09-02 RX ORDER — ALPRAZOLAM 0.25 MG/1
0.25 TABLET ORAL 2 TIMES DAILY PRN
COMMUNITY

## 2022-09-02 RX ORDER — CYCLOBENZAPRINE HCL 5 MG
5 TABLET ORAL 3 TIMES DAILY PRN
COMMUNITY

## 2022-09-02 RX ORDER — OXYCODONE HYDROCHLORIDE AND ACETAMINOPHEN 5; 325 MG/1; MG/1
1 TABLET ORAL EVERY 6 HOURS PRN
COMMUNITY
End: 2022-10-21

## 2022-09-02 NOTE — CARE COORDINATION
Ambulatory Care Coordination Note  9/2/2022    ACC: Ethan Barraza RN    Summary Note: Ethel Cervantes was referred to care coordination by CTN following recent discharge from [facility] [email or web address]. Spoke with Ethel Cervantes today for f/u. Pt agreeable with f/u calls. Pt has h/o: HTN, anxiety, COPD, OA. Pt is s/p lumbar decompression of L1-L5. Pt receiving [facility] nsg, PT/OT services. [facility] nurse was out to see pt yesterday. PT/OT coming today. Pt lives independently. Daughter lives close by. Pt ambulating with walker and back brace. Reinforced importance of following restrictions: no lifting, pulling, or pushing. On Percocet and Flexeril PRN. Reports pain has been managed with medications. Bowels moving. On Colace. Eating prunes, yogurt, drinking prune juice. Has fiber supplement to start PRN. Has f/u with Dr. Clarice Bell on 9/26  Has bone stimulator. Wearing 2 hrs per day. Denies barriers since d/c home. COPD: not on any inhalers. Breathing at baseline. Anxiety-was on Buspar during hospitalization. Pt stopped medication as it was making her feel too groggy. Taking Xanax PRN. Taking this short term. Plan of care:  Weekly ACM calls  Walk for exercise  Ambulate with walker for safety and wear brace whenever up  Following lifting restrictions  Continue working with [facility] nsg and therapy services  Monitor incision for any s/s of infection.   Reviewed with pt today  Continue using bone stimulator as directed  COPD Assessment              Symptoms:  None: Yes      Symptom course: stable  Increase use of rapid acting/rescue inhaled medications?: Not Applicable  Change in chronic cough?: No/At Baseline  Change in sputum?: No/At Baseline  Self Monitoring - SaO2: No  Have you had a recent diagnosis of pneumonia either by PCP or at a hospital?: No      and   General Assessment    Do you have any symptoms that are causing concern?: Yes  Progression since Onset: Gradually Improving  Reported Symptoms: Other (Comment: s/p lumbar decompression. receiving [facility] services. has brace and walker to use when up.  following lifting restrictions. has bone stimulator)           Ambulatory Care Coordination Assessment    Care Coordination Protocol  Referral from Primary Care Provider: No  Week 1 - Initial Assessment     Do you have all of your prescriptions and are they filled?: Yes  Barriers to medication adherence: None  Are you able to afford your medications?: Yes  How often do you have trouble taking your medications the way you have been told to take them?: I seldom take them as prescribed. Do you have Home O2 Therapy?: No      Ability to seek help/take action for Emergent Urgent situations i.e. fire, crime, inclement weather or health crisis. : Independent  Ability to ambulate to restroom: Independent  Ability handle personal hygeine needs (bathing/dressing/grooming): Needs Assistance  Ability to manage Medications: Independent  Ability to prepare Food Preparation: Independent  Ability to maintain home (clean home, laundry): Independent  Ability to drive and/or has transportation: Needs Assistance  Ability to do shopping: Independent  Ability to manage finances:  Independent  Is patient able to live independently?: Yes     Current Housing: Private Residence        Per the Fall Risk Screening, did the patient have 2 or more falls or 1 fall with injury in the past year?: No     Frequent urination at night?: No  Do you use rails/bars?: No  Do you have a non-slip tub mat?: Yes     Are you experiencing loss of meaning?: No  Are you experiencing loss of hope and peace?: No     Thinking about your patient's physical health needs, are there any symptoms or problems (risk indicators) you are unsure about that require further investigation?: No identified areas of uncertainly or problems already being investigated   Are the patients physical health problems impacting on their mental well-being?: No identified areas of concern   Are daily as needed for Muscle spasms   Yes Historical Provider, MD   oxyCODONE-acetaminophen (PERCOCET) 5-325 MG per tablet Take 1 tablet by mouth every 6 hours as needed for Pain. Yes Historical Provider, MD   ALPRAZolam (XANAX) 0.25 MG tablet Take 0.25 mg by mouth 2 times daily as needed for Anxiety. Yes Historical Provider, MD   vitamin D 25 MCG (1000 UT) CAPS Take by mouth   Yes Historical Provider, MD   famotidine (PEPCID) 20 MG tablet Take 20 mg by mouth daily   Yes Historical Provider, MD   amLODIPine (NORVASC) 5 MG tablet take 1/2 tablet by mouth once daily 9/1/21  Yes Patti Banerjee MD   docusate sodium (COLACE) 100 MG capsule Take 100 mg by mouth daily   Yes Historical Provider, MD   Multiple Vitamins-Minerals (THERAPEUTIC MULTIVITAMIN-MINERALS) tablet Take 1 tablet by mouth daily. Yes Historical Provider, MD   acetaminophen (TYLENOL) 650 MG extended release tablet Take 650 mg by mouth every 8 hours as needed for Pain    Historical Provider, MD   diphenhydrAMINE (BENADRYL) 25 MG tablet Take 25 mg by mouth every 6 hours as needed for Itching or Allergies    Historical Provider, MD       No future appointments.

## 2022-09-08 ENCOUNTER — CARE COORDINATION (OUTPATIENT)
Dept: CARE COORDINATION | Age: 72
End: 2022-09-08

## 2022-09-08 NOTE — CARE COORDINATION
Attempted to reach patient for continued Care Coordination follow up and education. Patient was unavailable at the time of my call, and a generic voicemail message was left asking patient to return my call at [phone].

## 2022-09-13 ENCOUNTER — CARE COORDINATION (OUTPATIENT)
Dept: CARE COORDINATION | Age: 72
End: 2022-09-13

## 2022-09-15 ENCOUNTER — CARE COORDINATION (OUTPATIENT)
Dept: CARE COORDINATION | Age: 72
End: 2022-09-15

## 2022-09-15 ASSESSMENT — ENCOUNTER SYMPTOMS: DYSPNEA ASSOCIATED WITH: EXERTION

## 2022-09-15 NOTE — CARE COORDINATION
Ambulatory Care Coordination Note  9/15/2022    ACC: [employer], RN    Summary Note: pt is being followed by care coordination for education and assistance in managing her chronic conditions and healthcare needs. Pt has h/o: HTN, anxiety, COPD, OA. Pt is s/p lumbar decompression of L1-L5. PT d/c pt today. No longer receiving [facility] services. Pt lives independently. Daughter lives close by. Pt ambulating with walker and back brace. Reinforced importance of following restrictions: no lifting, pulling, or pushing. Taking Percocet PRN pain. Bowels moving without difficulty. Now ambulating with cane. F/u with Ortho-Dr. Allen Horan on 9/26  Has bone stimulator. Wearing 2 hrs per day. COPD: COPD: not on any inhalers. Breathing at baseline. Anxiety-was on Buspar during hospitalization. Pt stopped medication as it was making her feel too groggy. Taking Xanax PRN. Taking this short term. Plan of care: Will f/u with pt after her ortho appt on 9/26  Continue following lifting restrictions. Ambulate with cane for safety when up  Wear brace when up  Monitor incision for any s/s of infection  Continue use of bone stimulator as directed. COPD Assessment    Does the patient have a nebulizer?: No            Symptoms:  None: Yes      Symptom course: stable  Breathlessness: exertion  Increase use of rapid acting/rescue inhaled medications?: No  Change in chronic cough?: No/At Baseline  Change in sputum?: No/At Baseline  Self Monitoring - SaO2: No  Have you had a recent diagnosis of pneumonia either by PCP or at a hospital?: No      and   General Assessment    Do you have any symptoms that are causing concern?: Yes  Progression since Onset: Gradually Improving  Reported Symptoms:  (Comment: s/p lumbar decompression L1-L5.  has f/u with ortho on 9/26. ambulating with cane.   d/c from PT today.)         Lab Results       None            Care Coordination Interventions    Referral from Primary Care Provider: No  Suggested Interventions and [employer] Set or Pill Pack: Declined  Physical Therapy: Completed (Comment: HH)  Zone Management Tools: Completed          Goals Addressed    None         Prior to Admission medications    Medication Sig Start Date End Date Taking? Authorizing Provider   cyclobenzaprine (FLEXERIL) 5 MG tablet Take 5 mg by mouth 3 times daily as needed for Muscle spasms    Historical Provider, MD   oxyCODONE-acetaminophen (PERCOCET) 5-325 MG per tablet Take 1 tablet by mouth every 6 hours as needed for Pain. Historical Provider, MD   ALPRAZolam Tyler Rising) 0.25 MG tablet Take 0.25 mg by mouth 2 times daily as needed for Anxiety. Historical Provider, MD   acetaminophen (TYLENOL) 650 MG extended release tablet Take 650 mg by mouth every 8 hours as needed for Pain    Historical Provider, MD   vitamin D 25 MCG (1000 UT) CAPS Take by mouth    Historical Provider, MD   famotidine (PEPCID) 20 MG tablet Take 20 mg by mouth daily    Historical Provider, MD   amLODIPine (NORVASC) 5 MG tablet take 1/2 tablet by mouth once daily 9/1/21   Nita Castellanos MD   diphenhydrAMINE (BENADRYL) 25 MG tablet Take 25 mg by mouth every 6 hours as needed for Itching or Allergies    Historical Provider, MD   docusate sodium (COLACE) 100 MG capsule Take 100 mg by mouth daily    Historical Provider, MD   Multiple Vitamins-Minerals (THERAPEUTIC MULTIVITAMIN-MINERALS) tablet Take 1 tablet by mouth daily. Historical Provider, MD       No future appointments.

## 2022-09-29 ENCOUNTER — CARE COORDINATION (OUTPATIENT)
Dept: CARE COORDINATION | Age: 72
End: 2022-09-29

## 2022-09-29 SDOH — ECONOMIC STABILITY: TRANSPORTATION INSECURITY
IN THE PAST 12 MONTHS, HAS LACK OF TRANSPORTATION KEPT YOU FROM MEETINGS, WORK, OR FROM GETTING THINGS NEEDED FOR DAILY LIVING?: NO

## 2022-09-29 SDOH — ECONOMIC STABILITY: TRANSPORTATION INSECURITY
IN THE PAST 12 MONTHS, HAS THE LACK OF TRANSPORTATION KEPT YOU FROM MEDICAL APPOINTMENTS OR FROM GETTING MEDICATIONS?: NO

## 2022-09-29 SDOH — ECONOMIC STABILITY: FOOD INSECURITY: WITHIN THE PAST 12 MONTHS, THE FOOD YOU BOUGHT JUST DIDN'T LAST AND YOU DIDN'T HAVE MONEY TO GET MORE.: NEVER TRUE

## 2022-09-29 SDOH — ECONOMIC STABILITY: HOUSING INSECURITY: IN THE LAST 12 MONTHS, HOW MANY PLACES HAVE YOU LIVED?: 1

## 2022-09-29 SDOH — HEALTH STABILITY: PHYSICAL HEALTH: ON AVERAGE, HOW MANY MINUTES DO YOU ENGAGE IN EXERCISE AT THIS LEVEL?: 0 MIN

## 2022-09-29 SDOH — ECONOMIC STABILITY: HOUSING INSECURITY
IN THE LAST 12 MONTHS, WAS THERE A TIME WHEN YOU DID NOT HAVE A STEADY PLACE TO SLEEP OR SLEPT IN A SHELTER (INCLUDING NOW)?: NO

## 2022-09-29 SDOH — HEALTH STABILITY: PHYSICAL HEALTH: ON AVERAGE, HOW MANY DAYS PER WEEK DO YOU ENGAGE IN MODERATE TO STRENUOUS EXERCISE (LIKE A BRISK WALK)?: 0 DAYS

## 2022-09-29 SDOH — ECONOMIC STABILITY: INCOME INSECURITY: IN THE LAST 12 MONTHS, WAS THERE A TIME WHEN YOU WERE NOT ABLE TO PAY THE MORTGAGE OR RENT ON TIME?: NO

## 2022-09-29 SDOH — ECONOMIC STABILITY: FOOD INSECURITY: WITHIN THE PAST 12 MONTHS, YOU WORRIED THAT YOUR FOOD WOULD RUN OUT BEFORE YOU GOT MONEY TO BUY MORE.: NEVER TRUE

## 2022-09-29 ASSESSMENT — SOCIAL DETERMINANTS OF HEALTH (SDOH)
HOW OFTEN DO YOU ATTENT MEETINGS OF THE CLUB OR ORGANIZATION YOU BELONG TO?: NEVER
HOW OFTEN DO YOU ATTEND CHURCH OR RELIGIOUS SERVICES?: NEVER
IN A TYPICAL WEEK, HOW MANY TIMES DO YOU TALK ON THE PHONE WITH FAMILY, FRIENDS, OR NEIGHBORS?: MORE THAN THREE TIMES A WEEK
HOW OFTEN DO YOU GET TOGETHER WITH FRIENDS OR RELATIVES?: MORE THAN THREE TIMES A WEEK
DO YOU BELONG TO ANY CLUBS OR ORGANIZATIONS SUCH AS CHURCH GROUPS UNIONS, FRATERNAL OR ATHLETIC GROUPS, OR SCHOOL GROUPS?: YES
HOW HARD IS IT FOR YOU TO PAY FOR THE VERY BASICS LIKE FOOD, HOUSING, MEDICAL CARE, AND HEATING?: NOT HARD AT ALL

## 2022-09-29 ASSESSMENT — ENCOUNTER SYMPTOMS: DYSPNEA ASSOCIATED WITH: EXERTION

## 2022-09-29 ASSESSMENT — LIFESTYLE VARIABLES
HOW OFTEN DO YOU HAVE A DRINK CONTAINING ALCOHOL: MONTHLY OR LESS
HOW MANY STANDARD DRINKS CONTAINING ALCOHOL DO YOU HAVE ON A TYPICAL DAY: 1 OR 2

## 2022-09-29 NOTE — CARE COORDINATION
Ambulatory Care Coordination Note  9/29/2022    ACC: Luis Alvarado RN    pt is being followed by care coordination for education and assistance in managing her chronic conditions and healthcare needs. Pt has h/o: HTN, anxiety, COPD, OA. Pt is s/p lumbar decompression of L1-L5. Had appt with Dr. Eileen Funez 9/26. Using bone stimulator. Pt discouraged b/c she states that her left hip continues to hurt. Was hopeful that the back surgery would help with this but it hasn't. Pt has appt for evaluation of left hip with Dr. Jennifer Vivas on 10/3. Still taking Percocet PRN. Reports that they will be transitioning her to Ultram.  Denies constipation. Offered patient enrollment in the Remote Patient Monitoring (RPM) program for in-home monitoring: NA. COPD: breathing remains at baseline. No new cough or congestion. Reviewed SDOH. Discussed ACP. Pt has living will for healthcare. Encouraged to provide copy to PCP office. Plan of care:  F/u with Ortho re: hip on 10/3  Continue using bone stimulator as prescribed  Continue with exercises. Be cautious with activity  Provide copy of living will for healthcare to PCP office when able. Call with new concerns  Will f/u with pt following next ortho visit. Will plan of graduating at that time if no new barriers or concerns.    COPD Assessment    Does the patient have a nebulizer?: No            Symptoms:  None: Yes      Symptom course: stable  Breathlessness: exertion  Increase use of rapid acting/rescue inhaled medications?: No  Change in chronic cough?: No/At Baseline  Change in sputum?: No/At Baseline  Self Monitoring - SaO2: No  Have you had a recent diagnosis of pneumonia either by PCP or at a hospital?: No         General Assessment    Do you have any symptoms that are causing concern?: Yes  Progression since Onset: Unchanged  Reported Symptoms: Pain (Comment: left hip.  appt with Dr. Jennifer Vivas on 10/3)           Lab Results       None            Care Coordination Interventions    Referral from Primary Care Provider: No  Suggested Interventions and Community Resources  Medi Set or Pill Pack: Declined  Physical Therapy: Completed (Comment: )  Zone Management Tools: Completed          Goals Addressed                   This Visit's Progress     Conditions and Symptoms   On track     I will schedule office visits, as directed by my provider. I will keep my appointment or reschedule if I have to cancel. I will notify my provider of any barriers to my plan of care. I will follow my Zone Management tool to seek urgent or emergent care. I will notify my provider of any symptoms that indicate a worsening of my condition. Barriers: overwhelmed by complexity of regimen and stress  Plan for overcoming my barriers: education and support from [facility], f/u from [facility], [facility]   Confidence: 9/10  Anticipated Goal Completion Date: 11/2/22                Prior to Admission medications    Medication Sig Start Date End Date Taking? Authorizing Provider   cyclobenzaprine (FLEXERIL) 5 MG tablet Take 5 mg by mouth 3 times daily as needed for Muscle spasms    Historical Provider, MD   oxyCODONE-acetaminophen (PERCOCET) 5-325 MG per tablet Take 1 tablet by mouth every 6 hours as needed for Pain. Historical Provider, MD   ALPRAZolam Devang Paci) 0.25 MG tablet Take 0.25 mg by mouth 2 times daily as needed for Anxiety.     Historical Provider, MD   acetaminophen (TYLENOL) 650 MG extended release tablet Take 650 mg by mouth every 8 hours as needed for Pain    Historical Provider, MD   vitamin D 25 MCG (1000 UT) CAPS Take by mouth    Historical Provider, MD   famotidine (PEPCID) 20 MG tablet Take 20 mg by mouth daily    Historical Provider, MD   amLODIPine (NORVASC) 5 MG tablet take 1/2 tablet by mouth once daily 9/1/21   Jose Recinos MD   diphenhydrAMINE (BENADRYL) 25 MG tablet Take 25 mg by mouth every 6 hours as needed for Itching or Allergies    Historical Provider, MD   docusate sodium (COLACE) 100 MG capsule Take 100 mg by mouth daily    Historical Provider, MD   Multiple Vitamins-Minerals (THERAPEUTIC MULTIVITAMIN-MINERALS) tablet Take 1 tablet by mouth daily. Historical Provider, MD       No future appointments.

## 2022-10-06 ENCOUNTER — CARE COORDINATION (OUTPATIENT)
Dept: CARE COORDINATION | Age: 72
End: 2022-10-06

## 2022-10-06 ASSESSMENT — ENCOUNTER SYMPTOMS: DYSPNEA ASSOCIATED WITH: EXERTION

## 2022-10-06 NOTE — CARE COORDINATION
Ambulatory Care Coordination Note  10/6/2022    ACC: Mehdi Yañez, DYLAN    pt is being followed by care coordination for education and assistance in managing her chronic conditions and healthcare needs. Pt has h/o: HTN, anxiety, COPD, OA. Pt is s/p lumbar decompression of L1-L5. Pt had appt with Dr. Praneeth Gallardo for hip evaluation. Plan is to have THR on 12/16. Pt reports back pain is improving with each day. On Ultram and Tylenol. Taking only on occasion. Pt reports that hip is bothering her more than anything at this time. COPD: pt reports that breathing is at baseline. No cough or congestion. Pt still hesitant with driving. Plans on trying to get out this wknd with her daughter and walk. Plan of care:  Evaluate resource needs with each call  Continue taking pain medication PRN for back  Use bone stimulator for back as instructed  THR scheduled for 12/16. Alternate ice and heat PRN  Reinforce safety precautions  Reinforce COPD zones  General Assessment    Do you have any symptoms that are causing concern?: Yes  Progression since Onset: Unchanged  Reported Symptoms: Other (Comment: hip pain. pt planning on having surgery 12/16)         COPD Assessment    Does the patient have a nebulizer?: No            Symptoms:  None: Yes      Symptom course: stable  Breathlessness: exertion  Increase use of rapid acting/rescue inhaled medications?: No  Change in chronic cough?: No/At Baseline  Change in sputum?: No/At Baseline  Self Monitoring - SaO2: No  Have you had a recent diagnosis of pneumonia either by PCP or at a hospital?: No           Offered patient enrollment in the Remote Patient Monitoring (RPM) program for in-home monitoring: NA.     Lab Results       None            Care Coordination Interventions    Referral from Primary Care Provider: No  Suggested Interventions and Community Resources  Medi Set or Pill Pack: Declined  Physical Therapy: Completed (Comment: HH)  Zone Management Tools: Completed          Goals Addressed                   This Visit's Progress     Conditions and Symptoms   On track     I will schedule office visits, as directed by my provider. I will keep my appointment or reschedule if I have to cancel. I will notify my provider of any barriers to my plan of care. I will follow my Zone Management tool to seek urgent or emergent care. I will notify my provider of any symptoms that indicate a worsening of my condition. Barriers: overwhelmed by complexity of regimen and stress  Plan for overcoming my barriers: education and support from Floresita Zavala, f/u from [facility], [place]   Confidence: 9/10  Anticipated Goal Completion Date: 11/2/22                Prior to Admission medications    Medication Sig Start Date End Date Taking? Authorizing Provider   cyclobenzaprine (FLEXERIL) 5 MG tablet Take 5 mg by mouth 3 times daily as needed for Muscle spasms    Historical Provider, MD   oxyCODONE-acetaminophen (PERCOCET) 5-325 MG per tablet Take 1 tablet by mouth every 6 hours as needed for Pain. Historical Provider, MD   ALPRAZolam Edward Alonzo) 0.25 MG tablet Take 0.25 mg by mouth 2 times daily as needed for Anxiety. Historical Provider, MD   acetaminophen (TYLENOL) 650 MG extended release tablet Take 650 mg by mouth every 8 hours as needed for Pain    Historical Provider, MD   vitamin D 25 MCG (1000 UT) CAPS Take by mouth    Historical Provider, MD   famotidine (PEPCID) 20 MG tablet Take 20 mg by mouth daily    Historical Provider, MD   amLODIPine (NORVASC) 5 MG tablet take 1/2 tablet by mouth once daily 9/1/21   Ibis Mcdonald MD   diphenhydrAMINE (BENADRYL) 25 MG tablet Take 25 mg by mouth every 6 hours as needed for Itching or Allergies    Historical Provider, MD   docusate sodium (COLACE) 100 MG capsule Take 100 mg by mouth daily    Historical Provider, MD   Multiple Vitamins-Minerals (THERAPEUTIC MULTIVITAMIN-MINERALS) tablet Take 1 tablet by mouth daily.     Historical Provider, MD       No future appointments.

## 2022-10-20 ENCOUNTER — CARE COORDINATION (OUTPATIENT)
Dept: CARE COORDINATION | Age: 72
End: 2022-10-20

## 2022-10-20 NOTE — CARE COORDINATION
Ambulatory Care Coordination Note  10/20/2022    ACC: Carmela Beard    I spoke with the patient for continued Care Coordination follow up and education. Patient states she is doing good. Plan is to have THR on 12/16. Pt reports back pain is improving with each day. On Ultram and Tylenol. Taking only on occasion. Pt reports that hip is bothering her more than anything at this time. Patient does use bone simulator every day. Breathing is at baseline. Denies coughing or congestion. We discussed Zone management tools for chronic disease(s) and patient denies current questions re: s/sx at this time. Educated on how to identify sx's that are worse than the baseline and the importance of early symptom recognition and reporting to prevent exacerbation which may lead to ED visits and hospital admissions. I advised patient to contact PCP office if needed. No further needs at this time. Lab Results       None            Care Coordination Interventions    Referral from Primary Care Provider: No  Suggested Interventions and Community Resources  Medi Set or Pill Pack: Declined  Physical Therapy: Completed (Comment: HH)  Zone Management Tools: Completed          Goals Addressed    None         Prior to Admission medications    Medication Sig Start Date End Date Taking? Authorizing Provider   cyclobenzaprine (FLEXERIL) 5 MG tablet Take 5 mg by mouth 3 times daily as needed for Muscle spasms    Historical Provider, MD   oxyCODONE-acetaminophen (PERCOCET) 5-325 MG per tablet Take 1 tablet by mouth every 6 hours as needed for Pain. Historical Provider, MD   ALPRAZolam Guzman Solan) 0.25 MG tablet Take 0.25 mg by mouth 2 times daily as needed for Anxiety.     Historical Provider, MD   acetaminophen (TYLENOL) 650 MG extended release tablet Take 650 mg by mouth every 8 hours as needed for Pain    Historical Provider, MD   vitamin D 25 MCG (1000 UT) CAPS Take by mouth    Historical Provider, MD   famotidine (PEPCID) 20 MG tablet Take 20 mg by mouth daily    Historical Provider, MD   amLODIPine (NORVASC) 5 MG tablet take 1/2 tablet by mouth once daily 9/1/21   Ibis Mcdonald MD   diphenhydrAMINE (BENADRYL) 25 MG tablet Take 25 mg by mouth every 6 hours as needed for Itching or Allergies    Historical Provider, MD   docusate sodium (COLACE) 100 MG capsule Take 100 mg by mouth daily    Historical Provider, MD   Multiple Vitamins-Minerals (THERAPEUTIC MULTIVITAMIN-MINERALS) tablet Take 1 tablet by mouth daily.     Historical Provider, MD       Future Appointments   Date Time Provider Jim Ellison   10/21/2022  1:45 PM Ibis Mcdonald  [facility]

## 2022-11-08 ENCOUNTER — CARE COORDINATION (OUTPATIENT)
Dept: CARE COORDINATION | Age: 72
End: 2022-11-08

## 2022-11-08 ASSESSMENT — ENCOUNTER SYMPTOMS: DYSPNEA ASSOCIATED WITH: EXERTION

## 2022-11-08 NOTE — CARE COORDINATION
Ambulatory Care Coordination Note  11/8/2022    ACC: Taqueria Quiñones, RN    Summary Note: pt is being followed by care coordination for education and assistance in managing her chronic conditions and healthcare needs. Pt has h/o: HTN, anxiety, COPD, OA. Spoke with Lai Bergeron today for f/u. Pt reports that she is feeling good overall. Had post of f/u with surgeon regarding back yesterday. Reports that she was told everything is healing well. Continues to use bone stimulator. Ambulating without any assistive device. Scheduled to have THR 12/16. Plans on returning home with [facility] services after surgery. Daughter will be available to help. COPD: breathing reamins at baseline. Aware of COPD zones. Plan of care:  Pt doing well overall. No new concerns. No new barriers identified. Pt aware of resources. Has this [facility]'s contact number to reach out with any new concerns in future. Will graduate from care coordination at this time. Offered patient enrollment in the Remote Patient Monitoring (RPM) program for in-home monitoring: NA. Lab Results       None            Care Coordination Interventions    Referral from Primary Care Provider: No  Suggested Interventions and Community Resources  Medi Set or Pill Pack: Declined  Physical Therapy: Completed (Comment: HH)  Zone Management Tools: Completed          Goals Addressed                   This Visit's Progress     COMPLETED: Conditions and Symptoms   On track     I will schedule office visits, as directed by my provider. I will keep my appointment or reschedule if I have to cancel. I will notify my provider of any barriers to my plan of care. I will follow my Zone Management tool to seek urgent or emergent care. I will notify my provider of any symptoms that indicate a worsening of my condition.     Barriers: overwhelmed by complexity of regimen and stress  Plan for overcoming my barriers: education and support from [facility], f/u from [employer], [facility] Confidence: 9/10  Anticipated Goal Completion Date: 11/2/22                Prior to Admission medications    Medication Sig Start Date End Date Taking? Authorizing Provider   traMADol (ULTRAM) 50 MG tablet Take 50 mg by mouth every 8 hours as needed for Pain. Historical Provider, MD   amLODIPine (NORVASC) 2.5 MG tablet Take 1 tablet by mouth daily 10/21/22   Jada Lance MD   cyclobenzaprine (FLEXERIL) 5 MG tablet Take 5 mg by mouth 3 times daily as needed for Muscle spasms    Historical Provider, MD   ALPRAZolam (XANAX) 0.25 MG tablet Take 0.25 mg by mouth 2 times daily as needed for Anxiety. Historical Provider, MD   acetaminophen (TYLENOL) 650 MG extended release tablet Take 650 mg by mouth every 8 hours as needed for Pain    Historical Provider, MD   vitamin D 25 MCG (1000 UT) CAPS Take by mouth    Historical Provider, MD   famotidine (PEPCID) 20 MG tablet Take 20 mg by mouth daily    Historical Provider, MD   diphenhydrAMINE (BENADRYL) 25 MG tablet Take 25 mg by mouth every 6 hours as needed for Itching or Allergies    Historical Provider, MD   docusate sodium (COLACE) 100 MG capsule Take 100 mg by mouth daily    Historical Provider, MD   Multiple Vitamins-Minerals (THERAPEUTIC MULTIVITAMIN-MINERALS) tablet Take 1 tablet by mouth daily.     Historical Provider, MD       Future Appointments   Date Time Provider Jim Ellison   12/6/2022 10:45 AM Jada Lance  [facility]

## 2022-11-21 ENCOUNTER — HOSPITAL ENCOUNTER (OUTPATIENT)
Age: 72
Discharge: HOME | End: 2022-11-21
Payer: MEDICARE

## 2022-11-21 DIAGNOSIS — Z13.6 SCREENING FOR ISCHEMIC HEART DISEASE: ICD-10-CM

## 2022-11-21 DIAGNOSIS — D64.9 ANEMIA, UNSPECIFIED TYPE: ICD-10-CM

## 2022-11-21 DIAGNOSIS — Z51.81 MEDICATION MONITORING ENCOUNTER: ICD-10-CM

## 2022-11-21 LAB
BASOPHILS # BLD: 0.7 %
BASOPHILS ABSOLUTE: 0 THOU/MM3 (ref 0–0.1)
EOSINOPHIL # BLD: 4.7 %
EOSINOPHILS ABSOLUTE: 0.3 THOU/MM3 (ref 0–0.4)
ERYTHROCYTE [DISTWIDTH] IN BLOOD BY AUTOMATED COUNT: 14.6 % (ref 11.5–14.5)
ERYTHROCYTE [DISTWIDTH] IN BLOOD BY AUTOMATED COUNT: 46.8 FL (ref 35–45)
HCT VFR BLD CALC: 45 % (ref 37–47)
HEMOGLOBIN: 15 GM/DL (ref 12–16)
IMMATURE GRANS (ABS): 0.02 THOU/MM3 (ref 0–0.07)
IMMATURE GRANULOCYTES: 0.3 %
LYMPHOCYTES # BLD: 19.1 %
LYMPHOCYTES ABSOLUTE: 1.1 THOU/MM3 (ref 1–4.8)
MCH RBC QN AUTO: 29.6 PG (ref 26–33)
MCHC RBC AUTO-ENTMCNC: 33.3 GM/DL (ref 32.2–35.5)
MCV RBC AUTO: 88.8 FL (ref 81–99)
MONOCYTES # BLD: 10.2 %
MONOCYTES ABSOLUTE: 0.6 THOU/MM3 (ref 0.4–1.3)
NUCLEATED RED BLOOD CELLS: 0 /100 WBC
PLATELET # BLD: 432 THOU/MM3 (ref 130–400)
PMV BLD AUTO: 9.4 FL (ref 9.4–12.4)
RBC # BLD: 5.07 MILL/MM3 (ref 4.2–5.4)
SEG NEUTROPHILS: 65 %
SEGMENTED NEUTROPHILS ABSOLUTE COUNT: 3.8 THOU/MM3 (ref 1.8–7.7)
WBC # BLD: 5.9 THOU/MM3 (ref 4.8–10.8)

## 2022-11-21 PROCEDURE — 80053 COMPREHEN METABOLIC PANEL: CPT

## 2022-11-21 PROCEDURE — 81001 URINALYSIS AUTO W/SCOPE: CPT

## 2022-11-21 PROCEDURE — 36415 COLL VENOUS BLD VENIPUNCTURE: CPT

## 2022-11-21 PROCEDURE — 82746 ASSAY OF FOLIC ACID SERUM: CPT

## 2022-11-21 PROCEDURE — 82248 BILIRUBIN DIRECT: CPT

## 2022-11-21 PROCEDURE — 83540 ASSAY OF IRON: CPT

## 2022-11-21 PROCEDURE — 85025 COMPLETE CBC W/AUTO DIFF WBC: CPT

## 2022-11-21 PROCEDURE — 87641 MR-STAPH DNA AMP PROBE: CPT

## 2022-11-21 PROCEDURE — 80061 LIPID PANEL: CPT

## 2022-11-21 PROCEDURE — 82607 VITAMIN B-12: CPT

## 2022-11-22 LAB
ALBUMIN SERPL-MCNC: 4.3 G/DL (ref 3.5–5.1)
ALP BLD-CCNC: 108 U/L (ref 38–126)
ALT SERPL-CCNC: 16 U/L (ref 11–66)
ANION GAP SERPL CALCULATED.3IONS-SCNC: 11 MEQ/L (ref 8–16)
AST SERPL-CCNC: 20 U/L (ref 5–40)
BACTERIA: NORMAL /HPF
BILIRUB SERPL-MCNC: 0.2 MG/DL (ref 0.3–1.2)
BILIRUBIN DIRECT: < 0.2 MG/DL (ref 0–0.3)
BILIRUBIN URINE: NEGATIVE
BLOOD, URINE: NEGATIVE
BUN BLDV-MCNC: 9 MG/DL (ref 7–22)
CALCIUM SERPL-MCNC: 9.7 MG/DL (ref 8.5–10.5)
CASTS 2: PRESENT /LPF
CASTS UA: NORMAL /LPF
CHARACTER, URINE: NORMAL
CHLORIDE BLD-SCNC: 106 MEQ/L (ref 98–111)
CHOLESTEROL, TOTAL: 159 MG/DL (ref 100–199)
CO2: 23 MEQ/L (ref 23–33)
COLOR: YELLOW
CREAT SERPL-MCNC: 0.5 MG/DL (ref 0.4–1.2)
CRYSTALS, UA: NORMAL
EPITHELIAL CELLS, UA: NORMAL /HPF
FOLATE: > 20 NG/ML (ref 4.8–24.2)
GFR SERPL CREATININE-BSD FRML MDRD: > 60 ML/MIN/1.73M2
GLUCOSE BLD-MCNC: 100 MG/DL (ref 70–108)
GLUCOSE URINE: NEGATIVE MG/DL
HDLC SERPL-MCNC: 56 MG/DL
IRON: 60 UG/DL (ref 50–170)
KETONES, URINE: NEGATIVE
LDL CHOLESTEROL CALCULATED: 79 MG/DL
LEUKOCYTE ESTERASE, URINE: NEGATIVE
MISCELLANEOUS 2: NORMAL
MRSA SCREEN RT-PCR: NEGATIVE
NITRITE, URINE: NEGATIVE
PH UA: 6 (ref 5–9)
POTASSIUM SERPL-SCNC: 4.6 MEQ/L (ref 3.5–5.2)
PROTEIN UA: NEGATIVE
RBC URINE: NORMAL /HPF
RENAL EPITHELIAL, UA: NORMAL
SODIUM BLD-SCNC: 140 MEQ/L (ref 135–145)
SPECIFIC GRAVITY, URINE: 1.01 (ref 1–1.03)
TOTAL PROTEIN: 6.9 G/DL (ref 6.1–8)
TRIGL SERPL-MCNC: 120 MG/DL (ref 0–199)
UROBILINOGEN, URINE: 0.2 EU/DL (ref 0–1)
VITAMIN B-12: 887 PG/ML (ref 211–911)
WBC UA: NORMAL /HPF
YEAST: NORMAL

## 2022-12-06 ENCOUNTER — HOSPITAL ENCOUNTER (OUTPATIENT)
Age: 72
Discharge: HOME | End: 2022-12-06
Payer: MEDICARE

## 2022-12-06 LAB
EKG ATRIAL RATE: 98 BPM
EKG P AXIS: 57 DEGREES
EKG P-R INTERVAL: 126 MS
EKG Q-T INTERVAL: 320 MS
EKG QRS DURATION: 66 MS
EKG QTC CALCULATION (BAZETT): 408 MS
EKG R AXIS: 71 DEGREES
EKG T AXIS: 71 DEGREES
EKG VENTRICULAR RATE: 98 BPM

## 2022-12-06 PROCEDURE — 93010 ELECTROCARDIOGRAM REPORT: CPT | Performed by: INTERNAL MEDICINE

## 2022-12-06 PROCEDURE — 93005 ELECTROCARDIOGRAM TRACING: CPT | Performed by: FAMILY MEDICINE

## 2022-12-27 ENCOUNTER — ANESTHESIA EVENT (OUTPATIENT)
Dept: ENDOSCOPY | Age: 72
End: 2022-12-27
Payer: MEDICARE

## 2022-12-27 ENCOUNTER — APPOINTMENT (OUTPATIENT)
Dept: CT IMAGING | Age: 72
End: 2022-12-27
Payer: MEDICARE

## 2022-12-27 ENCOUNTER — ANESTHESIA (OUTPATIENT)
Dept: OPERATING ROOM | Age: 72
End: 2022-12-27
Payer: MEDICARE

## 2022-12-27 ENCOUNTER — APPOINTMENT (OUTPATIENT)
Dept: MRI IMAGING | Age: 72
End: 2022-12-27
Payer: MEDICARE

## 2022-12-27 ENCOUNTER — APPOINTMENT (OUTPATIENT)
Dept: GENERAL RADIOLOGY | Age: 72
End: 2022-12-27
Payer: MEDICARE

## 2022-12-27 ENCOUNTER — ANESTHESIA (OUTPATIENT)
Dept: ENDOSCOPY | Age: 72
End: 2022-12-27
Payer: MEDICARE

## 2022-12-27 ENCOUNTER — ANESTHESIA EVENT (OUTPATIENT)
Dept: OPERATING ROOM | Age: 72
End: 2022-12-27
Payer: MEDICARE

## 2022-12-27 ENCOUNTER — HOSPITAL ENCOUNTER (INPATIENT)
Age: 72
LOS: 3 days | Discharge: HOME HEALTH | End: 2022-12-30
Attending: SURGERY | Admitting: SURGERY
Payer: MEDICARE

## 2022-12-27 DIAGNOSIS — R10.11 RIGHT UPPER QUADRANT ABDOMINAL PAIN: Primary | ICD-10-CM

## 2022-12-27 DIAGNOSIS — D72.829 LEUKOCYTOSIS, UNSPECIFIED TYPE: ICD-10-CM

## 2022-12-27 DIAGNOSIS — A41.9 SEPSIS, DUE TO UNSPECIFIED ORGANISM, UNSPECIFIED WHETHER ACUTE ORGAN DYSFUNCTION PRESENT (HCC): ICD-10-CM

## 2022-12-27 DIAGNOSIS — I10 PRIMARY HYPERTENSION: ICD-10-CM

## 2022-12-27 DIAGNOSIS — Z90.49 S/P LAPAROSCOPIC CHOLECYSTECTOMY: ICD-10-CM

## 2022-12-27 DIAGNOSIS — R10.84 GENERALIZED ABDOMINAL PAIN: ICD-10-CM

## 2022-12-27 DIAGNOSIS — K81.0 ACUTE CHOLECYSTITIS: ICD-10-CM

## 2022-12-27 LAB
ALBUMIN SERPL-MCNC: 3.3 G/DL (ref 3.5–5.1)
ALP BLD-CCNC: 195 U/L (ref 38–126)
ALT SERPL-CCNC: 27 U/L (ref 11–66)
AMORPHOUS: ABNORMAL
ANION GAP SERPL CALCULATED.3IONS-SCNC: 14 MEQ/L (ref 8–16)
AST SERPL-CCNC: 48 U/L (ref 5–40)
BACTERIA: ABNORMAL /HPF
BASOPHILS # BLD: 0.2 %
BASOPHILS ABSOLUTE: 0.1 THOU/MM3 (ref 0–0.1)
BILIRUB SERPL-MCNC: 0.5 MG/DL (ref 0.3–1.2)
BILIRUBIN DIRECT: < 0.2 MG/DL (ref 0–0.3)
BILIRUBIN URINE: NEGATIVE
BLOOD, URINE: ABNORMAL
BUN BLDV-MCNC: 18 MG/DL (ref 7–22)
CALCIUM SERPL-MCNC: 8 MG/DL (ref 8.5–10.5)
CASTS UA: ABNORMAL /LPF
CHARACTER, URINE: CLEAR
CHLORIDE BLD-SCNC: 89 MEQ/L (ref 98–111)
CO2: 26 MEQ/L (ref 23–33)
COLOR: YELLOW
CREAT SERPL-MCNC: 0.8 MG/DL (ref 0.4–1.2)
CRYSTALS, UA: ABNORMAL
DIFFERENTIAL TYPE: ABNORMAL
EKG ATRIAL RATE: 116 BPM
EKG P AXIS: 63 DEGREES
EKG P-R INTERVAL: 144 MS
EKG Q-T INTERVAL: 318 MS
EKG QRS DURATION: 64 MS
EKG QTC CALCULATION (BAZETT): 442 MS
EKG R AXIS: 69 DEGREES
EKG T AXIS: 69 DEGREES
EKG VENTRICULAR RATE: 116 BPM
EOSINOPHIL # BLD: 0.1 %
EOSINOPHILS ABSOLUTE: 0 THOU/MM3 (ref 0–0.4)
EPITHELIAL CELLS, UA: ABNORMAL /HPF
ERYTHROCYTE [DISTWIDTH] IN BLOOD BY AUTOMATED COUNT: 15.2 % (ref 11.5–14.5)
ERYTHROCYTE [DISTWIDTH] IN BLOOD BY AUTOMATED COUNT: 46.9 FL (ref 35–45)
GFR SERPL CREATININE-BSD FRML MDRD: > 60 ML/MIN/1.73M2
GLUCOSE BLD-MCNC: 82 MG/DL (ref 70–108)
GLUCOSE URINE: NEGATIVE MG/DL
HCT VFR BLD CALC: 34.7 % (ref 37–47)
HEMOGLOBIN: 12.1 GM/DL (ref 12–16)
IMMATURE GRANS (ABS): 2.1 THOU/MM3 (ref 0–0.07)
IMMATURE GRANULOCYTES: 7 %
KETONES, URINE: NEGATIVE
LACTIC ACID: 1 MMOL/L (ref 0.5–2)
LACTIC ACID: 1.2 MMOL/L (ref 0.5–2)
LEUKOCYTE ESTERASE, URINE: ABNORMAL
LIPASE: 16.7 U/L (ref 5.6–51.3)
LYMPHOCYTES # BLD: 2.7 %
LYMPHOCYTES ABSOLUTE: 0.8 THOU/MM3 (ref 1–4.8)
MCH RBC QN AUTO: 29.6 PG (ref 26–33)
MCHC RBC AUTO-ENTMCNC: 34.9 GM/DL (ref 32.2–35.5)
MCV RBC AUTO: 84.8 FL (ref 81–99)
MONOCYTES # BLD: 1.7 %
MONOCYTES ABSOLUTE: 0.5 THOU/MM3 (ref 0.4–1.3)
NITRITE, URINE: POSITIVE
NUCLEATED RED BLOOD CELLS: 0 /100 WBC
OSMOLALITY CALCULATION: 259.9 MOSMOL/KG (ref 275–300)
PATHOLOGIST REVIEW: ABNORMAL
PH UA: 6 (ref 5–9)
PLATELET # BLD: 395 THOU/MM3 (ref 130–400)
PMV BLD AUTO: 9 FL (ref 9.4–12.4)
POTASSIUM SERPL-SCNC: 4.2 MEQ/L (ref 3.5–5.2)
PROCALCITONIN: > 100 NG/ML (ref 0.01–0.09)
PROTEIN UA: NEGATIVE
RBC # BLD: 4.09 MILL/MM3 (ref 4.2–5.4)
RBC URINE: ABNORMAL /HPF
RENAL EPITHELIAL, UA: ABNORMAL
SCAN OF BLOOD SMEAR: NORMAL
SEG NEUTROPHILS: 88.3 %
SEGMENTED NEUTROPHILS ABSOLUTE COUNT: 26.7 THOU/MM3 (ref 1.8–7.7)
SODIUM BLD-SCNC: 129 MEQ/L (ref 135–145)
SPECIFIC GRAVITY, URINE: 1.01 (ref 1–1.03)
TOTAL PROTEIN: 5.8 G/DL (ref 6.1–8)
TOXIC GRANULATION: PRESENT
UROBILINOGEN, URINE: 0.2 EU/DL (ref 0–1)
WBC # BLD: 30.2 THOU/MM3 (ref 4.8–10.8)
WBC UA: ABNORMAL /HPF
YEAST: ABNORMAL

## 2022-12-27 PROCEDURE — 36415 COLL VENOUS BLD VENIPUNCTURE: CPT

## 2022-12-27 PROCEDURE — C1769 GUIDE WIRE: HCPCS | Performed by: INTERNAL MEDICINE

## 2022-12-27 PROCEDURE — 2580000003 HC RX 258: Performed by: SURGERY

## 2022-12-27 PROCEDURE — 2500000003 HC RX 250 WO HCPCS: Performed by: NURSE ANESTHETIST, CERTIFIED REGISTERED

## 2022-12-27 PROCEDURE — 3700000000 HC ANESTHESIA ATTENDED CARE: Performed by: INTERNAL MEDICINE

## 2022-12-27 PROCEDURE — 2709999900 HC NON-CHARGEABLE SUPPLY: Performed by: INTERNAL MEDICINE

## 2022-12-27 PROCEDURE — 83690 ASSAY OF LIPASE: CPT

## 2022-12-27 PROCEDURE — 1200000003 HC TELEMETRY R&B

## 2022-12-27 PROCEDURE — 84145 PROCALCITONIN (PCT): CPT

## 2022-12-27 PROCEDURE — 87077 CULTURE AEROBIC IDENTIFY: CPT

## 2022-12-27 PROCEDURE — 87186 SC STD MICRODIL/AGAR DIL: CPT

## 2022-12-27 PROCEDURE — 6360000002 HC RX W HCPCS: Performed by: SURGERY

## 2022-12-27 PROCEDURE — 74177 CT ABD & PELVIS W/CONTRAST: CPT

## 2022-12-27 PROCEDURE — 83605 ASSAY OF LACTIC ACID: CPT

## 2022-12-27 PROCEDURE — 82248 BILIRUBIN DIRECT: CPT

## 2022-12-27 PROCEDURE — 80053 COMPREHEN METABOLIC PANEL: CPT

## 2022-12-27 PROCEDURE — 85025 COMPLETE CBC W/AUTO DIFF WBC: CPT

## 2022-12-27 PROCEDURE — 99285 EMERGENCY DEPT VISIT HI MDM: CPT

## 2022-12-27 PROCEDURE — 96367 TX/PROPH/DG ADDL SEQ IV INF: CPT

## 2022-12-27 PROCEDURE — 3700000001 HC ADD 15 MINUTES (ANESTHESIA): Performed by: INTERNAL MEDICINE

## 2022-12-27 PROCEDURE — 96361 HYDRATE IV INFUSION ADD-ON: CPT

## 2022-12-27 PROCEDURE — 6360000002 HC RX W HCPCS: Performed by: NURSE PRACTITIONER

## 2022-12-27 PROCEDURE — 93005 ELECTROCARDIOGRAM TRACING: CPT | Performed by: SURGERY

## 2022-12-27 PROCEDURE — 87801 DETECT AGNT MULT DNA AMPLI: CPT

## 2022-12-27 PROCEDURE — 96375 TX/PRO/DX INJ NEW DRUG ADDON: CPT

## 2022-12-27 PROCEDURE — 6360000002 HC RX W HCPCS: Performed by: NURSE ANESTHETIST, CERTIFIED REGISTERED

## 2022-12-27 PROCEDURE — 7100000001 HC PACU RECOVERY - ADDTL 15 MIN: Performed by: INTERNAL MEDICINE

## 2022-12-27 PROCEDURE — BF141ZZ FLUOROSCOPY OF GALLBLADDER, BILE DUCTS AND PANCREATIC DUCTS USING LOW OSMOLAR CONTRAST: ICD-10-PCS | Performed by: INTERNAL MEDICINE

## 2022-12-27 PROCEDURE — 71275 CT ANGIOGRAPHY CHEST: CPT

## 2022-12-27 PROCEDURE — 96376 TX/PRO/DX INJ SAME DRUG ADON: CPT

## 2022-12-27 PROCEDURE — 6370000000 HC RX 637 (ALT 250 FOR IP): Performed by: NURSE PRACTITIONER

## 2022-12-27 PROCEDURE — 7100000000 HC PACU RECOVERY - FIRST 15 MIN: Performed by: INTERNAL MEDICINE

## 2022-12-27 PROCEDURE — 2580000003 HC RX 258: Performed by: NURSE PRACTITIONER

## 2022-12-27 PROCEDURE — 3609018800 HC ERCP DX COLLECTION SPECIMEN BRUSHING/WASHING: Performed by: INTERNAL MEDICINE

## 2022-12-27 PROCEDURE — 74328 X-RAY BILE DUCT ENDOSCOPY: CPT

## 2022-12-27 PROCEDURE — 2720000010 HC SURG SUPPLY STERILE: Performed by: INTERNAL MEDICINE

## 2022-12-27 PROCEDURE — 87040 BLOOD CULTURE FOR BACTERIA: CPT

## 2022-12-27 PROCEDURE — 6360000004 HC RX CONTRAST MEDICATION: Performed by: NURSE PRACTITIONER

## 2022-12-27 PROCEDURE — G1010 CDSM STANSON: HCPCS

## 2022-12-27 PROCEDURE — 96365 THER/PROPH/DIAG IV INF INIT: CPT

## 2022-12-27 PROCEDURE — 81001 URINALYSIS AUTO W/SCOPE: CPT

## 2022-12-27 PROCEDURE — A9579 GAD-BASE MR CONTRAST NOS,1ML: HCPCS | Performed by: NURSE PRACTITIONER

## 2022-12-27 RX ORDER — LIDOCAINE HYDROCHLORIDE 20 MG/ML
INJECTION, SOLUTION EPIDURAL; INFILTRATION; INTRACAUDAL; PERINEURAL PRN
Status: DISCONTINUED | OUTPATIENT
Start: 2022-12-27 | End: 2022-12-27 | Stop reason: SDUPTHER

## 2022-12-27 RX ORDER — OMEPRAZOLE 20 MG/1
20 CAPSULE, DELAYED RELEASE ORAL DAILY
COMMUNITY

## 2022-12-27 RX ORDER — ALPRAZOLAM 0.25 MG/1
0.25 TABLET ORAL 2 TIMES DAILY PRN
Status: DISCONTINUED | OUTPATIENT
Start: 2022-12-27 | End: 2022-12-30 | Stop reason: HOSPADM

## 2022-12-27 RX ORDER — DEXAMETHASONE SODIUM PHOSPHATE 4 MG/ML
INJECTION, SOLUTION INTRA-ARTICULAR; INTRALESIONAL; INTRAMUSCULAR; INTRAVENOUS; SOFT TISSUE PRN
Status: DISCONTINUED | OUTPATIENT
Start: 2022-12-27 | End: 2022-12-27 | Stop reason: SDUPTHER

## 2022-12-27 RX ORDER — FENTANYL CITRATE 50 UG/ML
INJECTION, SOLUTION INTRAMUSCULAR; INTRAVENOUS PRN
Status: DISCONTINUED | OUTPATIENT
Start: 2022-12-27 | End: 2022-12-27 | Stop reason: SDUPTHER

## 2022-12-27 RX ORDER — 0.9 % SODIUM CHLORIDE 0.9 %
1000 INTRAVENOUS SOLUTION INTRAVENOUS ONCE
Status: COMPLETED | OUTPATIENT
Start: 2022-12-27 | End: 2022-12-27

## 2022-12-27 RX ORDER — ROCURONIUM BROMIDE 10 MG/ML
INJECTION, SOLUTION INTRAVENOUS PRN
Status: DISCONTINUED | OUTPATIENT
Start: 2022-12-27 | End: 2022-12-27 | Stop reason: SDUPTHER

## 2022-12-27 RX ORDER — AMLODIPINE BESYLATE 2.5 MG/1
2.5 TABLET ORAL DAILY
Status: DISCONTINUED | OUTPATIENT
Start: 2022-12-27 | End: 2022-12-30 | Stop reason: HOSPADM

## 2022-12-27 RX ORDER — 0.9 % SODIUM CHLORIDE 0.9 %
500 INTRAVENOUS SOLUTION INTRAVENOUS ONCE
Status: COMPLETED | OUTPATIENT
Start: 2022-12-27 | End: 2022-12-27

## 2022-12-27 RX ORDER — ONDANSETRON 2 MG/ML
INJECTION INTRAMUSCULAR; INTRAVENOUS PRN
Status: DISCONTINUED | OUTPATIENT
Start: 2022-12-27 | End: 2022-12-27 | Stop reason: SDUPTHER

## 2022-12-27 RX ORDER — ONDANSETRON 2 MG/ML
4 INJECTION INTRAMUSCULAR; INTRAVENOUS EVERY 6 HOURS PRN
Status: DISCONTINUED | OUTPATIENT
Start: 2022-12-27 | End: 2022-12-28 | Stop reason: SDUPTHER

## 2022-12-27 RX ORDER — ASPIRIN 325 MG
325 TABLET ORAL DAILY
COMMUNITY

## 2022-12-27 RX ORDER — SODIUM CHLORIDE 0.9 % (FLUSH) 0.9 %
5-40 SYRINGE (ML) INJECTION EVERY 12 HOURS SCHEDULED
Status: DISCONTINUED | OUTPATIENT
Start: 2022-12-27 | End: 2022-12-30 | Stop reason: HOSPADM

## 2022-12-27 RX ORDER — SODIUM CHLORIDE 9 MG/ML
INJECTION, SOLUTION INTRAVENOUS PRN
Status: DISCONTINUED | OUTPATIENT
Start: 2022-12-27 | End: 2022-12-30 | Stop reason: HOSPADM

## 2022-12-27 RX ORDER — FENTANYL CITRATE 50 UG/ML
50 INJECTION, SOLUTION INTRAMUSCULAR; INTRAVENOUS ONCE
Status: COMPLETED | OUTPATIENT
Start: 2022-12-27 | End: 2022-12-27

## 2022-12-27 RX ORDER — SODIUM CHLORIDE 0.9 % (FLUSH) 0.9 %
5-40 SYRINGE (ML) INJECTION PRN
Status: DISCONTINUED | OUTPATIENT
Start: 2022-12-27 | End: 2022-12-30 | Stop reason: HOSPADM

## 2022-12-27 RX ORDER — HYDROCODONE BITARTRATE AND ACETAMINOPHEN 5; 325 MG/1; MG/1
2 TABLET ORAL EVERY 4 HOURS PRN
Status: DISCONTINUED | OUTPATIENT
Start: 2022-12-27 | End: 2022-12-28 | Stop reason: SDUPTHER

## 2022-12-27 RX ORDER — SODIUM CHLORIDE 9 MG/ML
INJECTION, SOLUTION INTRAVENOUS CONTINUOUS
Status: DISCONTINUED | OUTPATIENT
Start: 2022-12-27 | End: 2022-12-29

## 2022-12-27 RX ORDER — PROPOFOL 10 MG/ML
INJECTION, EMULSION INTRAVENOUS PRN
Status: DISCONTINUED | OUTPATIENT
Start: 2022-12-27 | End: 2022-12-27 | Stop reason: SDUPTHER

## 2022-12-27 RX ORDER — ONDANSETRON 4 MG/1
4 TABLET, ORALLY DISINTEGRATING ORAL EVERY 8 HOURS PRN
Status: DISCONTINUED | OUTPATIENT
Start: 2022-12-27 | End: 2022-12-28 | Stop reason: SDUPTHER

## 2022-12-27 RX ORDER — HYDROCODONE BITARTRATE AND ACETAMINOPHEN 5; 325 MG/1; MG/1
1 TABLET ORAL EVERY 4 HOURS PRN
Status: DISCONTINUED | OUTPATIENT
Start: 2022-12-27 | End: 2022-12-28 | Stop reason: SDUPTHER

## 2022-12-27 RX ORDER — ACETAMINOPHEN 325 MG/1
650 TABLET ORAL EVERY 8 HOURS PRN
Status: DISCONTINUED | OUTPATIENT
Start: 2022-12-27 | End: 2022-12-30 | Stop reason: HOSPADM

## 2022-12-27 RX ORDER — ONDANSETRON 2 MG/ML
4 INJECTION INTRAMUSCULAR; INTRAVENOUS ONCE
Status: COMPLETED | OUTPATIENT
Start: 2022-12-27 | End: 2022-12-27

## 2022-12-27 RX ADMIN — FENTANYL CITRATE 50 MCG: 50 INJECTION, SOLUTION INTRAMUSCULAR; INTRAVENOUS at 03:39

## 2022-12-27 RX ADMIN — HYDROMORPHONE HYDROCHLORIDE 0.5 MG: 1 INJECTION, SOLUTION INTRAMUSCULAR; INTRAVENOUS; SUBCUTANEOUS at 13:34

## 2022-12-27 RX ADMIN — PROPOFOL 150 MG: 10 INJECTION, EMULSION INTRAVENOUS at 15:09

## 2022-12-27 RX ADMIN — SODIUM CHLORIDE, PRESERVATIVE FREE 10 ML: 5 INJECTION INTRAVENOUS at 19:59

## 2022-12-27 RX ADMIN — SODIUM CHLORIDE: 9 INJECTION, SOLUTION INTRAVENOUS at 19:59

## 2022-12-27 RX ADMIN — ONDANSETRON 4 MG: 2 INJECTION INTRAMUSCULAR; INTRAVENOUS at 03:38

## 2022-12-27 RX ADMIN — GADOTERIDOL 15 ML: 279.3 INJECTION, SOLUTION INTRAVENOUS at 09:38

## 2022-12-27 RX ADMIN — AMLODIPINE BESYLATE 2.5 MG: 2.5 TABLET ORAL at 17:23

## 2022-12-27 RX ADMIN — PIPERACILLIN AND TAZOBACTAM 3375 MG: 3; .375 INJECTION, POWDER, FOR SOLUTION INTRAVENOUS at 06:46

## 2022-12-27 RX ADMIN — SODIUM CHLORIDE: 9 INJECTION, SOLUTION INTRAVENOUS at 10:10

## 2022-12-27 RX ADMIN — INDOMETHACIN 100 MG: 50 SUPPOSITORY RECTAL at 14:37

## 2022-12-27 RX ADMIN — PHENYLEPHRINE HYDROCHLORIDE 200 MCG: 10 INJECTION INTRAVENOUS at 15:17

## 2022-12-27 RX ADMIN — IOPAMIDOL 80 ML: 755 INJECTION, SOLUTION INTRAVENOUS at 04:42

## 2022-12-27 RX ADMIN — ONDANSETRON 4 MG: 2 INJECTION INTRAMUSCULAR; INTRAVENOUS at 15:36

## 2022-12-27 RX ADMIN — PHENYLEPHRINE HYDROCHLORIDE 200 MCG: 10 INJECTION INTRAVENOUS at 15:16

## 2022-12-27 RX ADMIN — PHENYLEPHRINE HYDROCHLORIDE 100 MCG: 10 INJECTION INTRAVENOUS at 15:28

## 2022-12-27 RX ADMIN — HYDROCODONE BITARTRATE AND ACETAMINOPHEN 1 TABLET: 5; 325 TABLET ORAL at 17:22

## 2022-12-27 RX ADMIN — FENTANYL CITRATE 50 MCG: 50 INJECTION, SOLUTION INTRAMUSCULAR; INTRAVENOUS at 04:34

## 2022-12-27 RX ADMIN — PHENYLEPHRINE HYDROCHLORIDE 200 MCG: 10 INJECTION INTRAVENOUS at 15:45

## 2022-12-27 RX ADMIN — ROCURONIUM BROMIDE 40 MG: 10 INJECTION, SOLUTION INTRAVENOUS at 15:09

## 2022-12-27 RX ADMIN — HYDROMORPHONE HYDROCHLORIDE 0.5 MG: 1 INJECTION, SOLUTION INTRAMUSCULAR; INTRAVENOUS; SUBCUTANEOUS at 06:02

## 2022-12-27 RX ADMIN — SUGAMMADEX 200 MG: 100 INJECTION, SOLUTION INTRAVENOUS at 15:41

## 2022-12-27 RX ADMIN — DEXAMETHASONE SODIUM PHOSPHATE 8 MG: 4 INJECTION, SOLUTION INTRAMUSCULAR; INTRAVENOUS at 15:32

## 2022-12-27 RX ADMIN — SODIUM CHLORIDE 1000 ML: 9 INJECTION, SOLUTION INTRAVENOUS at 03:38

## 2022-12-27 RX ADMIN — HYDROMORPHONE HYDROCHLORIDE 0.5 MG: 1 INJECTION, SOLUTION INTRAMUSCULAR; INTRAVENOUS; SUBCUTANEOUS at 10:22

## 2022-12-27 RX ADMIN — SODIUM CHLORIDE: 9 INJECTION, SOLUTION INTRAVENOUS at 15:28

## 2022-12-27 RX ADMIN — PIPERACILLIN AND TAZOBACTAM 3375 MG: 3; .375 INJECTION, POWDER, FOR SOLUTION INTRAVENOUS at 20:07

## 2022-12-27 RX ADMIN — PHENYLEPHRINE HYDROCHLORIDE 200 MCG: 10 INJECTION INTRAVENOUS at 15:34

## 2022-12-27 RX ADMIN — FENTANYL CITRATE 100 MCG: 50 INJECTION, SOLUTION INTRAMUSCULAR; INTRAVENOUS at 15:09

## 2022-12-27 RX ADMIN — SODIUM CHLORIDE 500 ML: 9 INJECTION, SOLUTION INTRAVENOUS at 06:01

## 2022-12-27 RX ADMIN — VANCOMYCIN HYDROCHLORIDE 1500 MG: 5 INJECTION, POWDER, LYOPHILIZED, FOR SOLUTION INTRAVENOUS at 07:22

## 2022-12-27 RX ADMIN — PHENYLEPHRINE HYDROCHLORIDE 100 MCG: 10 INJECTION INTRAVENOUS at 15:36

## 2022-12-27 RX ADMIN — PIPERACILLIN AND TAZOBACTAM 3375 MG: 3; .375 INJECTION, POWDER, FOR SOLUTION INTRAVENOUS at 12:35

## 2022-12-27 RX ADMIN — LIDOCAINE HYDROCHLORIDE 80 MG: 20 INJECTION, SOLUTION EPIDURAL; INFILTRATION; INTRACAUDAL; PERINEURAL at 15:09

## 2022-12-27 ASSESSMENT — PAIN SCALES - GENERAL
PAINLEVEL_OUTOF10: 5
PAINLEVEL_OUTOF10: 3
PAINLEVEL_OUTOF10: 8
PAINLEVEL_OUTOF10: 8
PAINLEVEL_OUTOF10: 9
PAINLEVEL_OUTOF10: 9
PAINLEVEL_OUTOF10: 4
PAINLEVEL_OUTOF10: 5
PAINLEVEL_OUTOF10: 8

## 2022-12-27 ASSESSMENT — PAIN DESCRIPTION - LOCATION
LOCATION: ABDOMEN

## 2022-12-27 ASSESSMENT — PAIN - FUNCTIONAL ASSESSMENT
PAIN_FUNCTIONAL_ASSESSMENT: 0-10

## 2022-12-27 ASSESSMENT — PAIN DESCRIPTION - DESCRIPTORS: DESCRIPTORS: ACHING

## 2022-12-27 ASSESSMENT — COPD QUESTIONNAIRES: CAT_SEVERITY: MILD

## 2022-12-27 NOTE — H&P
[address] [address]  General Surgery History and Physical  Dr. Dottie Avalos    Pt Name: Judythe Hamman  MRN: [id number]  YOB: 1950  Date of evaluation: 12/27/2022  Primary Care Physician: Chantel Rodriguez MD  Patient evaluated at the request of  STACY Bonilla-CNP  Reason for evaluation: Acute cholecystitis   IMPRESSIONS:   Acute calculous cholecystitis  4 mm calculus within common hepatic duct with sludge within CBD  Sepsis secondary to #1  UTI  Recent total hip replacement 12/12/22  COPD  does not have any pertinent problems on file. PLANS:   Admit type: inpatient  It is expected this patient's LOS will be: Greater than 2 midnights  Anticipated Disposition Upon Discharge: Home  NPO   Informed consent  Stat MRCP prior to OR to evaluate. Demonstrates dilated CBD with sludge and 4 mm calculus within common hepatic duct. Await urine culture results  Analgesics and antiemetics on a prn basis  IV hydration   Empiric antibiotic coverage  DVT prophylaxis with SCD's. Hold 325 mg aspirin. Home meds as ordered   LFTs okay. Trend WBC. Imaging reviewed and discussed findings with patient/family. Consulting GI for ERCP recommendations. If unable to do ERCP today then proceeding with cholecystectomy today and ERCP to follow. Awaiting call back from GI. Pros and cons of surgical intervention discussed with patient. Surgical risks and possible complications discussed. Patient agreeable to proceed. SUBJECTIVE:   Chief Complaint:     History of Present Illness:  Mine Hernandez is a 70-year old female patient who presents to the ER for abdominal pain and diarrhea. Patient recently had a left hip replacement on 12/12 at [facility]. States Saturday she ate some soup and started with periumbilical pain and nausea//vomiting that evening. Patient also had constipation so she took MOM which then gave her diarrhea. Patient states after Saturday night pain migrated to epigastric/RUQ. Pain is sharp and constant.  No aggravating or alleviating factors. Some nausea but no further vomiting since Saturday. Still having diarrhea. Last BM yesterday. Appetite decreased since Saturday. No melena or hematochezia. Associated chills and malaise. No significant urinary complaints. No fevers that she knows of. No SOB or chest pain. Last Norco was yesterday. Colonoscopy with Dr. Talbot Brought 2-3 years ago. Never had an EGD. Takes aspirin 325 mg daily. No other [address]. Doing PT/OT for hip replacement. Has follow up at [facility] thursday. Past Medical History      Diagnosis Date    Anxiety     Arthritis     Cancer ([address] 1978    CERVICAL CANCER    COPD (chronic obstructive pulmonary disease) ([address] 05/24/2021    Endometrial cancer (HCC)     cervical CA at age 32    Female bladder prolapse     with rectocele    Hypertension      Past Surgical History      Procedure Laterality Date    BACK SURGERY  1976    COLONOSCOPY      HYSTERECTOMY (CERVIX STATUS UNKNOWN)  1978    JOINT REPLACEMENT Left 05/18/2017    Lt total shoulder , Dr Naty Yusuf N/A 8/12/2022    L1-5 Decompression L1-5 Posterior Fusion performed by Kapil Montanez MD at [address] [place] [address] Right 02/2018     Medications  Prior to Admission medications    Medication Sig Start Date End Date Taking? Authorizing Provider   traMADol (ULTRAM) 50 MG tablet Take 50 mg by mouth every 8 hours as needed for Pain. Historical Provider, MD   amLODIPine (NORVASC) 2.5 MG tablet Take 1 tablet by mouth daily 10/21/22   Patti Banerjee MD   cyclobenzaprine (FLEXERIL) 5 MG tablet Take 5 mg by mouth 3 times daily as needed for Muscle spasms  Patient not taking: Reported on 12/6/2022    Historical Provider, MD   ALPRAZolam [place] [address]) 0.25 MG tablet Take 0.25 mg by mouth 2 times daily as needed for Anxiety.     Historical Provider, MD   acetaminophen (TYLENOL) 650 MG extended release tablet Take 650 mg by mouth every 8 hours as needed for Pain    Historical Provider, MD   vitamin D 25 MCG (1000 UT) CAPS Take by mouth    Historical Provider, MD   famotidine (PEPCID) 20 MG tablet Take 20 mg by mouth daily    Historical Provider, MD   diphenhydrAMINE (BENADRYL) 25 MG tablet Take 25 mg by mouth every 6 hours as needed for Itching or Allergies    Historical Provider, MD   docusate sodium (COLACE) 100 MG capsule Take 100 mg by mouth daily    Historical Provider, MD   Multiple Vitamins-Minerals (THERAPEUTIC MULTIVITAMIN-MINERALS) tablet Take 1 tablet by mouth daily. Historical Provider, MD    Scheduled Meds:   vancomycin  20 mg/kg IntraVENous Once     Continuous Infusions:  PRN Meds:. Allergies  is allergic to codeine. Family History  family history includes Breast Cancer (age of onset: 46) in her maternal aunt; Breast Cancer (age of onset: 62) in her maternal aunt; COPD in her mother; Emphysema in her mother; Other in her father. Social History   reports that she quit smoking about 14 years ago. Her smoking use included cigarettes. She started smoking about 53 years ago. She has a 22.00 pack-year smoking history. She has never used smokeless tobacco. She reports current alcohol use of about 4.0 standard drinks per week. She reports that she does not use drugs. Review of Systems:  General positive for  chills, sweats, fatigue, and malaise. No significant unexpected weight change. HEENT Denies any diplopia, tinnitus or vertigo. No chronic headaches. Resp Denies any shortness of breath, cough or wheezing  Cardiac Denies any chest pain, palpitations, claudication or edema  GI Positive for diarrhea, nausea, vomiting, and abdominal pain. Denies any melena, hematochezia, hematemesis or pyrosis   Denies any frequency, urgency, hesitancy or incontinence  Heme Denies bruising or bleeding easily  Endocrine Denies any history of diabetes or thyroid disease  Neuro Denies any focal motor or sensory deficits  Musculoskeletal  Denies osteoarthritis. No gout. Recent left hip replacement.   Psychiatric Denies any severe depression or agitation. No panic attacks. No suicidal ideation. OBJECTIVE:   CURRENT VITALS:  weight is 160 lb (72.6 kg). Her temperature is 99.5 °F (37.5 °C). Her blood pressure is 107/63 and her pulse is 111 (abnormal). Her respiration is 19 and oxygen saturation is 99%. Temperature Range (24h):Temp: 99.5 °F (37.5 °C) Temp  Av.3 °F (37.4 °C)  Min: 99.1 °F (37.3 °C)  Max: 99.5 °F (37.5 °C)  BP Range (91W): Systolic (17UFS), JC , Min:97 , KII:297     Diastolic (53DZD), FXR:22, Min:58, Max:73    Pulse Range (24h): Pulse  Av.7  Min: 111  Max: 128  Respiration Range (24h): Resp  Av.9  Min: 18  Max: 22  Current Pulse Ox (24h):  SpO2: 99 %  Pulse Ox Range (24h):  SpO2  Av.9 %  Min: 92 %  Max: 99 %  Oxygen Amount and Delivery: O2 Flow Rate (L/min): 2 L/min  CONSTITUTIONAL: Alert and oriented times 3, no acute distress and cooperative to examination with proper mood and affect. SKIN: Skin color, texture, turgor normal. No rashes or lesions. Left hip incision well intact without signs of infection. LYMPH: no cervical nodes, no inguinal nodes  HEENT: Head is normocephalic, atraumatic. NECK: Supple, symmetrical, trachea midline  CHEST/LUNGS:  normal respiratory rate and rhythm, lungs clear to auscultation without wheezes, rales or rhonchi. CARDIOVASCULAR: Heart sounds are normal. Tachycardia with rhythm without murmur, gallop or rub. Normal S1 and S2.  ABDOMEN: Normal shape. Scar(s) present. Normal bowel sounds. soft, nondistended, no masses or organomegaly. Tenderness: RUQ with mild guarding. NEUROLOGIC: There are no focalizing motor or sensory deficits. CN II-XII are grossly intact. EXTREMITIES: no cyanosis, no clubbing, and no edema.   LABS:     Recent Labs     22  0315 22  0619 22  0729   WBC 30.2*  --   --    HGB 12.1  --   --    HCT 34.7*  --   --      --   --    *  --   --    K 4.2  --   --    CL 89*  --   --    CO2 26  --   -- BUN 18  --   --    CREATININE 0.8  --   --    CALCIUM 8.0*  --   --    AST 48*  --   --    ALT 27  --   --    BILITOT 0.5  --   --    BILIDIR <0.2  --   --    LIPASE 16.7  --   --    LACTA  --   --  1.2   NITRU  --  POSITIVE*  --    COLORU  --  YELLOW  --    BACTERIA  --  MANY  --      RADIOLOGY:   I have personally reviewed the following films:    PROCEDURE: MRI ABDOMEN W WO CONTRAST MRCP       CLINICAL INFORMATION: evaluate pancreatic duct prior to surgery, CT with possibility of obstruction in the region of the ampulla       COMPARISON: CT abdomen and pelvis 12/27/2022       TECHNIQUE: Multiplanar and multisequence MRI abdomen without and with IV contrast.  Routine MRCP was also performed. The MRCP examination includes 3D reconstructions of the biliary tree and the pancreatic duct. CONTRAST: 15 mL of ProHance  intravenously. FINDINGS:        LOWER THORAX: Plate like atelectasis and/or scarring seen in the visualized lung bases. A small hiatal hernia is present. HEPATOBILIARY: Fatty infiltration of the liver. Tiny cysts are seen in the liver. Moderate hepatomegaly. Unremarkable hepatic surface morphology. The gallbladder is distended. Gallbladder wall thickening is seen. Sludge is present gallbladder. There is a    1.7 cm stone at the gallbladder neck. The common bile duct is dilated 1.2 cm. Sludge is present within the common bile duct. There is a 4 mm calculus in the common hepatic duct. PANCREAS AND SPLEEN: Mild atrophy of the pancreas. Mild pancreatic duct dilatation. No peripancreatic abnormality. The spleen is not enlarged       RETROPERITONEUM: A 1.1 cm nonenhancing cyst is seen in the superior pole of the left kidney. The kidneys are otherwise unremarkable. The adrenal glands are not enlarged       BOWEL AND PERITONEUM: Very small ascites. No bowel obstruction or acute inflammatory bowel process. Trula Blew        VASCULAR: The abdominal aorta is not aneurysmal.       LYMPH NODES: No significantly enlarged lymph nodes are seen. BONES: No aggressive bony lesions noted. Decompressive laminectomy has been performed at L1-L5. Pedicle screw and julia fixation noted. Scoliosis. Impression   1. Gallbladder wall thickening with pericholecystic inflammation, sludge, gallstone in the gallbladder neck and gallbladder distention. Findings relate to acute cholecystitis. 2. Sludge is present within the common bile duct. A 4 mm calculus is seen within the common hepatic duct. 3. The common bile duct is dilated at 1.2 cm. 4. Mild pancreatic duct dilatation. 5. Hepatic steatosis. **This report has been created using voice recognition software. It may contain minor errors which are inherent in voice recognition technology. **       Final report electronically signed by Dr Ingrid Little on 12/27/2022 10:14 AM     EXAM: CT OF ABDOMEN/PELVIS WITH CONTRAST:       COMPARISON:   None       TECHNIQUE: Enteric contrast was not administered. Contiguous axial images    from lung bases through ischial tuberosities during uneventful intravenous    administration of iodine contrast. Coronal and sagittal reformatted images    were performed and reviewed. FINDINGS:       The gallbladder is distended marked wall thickening. Intrahepatic and    extrahepatic duct dilatation is present. The common bile duct measures up    to 11 mm diameter. No radiopaque stone is identified within the    gallbladder or within the ducts. No liver or spleen lesion identified. Several small calcific granulomas of    the spleen are incidentally noted. No acute pancreatic abnormality is identified. There is a prominent    duodenal diverticulum which projects into the pancreatic tissue of the    head, and there is pancreatic duct dilatation measuring at least 6 mm. No    obvious pancreatic head mass lesion is appreciated. The adrenals and kidneys are normal appearing.    No renal stone or urinary tract obstruction is identified. Atherosclerosis of aorta noted. No aneurysm. IVC and portal veins    unremarkable. No adenopathy or ascites is identified. Stomach and small bowel structures are unremarkable. Appendix not visualized, no secondary findings of appendicitis. A few sigmoid diverticuli are present. No evidence of diverticulitis. Apparent previous hysterectomy. Urinary bladder unremarkable. No acute osseous abnormality. Multilevel laminectomies and pedicle screw    fusion from the L1 level through the L5 level. Previous hip arthroplasties bilaterally. Impression   1. Pronounced gallbladder and bile duct distention and marked wall    thickening of the gallbladder. Acute cholecystitis is suspected. Cholelithiasis is not specifically demonstrated on this study, but may be    present. Additionally, the pancreatic duct is dilated, raising the    possibility of obstruction in the region of the ampulla. No pancreatic    head lesion is appreciated. 2. Additional findings: Atherosclerosis, diverticuli of the sigmoid    region, prior hysterectomy, previous osseous surgical changes. This document has been electronically signed by: Roberto Peña MD    on 12/27/2022 07:57 AM       All CTs at this facility use dose modulation techniques and iterative    reconstructions, and/or weight-based dosing   when appropriate to reduce radiation to a low as reasonably achievable. EXAM: CT OF CHEST WITH CONTRAST, CT PULMONARY ANGIOGRAM:       COMPARISON:   None       TECHNIQUE: Contiguous axial images from lung apices through bases during    uneventful intravenous administration of iodinated contrast, CT pulmonary    angiogram technique. Conventional and MIP reformatted images were also    reviewed. FINDINGS:       The pulmonary arteries are well-opacified and show no evidence of filling    defect.  No pulmonary thromboembolus is identified within pulmonary    arterial branches of either lung. The lungs are clear. No infiltrate, consolidation, atelectasis or mass is    appreciated. No pleural or pericardial effusion is evident. Heart size is normal. Small calcified right hilar and mediastinal lymph    nodes are consistent with old granulomatous exposure no noncalcified    adenopathy. Central airways are unremarkable. No significant abnormality of the aorta is appreciated. No evidence of    aneurysm or dissection. No significant bone lesion is identified. Impression   No acute abnormality. No evidence of pulmonary embolus. Old granulomatous    exposure noted. This document has been electronically signed by: Dino Ayon MD    on 12/27/2022 07:52 AM       All CTs at this facility use dose modulation techniques and iterative    reconstructions, and/or weight-based dosing   when appropriate to reduce radiation to a low as reasonably achievable. 3D Post-processing was performed on this study. Electronically signed by STACY Rodriges CNP on 12/27/2022 at 8:20 AM          Patient seen and examined independently by me 12/27/2022     I personally supervised the PA/NP in the evaluation, management and development of the treatment plan for [facility] MORALES Bianchi  on the same date of service as above. I personally interviewed Lexie Lozano   and  discussed his review of symptoms as able due to the patient's condition, as well as performed an individual physical exam on the same   date of service as above. In addition I discussed the patient's condition and treatment options with the patient, if able, and/or designated family if available. I have also reviewed and agree with the past medical,  family and social history updates as well as care plans unless otherwise noted below. All questions were answered.       I examined independently and reviewed relevant data myself and may have done so in the context of team rounds. A full chart review was performed by me. I attest that this medical record entry accurately reflects signatures and notations that I made in my capacity as an M. D. when I treated and diagnosed Mel Sheehan on the date of service above     I was responsible for all medical decision making involving this encounter. I identified and/or confirmed all problems associated with this patient encounter by my own direct physical examination of this patient and review of all radiology studies and labwork  that were ordered and available. Active Hospital Problems    Diagnosis     Acute acalculous cholecystitis [K81.0]      Priority: Medium        I  discussed the management of all of the identified problems with the APN or PA. I formulated the treatment plan for all identified problems and discussed those with the APN or PA . This management plan was then carried out and the patient's orders for care by the APN or PA. Total time personally spent on this patient encounter was 55 minutes which includes :  Preparing to see the patient( reviewing tests and chart)  Obtaining and reviewing separately obtained history  Performing a medically appropriate examination and evaluation  Ordering medications, tests, or procedures  Counseling and educating the patient/family/caregiver  Care coordination  Referring and communicating with other healthcare professionals  Documenting clinical information in the EHR  Independent interpretation of results and communicating the results to patient and care team  This includes a direct physical exam as well as all the other encounter activities described above. Time may be discontiguous. Time does not include procedures. Please see our orders that were directed and approved by me if there are any new ones for the updated patient care plan.     Above discussed and I agree with documentation and orders placed by Miko Crocker    See any additional comments if needed below for any other updated orders and plans. -- NPO. IV fluid hydration. IV antibiotics. MRCP. Possible GI service need pending MRI results. Pain and nausea control. SCDs for DVT prophylaxis. Risks, benefits and alternatives discussed in detail with patient. Robotic, laparoscopic and open techniques discussed. Patient in agreement and wishes to proceed with cholecystectomy. Await MRI results prior to surgery. Consent.     Electronically signed by Sabrina Hardin MD on 12/27/22 at 11:18 AM EST

## 2022-12-27 NOTE — PROGRESS NOTES
admitted to endoscopy for an ERCP. Procedure verified and consent signed. Pt noted to have open sore on L upper buttocks upon admission to endoscopy.

## 2022-12-27 NOTE — PROGRESS NOTES
ERCP completed, pt tolerated well. Conray dye lot #[id number]. Expiration date April 2025. Instilled 30 mL. Wasted 20 mL. Sphincterotomy completed. Balloon sweep completed. 9-12 mm retrival balloon used. Grounding pad removed. Skin intact. Fluro pictures taken. Scope  used.

## 2022-12-27 NOTE — ED NOTES
Patient resting in bed. Respirations easy and unlabored. No distress noted. Call light within reach.        Abbey Curtis RN  12/27/22 2729

## 2022-12-27 NOTE — PROGRESS NOTES
1556 Patient to PACU, alert to verbal stimuli. Resp easy and unlabored, on 4 L NC.      1605 Patient resting with eyes closed, resp easy and unlabored. VSS    1615 Patient resting in bed with eyes closed, resp easy and unlabored. Denies pain. VSS.    1620 Report called to [facility].      4140 Patient meets criteria for discharge from PACU at this time. 1630 Patient resting with eyes closed, resp easy and unlabored. VSS. Continues to deny pain.       [id number] Patient transported to  in stable condition

## 2022-12-27 NOTE — PLAN OF CARE
Problem: Discharge Planning  Goal: Discharge to home or other facility with appropriate resources  12/27/2022 1630 by Angeles Thomas RN  Outcome: Progressing  Flowsheets (Taken 12/27/2022 1630)  Discharge to home or other facility with appropriate resources: Identify barriers to discharge with patient and caregiver  12/27/2022 1630 by Angeles Thomas RN  Outcome: Progressing  Flowsheets (Taken 12/27/2022 1630)  Discharge to home or other facility with appropriate resources: Identify barriers to discharge with patient and caregiver     Problem: Pain  Goal: Verbalizes/displays adequate comfort level or baseline comfort level  12/27/2022 1630 by Angeles Thomas RN  Outcome: Progressing  Flowsheets (Taken 12/27/2022 1630)  Verbalizes/displays adequate comfort level or baseline comfort level:   Encourage patient to monitor pain and request assistance   Assess pain using appropriate pain scale   Administer analgesics based on type and severity of pain and evaluate response  12/27/2022 1630 by Angeles Thomas RN  Outcome: Progressing  Flowsheets (Taken 12/27/2022 1630)  Verbalizes/displays adequate comfort level or baseline comfort level:   Encourage patient to monitor pain and request assistance   Assess pain using appropriate pain scale   Administer analgesics based on type and severity of pain and evaluate response     Problem: Skin/Tissue Integrity  Goal: Absence of new skin breakdown  Description: 1. Monitor for areas of redness and/or skin breakdown  2. Assess vascular access sites hourly  3. Every 4-6 hours minimum:  Change oxygen saturation probe site  4. Every 4-6 hours:  If on nasal continuous positive airway pressure, respiratory therapy assess nares and determine need for appliance change or resting period.   12/27/2022 1630 by Angeles Thomas RN  Outcome: Progressing  12/27/2022 1630 by Angeles Thomas RN  Outcome: Progressing     Problem: Safety - Adult  Goal: Free from fall injury  12/27/2022 1630 by Angeles Thomas RN  Outcome: Progressing  Flowsheets (Taken 12/27/2022 1630)  Free From Fall Injury: Instruct family/caregiver on patient safety  12/27/2022 1630 by Lennox Ruder, RN  Outcome: Progressing  4 H Roman Goyal (Taken 12/27/2022 1630)  Free From Fall Injury: Instruct family/caregiver on patient safety     Problem: ABCDS Injury Assessment  Goal: Absence of physical injury  12/27/2022 1630 by Lennox Ruder, RN  Outcome: Progressing  12/27/2022 1630 by Lennox Ruder, RN  Outcome: Progressing     Problem: Gastrointestinal - Adult  Goal: Minimal or absence of nausea and vomiting  12/27/2022 1630 by Lennox Ruder, RN  Outcome: Progressing  Flowsheets (Taken 12/27/2022 1630)  Minimal or absence of nausea and vomiting: Administer IV fluids as ordered to ensure adequate hydration  12/27/2022 1630 by Lennox Ruder, RN  Outcome: Progressing  Flowsheets (Taken 12/27/2022 1630)  Minimal or absence of nausea and vomiting: Administer IV fluids as ordered to ensure adequate hydration  Goal: Maintains adequate nutritional intake  12/27/2022 1630 by Lennox Ruder, RN  Outcome: Progressing  12/27/2022 1630 by Lennox Ruder, RN  Outcome: Progressing

## 2022-12-27 NOTE — CONSULTS
Consult History & Physical      Patient:  Raoul Fish  YOB: 1950  MRN: [id number]     Acct: [de-identified]    Chief Complaint:  Common hepatic duct stone    Date of Service: Pt seen/examined in consultation on 12/27/2022    History Of Present Illness:      67 y.o. female who we are asked to see/evaluate by Taran Gates MD for medical management of common hepatic duct stone. She came to ED overnight for RUQ pain, nausea, and vomiting that started Saturday. Initially the pain was intermittently, but now it is continuous. Certain movements aggravates the pain, she denies alleviating factors. Denies hematemesis. She says today her nausea is better. She endorses chills with low grade fever at home. Afebrile since admission. Initial WBC 30.2. Noted to have elevated alk phos of 195, elevated AST 48. She initially had constipation, take OTC MOM which relieved the constipation and caused some diarrhea. Denies melena and hematochezia. CT A/P demonstrated pronounced gallbladder & bile duct distention & marked wall thickening of the gallbladder, acute cholecystitis suspected, pancreatic duct dilated. MRCP demonstrated gallbladder wall thickening with pericholecystic inflammation, sludge, gallstone in the gallbladder neck & gallbladder distention, findings related to acute cholecystitis, sludge present within the common bile duct, 4 mm calculus seen within the common hepatic duct, common bile duct dilated at 1.2 cm, mild pancreatic duct dilatation, hepatic steatosis. She recently had a left hip replacement 12/12/22 for which she has bee no  mg daily, but has not taken her medications for a couple days due to her symptoms.     Past Medical History:    Past Medical History:   Diagnosis Date    Anxiety     Arthritis     Cancer ([address] 1978    CERVICAL CANCER    COPD (chronic obstructive pulmonary disease) ([facility] [address] 05/24/2021    Endometrial cancer (HCC)     cervical CA at age 32    Female bladder prolapse     with rectocele    Hypertension        Home Medications:  Prior to Admission medications    Medication Sig Start Date End Date Taking? Authorizing Provider   aspirin 325 MG tablet Take 325 mg by mouth daily   Yes Historical Provider, MD   omeprazole (PRILOSEC) 20 MG delayed release capsule Take 20 mg by mouth daily   Yes Historical Provider, MD   traMADol (ULTRAM) 50 MG tablet Take 50 mg by mouth every 8 hours as needed for Pain. Patient not taking: Reported on 12/27/2022    Historical Provider, MD   amLODIPine (NORVASC) 2.5 MG tablet Take 1 tablet by mouth daily 10/21/22   Jose Recinos MD   cyclobenzaprine (FLEXERIL) 5 MG tablet Take 5 mg by mouth 3 times daily as needed for Muscle spasms  Patient not taking: Reported on 12/6/2022    Historical Provider, MD   ALPRAZolam Devang Paci) 0.25 MG tablet Take 0.25 mg by mouth 2 times daily as needed for Anxiety. Historical Provider, MD   acetaminophen (TYLENOL) 650 MG extended release tablet Take 650 mg by mouth every 8 hours as needed for Pain    Historical Provider, MD   vitamin D 25 MCG (1000 UT) CAPS Take by mouth    Historical Provider, MD   famotidine (PEPCID) 20 MG tablet Take 20 mg by mouth daily    Historical Provider, MD   diphenhydrAMINE (BENADRYL) 25 MG tablet Take 25 mg by mouth every 6 hours as needed for Itching or Allergies    Historical Provider, MD   docusate sodium (COLACE) 100 MG capsule Take 100 mg by mouth daily    Historical Provider, MD   Multiple Vitamins-Minerals (THERAPEUTIC MULTIVITAMIN-MINERALS) tablet Take 1 tablet by mouth daily.     Historical Provider, MD       Surgical History:  Past Surgical History:   Procedure Laterality Date    BACK SURGERY  1976    COLONOSCOPY      HYSTERECTOMY (CERVIX STATUS UNKNOWN)  1978    JOINT REPLACEMENT Left 05/18/2017    Lt total shoulder , Dr Minesh Darnell N/A 8/12/2022    L1-5 Decompression L1-5 Posterior Fusion performed by Odin Vincent MD at [facility] ARTHROPLASTY Right 02/2018       Family History:  Family History   Problem Relation Age of Onset    COPD Mother     Emphysema Mother     Other Father         diverticulitis    Breast Cancer Maternal Aunt 46    Breast Cancer Maternal Aunt 62       Past GI History:  Colon polyp, diverticulosis, rectal prolapse, colonoscopy  Last colonoscopy 2020- no polyps, diverticulosis throughout the left colon, rectal prolapse otherwise normal     No further screening colonoscopies recommended    Allergies:  Codeine    Social History:   TOBACCO:   reports that she quit smoking about 14 years ago. Her smoking use included cigarettes. She started smoking about 53 years ago. She has a 22.00 pack-year smoking history. She has never used smokeless tobacco.  ETOH:   reports current alcohol use of about 4.0 standard drinks per week. Review Of Systems  GENERAL: +chills  EYES:  No blurred vision, double vision   CARDIOVASCULAR: No chest pain or palpitations. RESPIRATORY:  No dyspnea or cough. GI:  See HPI  MUSCULOSKELETAL: No new painful or swollen joints or myalgias. :   No dysuria or hematuria. SKIN:  No rashes or jaundice. NEUROLOGIC:  No headaches or seizures, numbness or tingling of arms, or legs. PSYCH:  No anxiety or depression. ENDOCRINE:  No polyuria or polydipsia. BLOOD:  No anemia, bleeding disorder, blood or blood product transfusion. PHYSICAL EXAM:  /76   Pulse (!) 113   Temp 98.7 °F (37.1 °C) (Oral)   Resp 18   Ht 5' 2\" (1.575 m)   Wt 160 lb (72.6 kg)   SpO2 100%   BMI 29.26 kg/m²     General appearance: No apparent distress, appears stated age and cooperative. HEENT: Normal cephalic, atraumatic without obvious deformity. Pupils equal, round, and reactive to light. Neck: Supple, with full range of motion. No jugular venous distention. Trachea midline. Respiratory:  Normal respiratory effort. Clear to auscultation, bilaterally without Rales/Wheezes/Rhonchi.   Cardiovascular: Regular rate and rhythm without murmurs, rubs or gallops. Abdomen: Soft, obese, tender to right abdomen with palpation, non-distended with active bowel sounds. Musculoskeletal: No clubbing, cyanosis or edema bilaterally. Skin: Pink, warm, dry. No rashes or lesions. Neurologic: Alert and oriented, thought content appropriate, normal insight    Labs:   Recent Labs     12/27/22  0315   WBC 30.2*   HGB 12.1   HCT 34.7*        Recent Labs     12/27/22  0315   *   K 4.2   CL 89*   CO2 26   BUN 18   CREATININE 0.8   CALCIUM 8.0*     Recent Labs     12/27/22  0315   AST 48*   ALT 27   BILIDIR <0.2   BILITOT 0.5   ALKPHOS 195*     No results for input(s): INR in the last 72 hours. Radiology:   CTA chest W WO contrast 12/27/22  Impression   No acute abnormality. No evidence of pulmonary embolus. Old granulomatous    exposure noted. CT abdomen/pelvis W IV contrast 12/27/22  FINDINGS:       The gallbladder is distended marked wall thickening. Intrahepatic and    extrahepatic duct dilatation is present. The common bile duct measures up    to 11 mm diameter. No radiopaque stone is identified within the    gallbladder or within the ducts. No liver or spleen lesion identified. Several small calcific granulomas of    the spleen are incidentally noted. No acute pancreatic abnormality is identified. There is a prominent    duodenal diverticulum which projects into the pancreatic tissue of the    head, and there is pancreatic duct dilatation measuring at least 6 mm. No    obvious pancreatic head mass lesion is appreciated. The adrenals and kidneys are normal appearing. No renal stone or urinary tract obstruction is identified. Atherosclerosis of aorta noted. No aneurysm. IVC and portal veins    unremarkable. No adenopathy or ascites is identified. Stomach and small bowel structures are unremarkable. Appendix not visualized, no secondary findings of appendicitis.    A few sigmoid diverticuli are present. No evidence of diverticulitis. Apparent previous hysterectomy. Urinary bladder unremarkable. No acute osseous abnormality. Multilevel laminectomies and pedicle screw    fusion from the L1 level through the L5 level. Previous hip arthroplasties bilaterally. Impression   1. Pronounced gallbladder and bile duct distention and marked wall    thickening of the gallbladder. Acute cholecystitis is suspected. Cholelithiasis is not specifically demonstrated on this study, but may be    present. Additionally, the pancreatic duct is dilated, raising the    possibility of obstruction in the region of the ampulla. No pancreatic    head lesion is appreciated. 2. Additional findings: Atherosclerosis, diverticuli of the sigmoid    region, prior hysterectomy, previous osseous surgical changes. MRCP W WO contrast 12/27/22  FINDINGS:        LOWER THORAX: Plate like atelectasis and/or scarring seen in the visualized lung bases. A small hiatal hernia is present. HEPATOBILIARY: Fatty infiltration of the liver. Tiny cysts are seen in the liver. Moderate hepatomegaly. Unremarkable hepatic surface morphology. The gallbladder is distended. Gallbladder wall thickening is seen. Sludge is present gallbladder. There is a    1.7 cm stone at the gallbladder neck. The common bile duct is dilated 1.2 cm. Sludge is present within the common bile duct. There is a 4 mm calculus in the common hepatic duct. PANCREAS AND SPLEEN: Mild atrophy of the pancreas. Mild pancreatic duct dilatation. No peripancreatic abnormality. The spleen is not enlarged       RETROPERITONEUM: A 1.1 cm nonenhancing cyst is seen in the superior pole of the left kidney. The kidneys are otherwise unremarkable. The adrenal glands are not enlarged       BOWEL AND PERITONEUM: Very small ascites. No bowel obstruction or acute inflammatory bowel process. Yudy Bailon        VASCULAR: The abdominal aorta is not aneurysmal. LYMPH NODES: No significantly enlarged lymph nodes are seen. BONES: No aggressive bony lesions noted. Decompressive laminectomy has been performed at L1-L5. Pedicle screw and julia fixation noted. Scoliosis. Impression   1. Gallbladder wall thickening with pericholecystic inflammation, sludge, gallstone in the gallbladder neck and gallbladder distention. Findings relate to acute cholecystitis. 2. Sludge is present within the common bile duct. A 4 mm calculus is seen within the common hepatic duct. 3. The common bile duct is dilated at 1.2 cm. 4. Mild pancreatic duct dilatation. 5. Hepatic steatosis. Code Status: Full Code    ASSESSMENT:  Right-sided abdominal pain  Nausea & vomiting- resolved  Acute cholecystitis- on ATBs  Leukocytosis- on ATBs  Dilated CBD with sludge- noted on MRCP  4 mm common hepatic duct stone- noted on MRCP  Mild pancreatic duct dilatation- noted on MRCP  Hepatic steatosis  Elevated alk phos  Elevated AST  Hypoalbuminemia  Hyponatremia  Hypocalcemia  UTI- on ATBs  S/P left total hip replacement 12/12/22  COPD, not exacerbated    PLAN:    IVF  ATBs per primary  NPO  Pain & nausea control per primary  Monitor HFP  ERCP with risks & benefits discussed, patient is agreeable to proceed  Will schedule for urgent ERCP today with Dr. Pricilla Lewis  Indomethacin suppository once prior to ERCP  RN updated  Supportive care per primary team  Will follow       Case reviewed and impression/plan reviewed in collaboration with Dr. Abagail Mortimer  Electronically signed by STACY Miller - CNP on 12/27/2022 at 11:55 AM    [address]  Thank you for the consultation.

## 2022-12-27 NOTE — ED TRIAGE NOTES
Patient presents to ED with chief complaint of Abdominal pain and emesis. Patient states she had hip surgery (left) last week, and is unsure if she is constipated due to the pain medication. Patient states she also ate some soup yesterday that did not agree with her. Pain in abdomen rated at a 3/10. Patient resting in bed. Respirations easy and unlabored. No distress noted. Call light within reach.

## 2022-12-27 NOTE — PROCEDURES
[facility]  ERCP  Endoscopic Retrograde Cholangiopancreatography Procedure Report  Patient: Abbe Sapp  : 1950  Acct#: [de-identified]  PHYSICIAN:  Rosie Robles MD    PREPROCEDURE DIAGNOSIS:  This is a 67 y.o. , female , with history of acute calculous cholecystitis with 4mm calculus within common hepatic duct. Medications:  See Anesthesia Records for details. Zosyn on floor IV preprocedure   Indocin 100mg suppository given pre procedure. DESCRIPTION OF PROCEDURE: The  risk of bleeding, trauma, infection, perforation, pancreatitis, death and medication-related cardiac and respiratory complications were discussed and written informed consent was obtained. The endoscope was passed with ease through the mouth under direct visualization; it was advanced to the 2nd portion of the duodenum. The scope was withdrawn and the mucosa was examined. See summary for detailed findings. Findings:  Normal, limited endoscopic exam from the esophagus to the second portion of the duodenum. Normal major papilla. Cannulation:  Biliary cannulation was performed using a sphinterotome with a 0.035 in x 250cm guidewire. Contrast injection revealed   (partial head of the pancreas pancreatogram which was normal),  The common bile duct measured 12 mm in diameter. Normal cholangiogram of the common hepatic duct,  right and left main and intrahepatics. Normal cystic duct with filling defect in the cystic duct. Next a biliary sphincterotomy was performed over the guidewire using standard settings in the 12 oclock position. Next,  balloon sweeps and balloon cholangiogram were obtained using a 12 mm extraction balloon yielding no sludge or stones. The stomach was decompressed. Complications: The patient tolerated the procedure well. There were no complications.     EBL : < 10 mL      SUMMARY:   Otherwise normal limited endoscopic exam from the esophagus to the second portion of the duodenum. Periampullary diverticulum major papilla  12 mm common bile duct. Filling defect was found in the cystic duct, stricture NOT found        Successful  biliary sphincterotomy  Balloon cholangiogram and pull through yielding no stones/sludge. Stomach decompressed    RECOMMENDATIONS:    Begin clears today when awake, alert, and oriented and pain < 4/10. NPO for surgery , cholecystectomy tomorrow with Dr. Eric Hutchins. Sacral decubitus ulcer noted, Dr. Eric Hutchins informed. Avoid Aspirin, NSAIDs, Aspirin,  Heparin and plavix over the next 48 hours. Resume previous medications. If patient develops melena/dark stool in the next two weeks, report immediately to the ER and call the GI doctor on call at                       [facility]. To get cholecystectomy tomorrow if does not develop acute pancreatitis. GI team to see tomorrow morning.        Specimens: were not obtained    (The following sections must be completed)  Post-Sedation Vital Signs: Vital signs were reviewed and were stable after the procedure (see flow sheet for vitals)            Post-Sedation Exam: Lungs: clear to auscultation bilaterally and Cardiovascular: regular rate and rhythm           Complications: none      Performed By:  Cayla Cabello MD, [facility]

## 2022-12-27 NOTE — ED PROVIDER NOTES
[facility] [address] [address] EMERGENCY DEPT      EMERGENCY MEDICINE     Pt Name: Naomi Alvarez  MRN: [id number]  [place] 1950  Date of evaluation: 12/27/2022  Provider: STACY Marie CNP    CHIEF COMPLAINT       Chief Complaint   Patient presents with    Abdominal Pain    Emesis    Shortness of Breath     HISTORY OF PRESENT ILLNESS   Naomi Alvarez is a pleasant 67 y.o. female who presents to the emergency department from home for abdominal pain, vomiting and shortness of breath. The patient had a left hip replacement on the 12th. She has been on pain medications. Started having constipation on Saturday. Took two doses of MOM. Had a lot of loose stool after that which helped the pain. Last night pain returned and has not let up. It has gotten much worse. Pain is largely in the RUQ and epigastric area. Had vomiting on Saturday. Reports some fevers. Hip is unremarkable and has been moving appropriately without significant pain.       PASTMEDICAL HISTORY     Past Medical History:   Diagnosis Date    Anxiety     Arthritis     Cancer ([facility] 1978    CERVICAL CANCER    COPD (chronic obstructive pulmonary disease) ([address] 05/24/2021    Endometrial cancer (HCC)     cervical CA at age 32    Female bladder prolapse     with rectocele    Hypertension        Patient Active Problem List   Diagnosis Code    COPD (chronic obstructive pulmonary disease) ([facility] J44.9    Osteoarthritis of multiple joints M15.9    Hypertension I10    Lumbar stenosis with neurogenic claudication M48.062    BRYCE (generalized anxiety disorder) F41.1     SURGICAL HISTORY       Past Surgical History:   Procedure Laterality Date    BACK SURGERY  1976    COLONOSCOPY      HYSTERECTOMY (CERVIX STATUS UNKNOWN)  1978    JOINT REPLACEMENT Left 05/18/2017    Lt total shoulder , Dr Etta Momin N/A 8/12/2022    L1-5 Decompression L1-5 Posterior Fusion performed by Sima Chirinos MD at [address] [place] [address] Right 2018       CURRENT MEDICATIONS       Previous Medications    ACETAMINOPHEN (TYLENOL) 650 MG EXTENDED RELEASE TABLET    Take 650 mg by mouth every 8 hours as needed for Pain    ALPRAZOLAM (XANAX) 0.25 MG TABLET    Take 0.25 mg by mouth 2 times daily as needed for Anxiety. AMLODIPINE (NORVASC) 2.5 MG TABLET    Take 1 tablet by mouth daily    CYCLOBENZAPRINE (FLEXERIL) 5 MG TABLET    Take 5 mg by mouth 3 times daily as needed for Muscle spasms    DIPHENHYDRAMINE (BENADRYL) 25 MG TABLET    Take 25 mg by mouth every 6 hours as needed for Itching or Allergies    DOCUSATE SODIUM (COLACE) 100 MG CAPSULE    Take 100 mg by mouth daily    FAMOTIDINE (PEPCID) 20 MG TABLET    Take 20 mg by mouth daily    MULTIPLE VITAMINS-MINERALS (THERAPEUTIC MULTIVITAMIN-MINERALS) TABLET    Take 1 tablet by mouth daily. TRAMADOL (ULTRAM) 50 MG TABLET    Take 50 mg by mouth every 8 hours as needed for Pain. VITAMIN D 25 MCG (1000 UT) CAPS    Take by mouth       ALLERGIES     is allergic to codeine. FAMILY HISTORY     She indicated that her mother is . She indicated that her father is . SOCIAL HISTORY       Social History     Tobacco Use    Smoking status: Former     Packs/day: 0.50     Years: 44.00     Pack years: 22.00     Types: Cigarettes     Start date: 1970     Quit date:      Years since quittin.9    Smokeless tobacco: Never   Vaping Use    Vaping Use: Never used   Substance Use Topics    Alcohol use: Yes     Alcohol/week: 4.0 standard drinks     Types: 2 Glasses of wine, 2 Shots of liquor per week     Comment: A WEEK, GLASS OF WINE on saturday    Drug use: No       PHYSICAL EXAM       ED Triage Vitals [22 0247]   BP Temp Temp Source Heart Rate Resp SpO2 Height Weight   100/71 99.1 °F (37.3 °C) Oral (!) 113 18 99 % -- 160 lb (72.6 kg)       Physical Exam  Vitals and nursing note reviewed. Constitutional:       General: She is not in acute distress.      Appearance: She is well-developed. She is ill-appearing. She is not diaphoretic. HENT:      Head: Normocephalic and atraumatic. Nose: Nose normal.      Mouth/Throat:      Mouth: Mucous membranes are moist.      Pharynx: Oropharynx is clear. Eyes:      General:         Right eye: No discharge. Left eye: No discharge. Conjunctiva/sclera: Conjunctivae normal.   Neck:      Trachea: No tracheal deviation. Cardiovascular:      Rate and Rhythm: Normal rate and regular rhythm. Pulses: Normal pulses. Heart sounds: Normal heart sounds. No murmur heard. No gallop. Comments: Normal capillary refill  Pulmonary:      Effort: Pulmonary effort is normal. No respiratory distress. Breath sounds: Normal breath sounds. No stridor. Abdominal:      General: Bowel sounds are normal.      Palpations: Abdomen is soft. Tenderness: There is abdominal tenderness in the right upper quadrant and epigastric area. Musculoskeletal:         General: No tenderness or deformity. Normal range of motion. Cervical back: Normal range of motion. Skin:     General: Skin is warm and dry. Capillary Refill: Capillary refill takes less than 2 seconds. Coloration: Skin is not pale. Findings: No erythema or rash. Neurological:      General: No focal deficit present. Mental Status: She is alert and oriented to person, place, and time. Cranial Nerves: No cranial nerve deficit.    Psychiatric:         Mood and Affect: Mood normal.         Behavior: Behavior normal.       FORMAL DIAGNOSTIC RESULTS     RADIOLOGY: Interpretation per the Radiologist below, if available at the time of this note (none if blank):    CTA CHEST W WO CONTRAST    (Results Pending)   CT ABDOMEN PELVIS W IV CONTRAST Additional Contrast? None    (Results Pending)       LABS: (none if blank)  Labs Reviewed   CBC WITH AUTO DIFFERENTIAL - Abnormal; Notable for the following components:       Result Value    WBC 30.2 (*)     RBC 4.09 (*)     Hematocrit 34.7 (*)     RDW-CV 15.2 (*)     RDW-SD 46.9 (*)     MPV 9.0 (*)     Segs Absolute 26.7 (*)     Lymphocytes Absolute 0.8 (*)     Immature Grans (Abs) 2.10 (*)     All other components within normal limits   BASIC METABOLIC PANEL - Abnormal; Notable for the following components:    Sodium 129 (*)     Chloride 89 (*)     Calcium 8.0 (*)     All other components within normal limits   HEPATIC FUNCTION PANEL - Abnormal; Notable for the following components:    Albumin 3.3 (*)     Alkaline Phosphatase 195 (*)     AST 48 (*)     Total Protein 5.8 (*)     All other components within normal limits   OSMOLALITY - Abnormal; Notable for the following components:    Osmolality Calc 259.9 (*)     All other components within normal limits   CULTURE, BLOOD 1   CULTURE, BLOOD 2   LIPASE   ANION GAP   GLOMERULAR FILTRATION RATE, ESTIMATED   SCAN OF BLOOD SMEAR   LACTIC ACID   URINALYSIS WITH REFLEX TO CULTURE   PROCALCITONIN       (Any cultures that may have been sent were not resulted at the time of this patient visit)    [facility] / ED COURSE:     1) Number and Complexity of Problems            Problem List This Visit:         Chief Complaint   Patient presents with    Abdominal Pain    Emesis    Shortness of Breath            Differential Diagnosis includes (but not limited to): Cholecystitis, pancreatitis, bowel obstruction        Diagnoses Considered but I have low suspicion of:   ischemia             Pertinent Comorbid Conditions:    Post op, htn    2)  Data Reviewed (none if left blank)          My Independent interpretations:     EKG:      Sinus tach    Imaging: pending    Labs:      WBC >30                 Decision Rules/Clinical Scores utilized:                           External Documentation Reviewed:         Previous patient encounter documents & history available on EMR was reviewed              See Formal Diagnostic Results above for the lab and radiology tests and orders.     3) Treatment and Disposition         ED Reassessment:  Required multiple doses of pain meds         Case discussed with consulting clinician:  none at this time         Shared Decision-Making was performed and disposition discussed with the       Patient/Family and questions answered          Social determinants of health impacting treatment or disposition:  none         Code Status:  Full      Summary of Patient Presentation:      MARGARETH  /   Mat Ball Reviewed:    Vitals:    12/27/22 0247 12/27/22 0340 12/27/22 0557   BP: 100/71 109/65 115/73   Pulse: (!) 113 (!) 118 (!) 128   Resp: 18 22 22   Temp: 99.1 °F (37.3 °C)     TempSrc: Oral     SpO2: 99% 93% 92%   Weight: 160 lb (72.6 kg)         The patient was seen and examined. Appropriate diagnostic testing was performed and results reviewed. She is medicated for pain. 30 ml/kg bolus, Vanc and zosyn are ordered due to WBC of 30. She is pending imaging results. Care is transferred to [place], [place]. Please see his notes. No notes of  Admission Criteria type on file. Please note that the patient was evaluated during a pandemic. All efforts were made for HIPPA compliance as well as provision of appropriate care. Patient was seen independently by myself. The patient's final impression and disposition and plan was determined by myself. Strict return precautions and follow up instructions were discussed with the patient prior to discharge, with which the patient agrees. Physical assessment findings, diagnostic testing(s) if applicable, and vital signs reviewed with patient/patient representative. Questions answered. Medications asdirected, including OTC medications for supportive care. Education provided on medications. Differential diagnosis(s) discussed with patient/patient representative. Home care/self care instructions reviewed withpatient/patient representative.   Patient is to follow-up with family care provider in 2-3 days if no improvement. Patient is to go to the emergency department if symptoms worsen. Patient/patient representative isaware of care plan, questions answered, verbalizes understanding and is in agreement. ED Medications administered this visit:  (None if blank)  Medications   0.9 % sodium chloride bolus (500 mLs IntraVENous New Bag 12/27/22 0601)   vancomycin (VANCOCIN) 1500 mg in dextrose 5% 500 mL IVPB (has no administration in time range)   piperacillin-tazobactam (ZOSYN) 3,375 mg in dextrose 5 % 50 mL IVPB (mini-bag) (has no administration in time range)   0.9 % sodium chloride bolus (0 mLs IntraVENous Stopped 12/27/22 0601)   fentaNYL (SUBLIMAZE) injection 50 mcg (50 mcg IntraVENous Given 12/27/22 0339)   ondansetron (ZOFRAN) injection 4 mg (4 mg IntraVENous Given 12/27/22 0338)   fentaNYL (SUBLIMAZE) injection 50 mcg (50 mcg IntraVENous Given 12/27/22 0434)   iopamidol (ISOVUE-370) 76 % injection 80 mL (80 mLs IntraVENous Given 12/27/22 0442)   HYDROmorphone (DILAUDID) injection 0.5 mg (0.5 mg IntraVENous Given 12/27/22 0602)         CONSULTS:  None    PROCEDURES: (None if blank)  Procedures:     CRITICAL CARE: (None if blank)      DISCHARGE PRESCRIPTIONS: (None if blank)  New Prescriptions    No medications on file       FINAL IMPRESSION      1. Right upper quadrant abdominal pain    2. Leukocytosis, unspecified type          DISPOSITION/PLAN   DISPOSITION        OUTPATIENT FOLLOW UP THE PATIENT:  No follow-up provider specified.     STACY Moulton CNP, APRN - CNP  12/27/22 1307

## 2022-12-27 NOTE — ANESTHESIA PRE PROCEDURE
Department of Anesthesiology  Preprocedure Note       Name:  Leonides Arambula   Age:  67 y.o.  :  1950                                          MRN:  [id number]         Date:  2022      Surgeon: Ebony Mujica):  Yasemin Chaidez MD    Procedure: Procedure(s):  ERCP    Medications prior to admission:   Prior to Admission medications    Medication Sig Start Date End Date Taking? Authorizing Provider   aspirin 325 MG tablet Take 325 mg by mouth daily   Yes Historical Provider, MD   omeprazole (PRILOSEC) 20 MG delayed release capsule Take 20 mg by mouth daily   Yes Historical Provider, MD   traMADol (ULTRAM) 50 MG tablet Take 50 mg by mouth every 8 hours as needed for Pain. Patient not taking: Reported on 2022    Historical Provider, MD   amLODIPine (NORVASC) 2.5 MG tablet Take 1 tablet by mouth daily 10/21/22   Ashley Quevedo MD   cyclobenzaprine (FLEXERIL) 5 MG tablet Take 5 mg by mouth 3 times daily as needed for Muscle spasms  Patient not taking: Reported on 2022    Historical Provider, MD   ALPRAZolam Stas Moeller) 0.25 MG tablet Take 0.25 mg by mouth 2 times daily as needed for Anxiety. Historical Provider, MD   acetaminophen (TYLENOL) 650 MG extended release tablet Take 650 mg by mouth every 8 hours as needed for Pain    Historical Provider, MD   vitamin D 25 MCG (1000 UT) CAPS Take by mouth    Historical Provider, MD   famotidine (PEPCID) 20 MG tablet Take 20 mg by mouth daily    Historical Provider, MD   diphenhydrAMINE (BENADRYL) 25 MG tablet Take 25 mg by mouth every 6 hours as needed for Itching or Allergies    Historical Provider, MD   docusate sodium (COLACE) 100 MG capsule Take 100 mg by mouth daily    Historical Provider, MD   Multiple Vitamins-Minerals (THERAPEUTIC MULTIVITAMIN-MINERALS) tablet Take 1 tablet by mouth daily.     Historical Provider, MD       Current medications:    Current Facility-Administered Medications   Medication Dose Route Frequency Provider Last Rate Last Admin  sodium chloride flush 0.9 % injection 5-40 mL  5-40 mL IntraVENous 2 times per day Raeann Eduardo MD        sodium chloride flush 0.9 % injection 5-40 mL  5-40 mL IntraVENous PRN Raeann Eduardo MD        0.9 % sodium chloride infusion   IntraVENous PRN Raeann Eduardo MD        ondansetron (ZOFRAN-ODT) disintegrating tablet 4 mg  4 mg Oral Q8H PRN Raeann Eduardo MD        Or    ondansetron [facility]) injection 4 mg  4 mg IntraVENous Q6H PRN Raeann Eduardo MD        0.9 % sodium chloride infusion   IntraVENous Continuous Raeann Eduardo  mL/hr at 12/27/22 1010 New Bag at 12/27/22 1010    piperacillin-tazobactam (ZOSYN) 3,375 mg in dextrose 5 % 50 mL IVPB extended infusion (mini-bag)  3,375 mg IntraVENous Q8H Raeann Eduardo MD 12.5 mL/hr at 12/27/22 1235 3,375 mg at 12/27/22 1235    HYDROmorphone (DILAUDID) injection 0.25 mg  0.25 mg IntraVENous Q3H PRN Raeann Eduardo MD        Or    HYDROmorphone (DILAUDID) injection 0.5 mg  0.5 mg IntraVENous Q3H PRN Raeann Eduardo MD   0.5 mg at 12/27/22 1334    ALPRAZolam (XANAX) tablet 0.25 mg  0.25 mg Oral BID PRN Atlanta Arn, APRN - CNP        amLODIPine (NORVASC) tablet 2.5 mg  2.5 mg Oral Daily Barbara Arn, APRN - CNP        acetaminophen (TYLENOL) tablet 650 mg  650 mg Oral Q8H PRN Barbara Arn, APRN - CNP        HYDROcodone-acetaminophen (NORCO) 5-325 MG per tablet 1 tablet  1 tablet Oral Q4H PRN Atlanta Arn, APRN - CNP        Or    HYDROcodone-acetaminophen (NORCO) 5-325 MG per tablet 2 tablet  2 tablet Oral Q4H PRN Atlanta Arn, APRN - CNP           Allergies:     Allergies   Allergen Reactions    Codeine Nausea And Vomiting       Problem List:    Patient Active Problem List   Diagnosis Code    [facility] J44.9    Osteoarthritis of multiple joints M15.9    Hypertension I10    Lumbar stenosis with neurogenic claudication M48.062    BRYCE (generalized anxiety disorder) F41.1    Acute cholecystitis K81.0    Right upper quadrant abdominal pain R10.11       Past Medical History:        Diagnosis Date    Anxiety     Arthritis     [facility]     CERVICAL CANCER    COPD (ch[facility] obstructive [facility] disease) ([facility] 2021    Endometrial cancer (HCC)     cervical CA at age 32    Female bladder prolapse     with rectocele    Hypertension        Past Surgical History:        Procedure Laterality Date    BACK SURGERY      COLONOSCOPY      HYSTERECTOMY (CERVIX STATUS UNKNOWN)      JOINT REPLACEMENT Left 2017    Lt total shoulder , Dr Ramiro Blanchard N/A 2022    L1-5 Decompression L1-5 Posterior Fusion performed by Queta Olivas MD at [address] [facility] Right 2018       Social History:    Social History     Tobacco Use    Smoking status: Former     Packs/day: 0.50     Years: 44.00     Pack years: 22.00     Types: Cigarettes     Start date: 1970     Quit date:      Years since quittin.9    Smokeless tobacco: Never   Substance Use Topics    Alcohol use:  Yes     Alcohol/week: 4.0 standard drinks     Types: 2 Glasses of wine, 2 Shots of liquor per week     Comment: A WEEK, GLASS OF WINE on saturday                                Counseling given: Not Answered      Vital Signs (Current):   Vitals:    22 1018 22 1030 22 1348 22 1351   BP:   (!) 103/58    Pulse:   (!) 115    Resp:   18    Temp: 37.1 °C (98.7 °F)      TempSrc: Oral      SpO2:   91% 93%   Weight:  160 lb (72.6 kg)     Height:  5' 2\" (1.575 m)                                                BP Readings from Last 3 Encounters:   22 (!) 103/58   22 136/80   10/21/22 134/84       NPO Status: Time of last liquid consumption:                         Time of last solid consumption:                         Date of last liquid consumption: 22                        Date of last solid food consumption: 12/26/22    BMI:   Wt Readings from Last 3 Encounters:   12/27/22 160 lb (72.6 kg)   10/21/22 158 lb (71.7 kg)   08/12/22 162 lb 9.6 oz (73.8 kg)     Body mass index is 29.26 kg/m². CBC:   Lab Results   Component Value Date/Time    WBC 30.2 12/27/2022 03:15 AM    RBC 4.09 12/27/2022 03:15 AM    RBC 4.49 07/15/2011 03:09 PM    HGB 12.1 12/27/2022 03:15 AM    HCT 34.7 12/27/2022 03:15 AM    MCV 84.8 12/27/2022 03:15 AM    RDW 14.3 08/10/2017 11:55 AM     12/27/2022 03:15 AM       CMP:   Lab Results   Component Value Date/Time     12/27/2022 03:15 AM    K 4.2 12/27/2022 03:15 AM    CL 89 12/27/2022 03:15 AM    CO2 26 12/27/2022 03:15 AM    BUN 18 12/27/2022 03:15 AM    CREATININE 0.8 12/27/2022 03:15 AM    LABGLOM >60 12/27/2022 03:15 AM    GLUCOSE 82 12/27/2022 03:15 AM    GLUCOSE 92 07/15/2011 03:09 PM    PROT 5.8 12/27/2022 03:15 AM    CALCIUM 8.0 12/27/2022 03:15 AM    BILITOT 0.5 12/27/2022 03:15 AM    ALKPHOS 195 12/27/2022 03:15 AM    AST 48 12/27/2022 03:15 AM    ALT 27 12/27/2022 03:15 AM       POC Tests: No results for input(s): POCGLU, POCNA, POCK, POCCL, POCBUN, POCHEMO, POCHCT in the last 72 hours.     Coags:   Lab Results   Component Value Date/Time    INR 0.84 07/21/2022 10:34 AM    APTT 29.8 07/21/2022 10:34 AM       HCG (If Applicable): No results found for: PREGTESTUR, PREGSERUM, HCG, HCGQUANT     ABGs: No results found for: PHART, PO2ART, MFN1WEV, OJP3HJL, BEART, Z3LNOVQL     Type & Screen (If Applicable):  Lab Results   Component Value Date    LABRH POS 08/12/2022       Drug/Infectious Status (If Applicable):  Lab Results   Component Value Date/Time    HEPCAB Negative 08/10/2017 11:55 AM       COVID-19 Screening (If Applicable):   Lab Results   Component Value Date/Time    COVID19 NOT DETECTED 08/19/2022 11:55 AM           Anesthesia Evaluation  Patient summary reviewed and Nursing notes reviewed no history of anesthetic complications:   Airway: Mallampati: II  TM distance: >3 FB   Neck ROM: full  Mouth opening: > = 3 FB   Dental:          Pulmonary:   (+) COPD: mild,                             Cardiovascular:  Exercise tolerance: good (>4 METS),   (+) hypertension:,                   Neuro/Psych:   (+) depression/anxiety             GI/Hepatic/Renal: Neg GI/Hepatic/Renal ROS            Endo/Other:    (+) : arthritis: OA., .                 Abdominal:             Vascular: negative vascular ROS. Other Findings:           Anesthesia Plan      general     ASA 2       Induction: intravenous. MIPS: Prophylactic antiemetics administered. Anesthetic plan and risks discussed with patient. Plan discussed with attending.                     STACY Deleon - VERÓNICA   12/27/2022

## 2022-12-27 NOTE — ED NOTES
Upon first contact with patient this RN receives bedside shift report. Patient alert and oriented. Respirations easy and unlabored.       Donaciano Essex, RN  12/27/22 4275

## 2022-12-27 NOTE — ED PROVIDER NOTES
Addendum: Care was assumed at 6 AM.  Patient had to be given vancomycin and Zosyn as well as a 30 mg/kg fluid bolus for sepsis. The patient's procalcitonin was over 100. The patient remained at an adequate MAP in the emergency department. .  We did receive CT scan results back we did contact the general surgeon Dr. Michela Cortés who agreed to set the patient for admission. The patient does have sepsis secondary to cholecystitis. Final diagnosis  1. Sepsis  2.   Acute cholecystitis     SAVANNA Solorzano  12/27/22 2985

## 2022-12-27 NOTE — H&P
[address] [address]  Sedation/Analgesia History & Physical    Patient: Chet Mcmahan: 1950  Med Rec#: [id number] Acc#: [id number]   Provider Performing Procedure: Cheyenne Reyes MD  Primary Care Physician: Steve Greenfield MD    PRE-PROCEDURE   Brief History/Pre-Procedure Diagnosis:The patient is a 67 y.o.,  female with significant past medical history of acute calculous cholecystitis with 4mm calculus within common hepatic duct. MEDICAL HISTORY  []CAD/Valve  []Liver Disease  []Lung Disease []Diabetes  []Hypertension []Renal Disease  [x]Additional information:       has a past medical history of Anxiety, Arthritis, Cancer ([facility]) [facility] Endometrial cancer [facility]), Female bladder prolapse, and Hypertension. SURGICAL HISTORY   has a past surgical history that includes Hysterectomy (1978); back surgery (1976); joint replacement (Left, 05/18/2017); Total hip arthroplasty (Right, 02/2018); Colonoscopy; and lumbar fusion (N/A, 8/12/2022).   Additional information:       ALLERGIES   Allergies as of 12/27/2022 - Fully Reviewed 12/27/2022   Allergen Reaction Noted    Codeine Nausea And Vomiting 05/10/2017     Additional information:       MEDICATIONS       Current Facility-Administered Medications:     sodium chloride flush 0.9 % injection 5-40 mL, 5-40 mL, IntraVENous, 2 times per day, Rahul Zavala MD    sodium chloride flush 0.9 % injection 5-40 mL, 5-40 mL, IntraVENous, PRN, Rahul Zavala MD    0.9 % sodium chloride infusion, , IntraVENous, PRN, Rahul Zavala MD    ondansetron (ZOFRAN-ODT) disintegrating tablet 4 mg, 4 mg, Oral, Q8H PRN **OR** ondansetron (ZOFRAN) injection 4 mg, 4 mg, IntraVENous, Q6H PRN, Rahul Zavala MD    0.9 % sodium chloride infusion, , IntraVENous, Continuous, Rahul Zavala MD, Last Rate: 125 mL/hr at 12/27/22 1010, New Bag at 12/27/22 1010    piperacillin-tazobactam (ZOSYN) 3,375 mg in dextrose 5 % 50 mL IVPB extended infusion (mini-bag), 3,375 mg, IntraVENous, Q8H, Sabrina Hardin MD    HYDROmorphone (DILAUDID) injection 0.25 mg, 0.25 mg, IntraVENous, Q3H PRN **OR** HYDROmorphone (DILAUDID) injection 0.5 mg, 0.5 mg, IntraVENous, Q3H PRN, Sabrina Hardin MD, 0.5 mg at 12/27/22 1022  Prior to Admission medications    Medication Sig Start Date End Date Taking? Authorizing Provider   aspirin 325 MG tablet Take 325 mg by mouth daily   Yes Historical Provider, MD   omeprazole (PRILOSEC) 20 MG delayed release capsule Take 20 mg by mouth daily   Yes Historical Provider, MD   traMADol (ULTRAM) 50 MG tablet Take 50 mg by mouth every 8 hours as needed for Pain. Patient not taking: Reported on 12/27/2022    Historical Provider, MD   amLODIPine (NORVASC) 2.5 MG tablet Take 1 tablet by mouth daily 10/21/22   Marie Boone MD   cyclobenzaprine (FLEXERIL) 5 MG tablet Take 5 mg by mouth 3 times daily as needed for Muscle spasms  Patient not taking: Reported on 12/6/2022    Historical Provider, MD   ALPRAZolam Larnell Satchel) 0.25 MG tablet Take 0.25 mg by mouth 2 times daily as needed for Anxiety. Historical Provider, MD   acetaminophen (TYLENOL) 650 MG extended release tablet Take 650 mg by mouth every 8 hours as needed for Pain    Historical Provider, MD   vitamin D 25 MCG (1000 UT) CAPS Take by mouth    Historical Provider, MD   famotidine (PEPCID) 20 MG tablet Take 20 mg by mouth daily    Historical Provider, MD   diphenhydrAMINE (BENADRYL) 25 MG tablet Take 25 mg by mouth every 6 hours as needed for Itching or Allergies    Historical Provider, MD   docusate sodium (COLACE) 100 MG capsule Take 100 mg by mouth daily    Historical Provider, MD   Multiple Vitamins-Minerals (THERAPEUTIC MULTIVITAMIN-MINERALS) tablet Take 1 tablet by mouth daily. Historical Provider, MD     Additional information:       PHYSICAL:    height is 5' 2\" (1.575 m) and weight is 160 lb (72.6 kg). Her oral temperature is 98.7 °F (37.1 °C).  Her blood pressure is 100/76 and her pulse is 113 (abnormal). Her respiration is 18 and oxygen saturation is 100%. Heart:  [x]Regular rate and rhythm  []Other:    Lungs:  [x]Clear    []Other:    Abdomen: [x]Soft    []Other:    Mental Status: [x]Alert & Oriented  []Other:      VITAL SIGNS   Patient Vitals for the past 24 hrs:   BP Temp Temp src Pulse Resp SpO2 Height Weight   12/27/22 1030 -- -- -- -- -- -- 5' 2\" (1.575 m) 160 lb (72.6 kg)   12/27/22 1018 -- 98.7 °F (37.1 °C) Oral -- -- -- -- --   12/27/22 1011 100/76 -- -- (!) 113 18 100 % -- --   12/27/22 0802 107/63 -- -- (!) 111 19 99 % -- --   12/27/22 0722 97/62 -- -- (!) 118 20 99 % -- --   12/27/22 0652 (!) 104/58 -- -- (!) 111 19 97 % -- --   12/27/22 0616 105/65 99.5 °F (37.5 °C) -- (!) 125 19 99 % -- --   12/27/22 0557 115/73 -- -- (!) 128 22 92 % -- --   12/27/22 0340 109/65 -- -- (!) 118 22 93 % -- --   12/27/22 0247 100/71 99.1 °F (37.3 °C) Oral (!) 113 18 99 % -- 160 lb (72.6 kg)       PLANNED PROCEDURE   []EGD  []Colonoscopy []Flex Sigmoid  [x]ERCP []EUS   []Cystoscopy  [] CATH [] BRONCH     Consent: I have discussed with the patient and/or the patient representative the indication, alternatives, and the possible risks and/or complications of the planned procedure and the anesthesia methods. The patient and/or patient representative appear to understand and agree to proceed. SEDATION (see Anesthesia note)    ASA Classification: Class 2 - A normal healthy patient with mild systemic disease    Airway Assessment: normal    Monitoring and Safety: The patient will be placed on a cardiac monitor and vital signs, pulse oximetry and level of consciousness will be continuously evaluated throughout the procedure. The patient will be closely monitored until recovery from the medications is complete and the patient has returned to baseline status. Respiratory therapy will be on standby during the procedure.     [x]Pre-procedure diagnostic studies complete and results available. Comment:    [x]Previous sedation/anesthesia experiences assessed. Comment:    [x]The patient is an appropriate candidate to undergo the planned procedure sedation and anesthesia. (Refer to nursing sedation/analgesia documentation record)  [x]Formulation and discussion of sedation/procedure plan, risks, and expectations with patient and/or responsible adult completed. [x]Patient examined immediately prior to the procedure.  (Refer to nursing sedation/analgesia documentation record)    Esme Wallis MD   Electronically signed 12/27/2022 at 1500

## 2022-12-27 NOTE — ED NOTES
ED to inpatient nurses report    Chief Complaint   Patient presents with    Abdominal Pain    Emesis    Shortness of Breath      Present to ED from home  LOC: alert and orientated to name, place, date  Vital signs   Vitals:    12/27/22 0616 12/27/22 0652 12/27/22 0722 12/27/22 0802   BP: 105/65 (!) 104/58 97/62 107/63   Pulse: (!) 125 (!) 111 (!) 118 (!) 111   Resp: 19 19 20 19   Temp: 99.5 °F (37.5 °C)      TempSrc:       SpO2: 99% 97% 99% 99%   Weight:          Oxygen Baseline none    Current needs required 2L NC Bipap/Cpap No  LDAs:   Peripheral IV 12/27/22 Left Antecubital (Active)   Site Assessment Clean, dry & intact 12/27/22 0253       Peripheral IV 12/27/22 Right Antecubital (Active)   Site Assessment Clean, dry & intact 12/27/22 0707   Line Status Brisk blood return;Flushed;Normal saline locked 12/27/22 0707   Phlebitis Assessment No symptoms 12/27/22 0707   Infiltration Assessment 0 12/27/22 0707     Mobility: Requires assistance * 1  Present condition: stable    C-SSRS    Swallow Screening    Preferred Language: [place]     Electronically signed by Liang Aguero RN on 12/27/2022 at 9:04 AM       Liang Aguero RN  12/27/22 6981

## 2022-12-28 LAB
ACINETOBACTER CALCOACETICUS-BAUMANNII COMPLEX: NOT DETECTED
ALBUMIN SERPL-MCNC: 2.3 G/DL (ref 3.5–5.1)
ALP BLD-CCNC: 142 U/L (ref 38–126)
ALT SERPL-CCNC: 16 U/L (ref 11–66)
ANION GAP SERPL CALCULATED.3IONS-SCNC: 10 MEQ/L (ref 8–16)
ANTIBIOTIC RESISTANCE MARKER: VANCOMYCIN: VANA,B: ABNORMAL
AST SERPL-CCNC: 20 U/L (ref 5–40)
BACTEROIDES FRAGILIS: NOT DETECTED
BILIRUB SERPL-MCNC: 0.3 MG/DL (ref 0.3–1.2)
BOTTLE TYPE: ABNORMAL
BUN BLDV-MCNC: 13 MG/DL (ref 7–22)
CALCIUM SERPL-MCNC: 7.3 MG/DL (ref 8.5–10.5)
CANDIDA ALBICANS: NOT DETECTED
CANDIDA AURIS: NOT DETECTED
CANDIDA GLABRATA: NOT DETECTED
CANDIDA KRUSEI: NOT DETECTED
CANDIDA PARAPSILOSIS: NOT DETECTED
CANDIDA TROPICALIS: NOT DETECTED
CHLORIDE BLD-SCNC: 103 MEQ/L (ref 98–111)
CO2: 23 MEQ/L (ref 23–33)
CREAT SERPL-MCNC: 0.4 MG/DL (ref 0.4–1.2)
CRYPTOCOCCUS NEOFORMANS/GATTII: NOT DETECTED
CTX-M: NOT DETECTED
ENTEROBACTER CLOACAE COMPLEX: NOT DETECTED
ENTEROBACTERALES: DETECTED
ENTEROCOCCUS FAECALIS: NOT DETECTED
ENTEROCOCCUS FAECIUM: NOT DETECTED
ERYTHROCYTE [DISTWIDTH] IN BLOOD BY AUTOMATED COUNT: 15.5 % (ref 11.5–14.5)
ERYTHROCYTE [DISTWIDTH] IN BLOOD BY AUTOMATED COUNT: 50.3 FL (ref 35–45)
ESCHERICHIA COLI: NOT DETECTED
GFR SERPL CREATININE-BSD FRML MDRD: > 60 ML/MIN/1.73M2
GLUCOSE BLD-MCNC: 132 MG/DL (ref 70–108)
HAEMOPHILUS INFLUENZAE: NOT DETECTED
HCT VFR BLD CALC: 31.7 % (ref 37–47)
HEMOGLOBIN: 10.3 GM/DL (ref 12–16)
IMP: NOT DETECTED
KLEBSIELLA AEROGENES: NOT DETECTED
KLEBSIELLA OXYTOCA: DETECTED
KLEBSIELLA PNEUMONIAE GROUP: NOT DETECTED
KPC (CARBAPENEM RESISTANCE GENE): NOT DETECTED
LACTIC ACID: 1 MMOL/L (ref 0.5–2)
LIPASE: 18.4 U/L (ref 5.6–51.3)
LISTERIA MONOCYTOGENES: NOT DETECTED
MCH RBC QN AUTO: 28.7 PG (ref 26–33)
MCHC RBC AUTO-ENTMCNC: 32.5 GM/DL (ref 32.2–35.5)
MCR-1: NOT DETECTED
MCV RBC AUTO: 88.3 FL (ref 81–99)
MECA/C AND MREJ (MRSA): ABNORMAL
MECA/C: ABNORMAL
NDM: NOT DETECTED
NEISSERIA MENINGITIDIS, NMNI: NOT DETECTED
OXA-48-LIKE: NOT DETECTED
PHOSPHORUS: 3 MG/DL (ref 2.4–4.7)
PLATELET # BLD: 279 THOU/MM3 (ref 130–400)
PMV BLD AUTO: 9.6 FL (ref 9.4–12.4)
POTASSIUM REFLEX MAGNESIUM: 3.9 MEQ/L (ref 3.5–5.2)
PROCALCITONIN: 35.83 NG/ML (ref 0.01–0.09)
PROTEUS SPP: NOT DETECTED
PSEUDOMONAS AERUGINOSA: NOT DETECTED
RBC # BLD: 3.59 MILL/MM3 (ref 4.2–5.4)
SALMONELLA SPECIES: NOT DETECTED
SERRATIA MARCESCENS: NOT DETECTED
SODIUM BLD-SCNC: 136 MEQ/L (ref 135–145)
SOURCE OF BLOOD CULTURE: ABNORMAL
STAPHYLOCOCCUS AUREUS: NOT DETECTED
STAPHYLOCOCCUS EPIDERMIDIS: NOT DETECTED
STAPHYLOCOCCUS LUGDUNENSIS: NOT DETECTED
STAPHYLOCOCCUS SPP: NOT DETECTED
STENOTROPHOMONAS MALTOPHILIA: NOT DETECTED
STREPTOCOCCUS AGALACTIAE (GROUP B): NOT DETECTED
STREPTOCOCCUS PNEUMONIAE: NOT DETECTED
STREPTOCOCCUS PYOGENES (GROUP A): NOT DETECTED
STREPTOCOCCUS SPP: NOT DETECTED
TOTAL PROTEIN: 5.3 G/DL (ref 6.1–8)
VIM: NOT DETECTED
WBC # BLD: 14.4 THOU/MM3 (ref 4.8–10.8)

## 2022-12-28 PROCEDURE — 2580000003 HC RX 258

## 2022-12-28 PROCEDURE — 0FT44ZZ RESECTION OF GALLBLADDER, PERCUTANEOUS ENDOSCOPIC APPROACH: ICD-10-PCS | Performed by: SURGERY

## 2022-12-28 PROCEDURE — 1200000000 HC SEMI PRIVATE

## 2022-12-28 PROCEDURE — 2580000003 HC RX 258: Performed by: SURGERY

## 2022-12-28 PROCEDURE — 7100000001 HC PACU RECOVERY - ADDTL 15 MIN: Performed by: SURGERY

## 2022-12-28 PROCEDURE — 6360000002 HC RX W HCPCS

## 2022-12-28 PROCEDURE — 6370000000 HC RX 637 (ALT 250 FOR IP): Performed by: SURGERY

## 2022-12-28 PROCEDURE — 2500000003 HC RX 250 WO HCPCS: Performed by: SURGERY

## 2022-12-28 PROCEDURE — 6360000002 HC RX W HCPCS: Performed by: SURGERY

## 2022-12-28 PROCEDURE — 88304 TISSUE EXAM BY PATHOLOGIST: CPT

## 2022-12-28 PROCEDURE — 84100 ASSAY OF PHOSPHORUS: CPT

## 2022-12-28 PROCEDURE — 1200000003 HC TELEMETRY R&B

## 2022-12-28 PROCEDURE — 3700000001 HC ADD 15 MINUTES (ANESTHESIA): Performed by: SURGERY

## 2022-12-28 PROCEDURE — 2720000010 HC SURG SUPPLY STERILE: Performed by: SURGERY

## 2022-12-28 PROCEDURE — 83690 ASSAY OF LIPASE: CPT

## 2022-12-28 PROCEDURE — 6360000002 HC RX W HCPCS: Performed by: ANESTHESIOLOGY

## 2022-12-28 PROCEDURE — 36415 COLL VENOUS BLD VENIPUNCTURE: CPT

## 2022-12-28 PROCEDURE — 3600000009 HC SURGERY ROBOT BASE: Performed by: SURGERY

## 2022-12-28 PROCEDURE — C1889 IMPLANT/INSERT DEVICE, NOC: HCPCS | Performed by: SURGERY

## 2022-12-28 PROCEDURE — 84145 PROCALCITONIN (PCT): CPT

## 2022-12-28 PROCEDURE — 2709999900 HC NON-CHARGEABLE SUPPLY: Performed by: SURGERY

## 2022-12-28 PROCEDURE — 3700000000 HC ANESTHESIA ATTENDED CARE: Performed by: SURGERY

## 2022-12-28 PROCEDURE — 85027 COMPLETE CBC AUTOMATED: CPT

## 2022-12-28 PROCEDURE — 8E0W4CZ ROBOTIC ASSISTED PROCEDURE OF TRUNK REGION, PERCUTANEOUS ENDOSCOPIC APPROACH: ICD-10-PCS | Performed by: SURGERY

## 2022-12-28 PROCEDURE — 97110 THERAPEUTIC EXERCISES: CPT

## 2022-12-28 PROCEDURE — 80053 COMPREHEN METABOLIC PANEL: CPT

## 2022-12-28 PROCEDURE — 83605 ASSAY OF LACTIC ACID: CPT

## 2022-12-28 PROCEDURE — S2900 ROBOTIC SURGICAL SYSTEM: HCPCS | Performed by: SURGERY

## 2022-12-28 PROCEDURE — 7100000000 HC PACU RECOVERY - FIRST 15 MIN: Performed by: SURGERY

## 2022-12-28 PROCEDURE — 97166 OT EVAL MOD COMPLEX 45 MIN: CPT

## 2022-12-28 PROCEDURE — 3600000019 HC SURGERY ROBOT ADDTL 15MIN: Performed by: SURGERY

## 2022-12-28 PROCEDURE — 2500000003 HC RX 250 WO HCPCS

## 2022-12-28 DEVICE — CLIP INT L POLYMER LOK LIG HEM O LOK: Type: IMPLANTABLE DEVICE | Status: FUNCTIONAL

## 2022-12-28 RX ORDER — SODIUM CHLORIDE 9 MG/ML
INJECTION, SOLUTION INTRAVENOUS PRN
Status: DISCONTINUED | OUTPATIENT
Start: 2022-12-28 | End: 2022-12-30 | Stop reason: HOSPADM

## 2022-12-28 RX ORDER — HYDROCODONE BITARTRATE AND ACETAMINOPHEN 5; 325 MG/1; MG/1
1 TABLET ORAL EVERY 4 HOURS PRN
Status: DISCONTINUED | OUTPATIENT
Start: 2022-12-28 | End: 2022-12-30 | Stop reason: HOSPADM

## 2022-12-28 RX ORDER — ROCURONIUM BROMIDE 10 MG/ML
INJECTION, SOLUTION INTRAVENOUS PRN
Status: DISCONTINUED | OUTPATIENT
Start: 2022-12-28 | End: 2022-12-28 | Stop reason: SDUPTHER

## 2022-12-28 RX ORDER — FENTANYL CITRATE 50 UG/ML
INJECTION, SOLUTION INTRAMUSCULAR; INTRAVENOUS PRN
Status: DISCONTINUED | OUTPATIENT
Start: 2022-12-28 | End: 2022-12-28 | Stop reason: SDUPTHER

## 2022-12-28 RX ORDER — ONDANSETRON 4 MG/1
4 TABLET, ORALLY DISINTEGRATING ORAL EVERY 8 HOURS PRN
Status: DISCONTINUED | OUTPATIENT
Start: 2022-12-28 | End: 2022-12-30 | Stop reason: HOSPADM

## 2022-12-28 RX ORDER — MORPHINE SULFATE 2 MG/ML
4 INJECTION, SOLUTION INTRAMUSCULAR; INTRAVENOUS
Status: DISCONTINUED | OUTPATIENT
Start: 2022-12-28 | End: 2022-12-30 | Stop reason: HOSPADM

## 2022-12-28 RX ORDER — SODIUM CHLORIDE 9 MG/ML
INJECTION, SOLUTION INTRAVENOUS PRN
Status: DISCONTINUED | OUTPATIENT
Start: 2022-12-28 | End: 2022-12-28 | Stop reason: HOSPADM

## 2022-12-28 RX ORDER — LABETALOL HYDROCHLORIDE 5 MG/ML
10 INJECTION, SOLUTION INTRAVENOUS
Status: DISCONTINUED | OUTPATIENT
Start: 2022-12-28 | End: 2022-12-28 | Stop reason: HOSPADM

## 2022-12-28 RX ORDER — SODIUM CHLORIDE 0.9 % (FLUSH) 0.9 %
5-40 SYRINGE (ML) INJECTION EVERY 12 HOURS SCHEDULED
Status: DISCONTINUED | OUTPATIENT
Start: 2022-12-28 | End: 2022-12-30 | Stop reason: HOSPADM

## 2022-12-28 RX ORDER — FENTANYL CITRATE 50 UG/ML
INJECTION, SOLUTION INTRAMUSCULAR; INTRAVENOUS
Status: COMPLETED
Start: 2022-12-28 | End: 2022-12-28

## 2022-12-28 RX ORDER — SODIUM CHLORIDE 0.9 % (FLUSH) 0.9 %
5-40 SYRINGE (ML) INJECTION PRN
Status: DISCONTINUED | OUTPATIENT
Start: 2022-12-28 | End: 2022-12-30 | Stop reason: HOSPADM

## 2022-12-28 RX ORDER — INDOCYANINE GREEN AND WATER 25 MG
KIT INJECTION PRN
Status: DISCONTINUED | OUTPATIENT
Start: 2022-12-28 | End: 2022-12-28 | Stop reason: ALTCHOICE

## 2022-12-28 RX ORDER — FENTANYL CITRATE 50 UG/ML
50 INJECTION, SOLUTION INTRAMUSCULAR; INTRAVENOUS EVERY 5 MIN PRN
Status: COMPLETED | OUTPATIENT
Start: 2022-12-28 | End: 2022-12-28

## 2022-12-28 RX ORDER — LIDOCAINE HYDROCHLORIDE 20 MG/ML
INJECTION, SOLUTION EPIDURAL; INFILTRATION; INTRACAUDAL; PERINEURAL PRN
Status: DISCONTINUED | OUTPATIENT
Start: 2022-12-28 | End: 2022-12-28 | Stop reason: SDUPTHER

## 2022-12-28 RX ORDER — ONDANSETRON 2 MG/ML
INJECTION INTRAMUSCULAR; INTRAVENOUS PRN
Status: DISCONTINUED | OUTPATIENT
Start: 2022-12-28 | End: 2022-12-28 | Stop reason: SDUPTHER

## 2022-12-28 RX ORDER — SODIUM CHLORIDE 9 MG/ML
INJECTION, SOLUTION INTRAVENOUS CONTINUOUS PRN
Status: DISCONTINUED | OUTPATIENT
Start: 2022-12-28 | End: 2022-12-28 | Stop reason: SDUPTHER

## 2022-12-28 RX ORDER — SODIUM CHLORIDE 0.9 % (FLUSH) 0.9 %
5-40 SYRINGE (ML) INJECTION PRN
Status: DISCONTINUED | OUTPATIENT
Start: 2022-12-28 | End: 2022-12-28 | Stop reason: HOSPADM

## 2022-12-28 RX ORDER — ENOXAPARIN SODIUM 100 MG/ML
40 INJECTION SUBCUTANEOUS DAILY
Status: DISCONTINUED | OUTPATIENT
Start: 2022-12-29 | End: 2022-12-30 | Stop reason: HOSPADM

## 2022-12-28 RX ORDER — HYOSCYAMINE SULFATE 0.125 MG
125 TABLET,DISINTEGRATING ORAL EVERY 4 HOURS PRN
Status: DISCONTINUED | OUTPATIENT
Start: 2022-12-28 | End: 2022-12-30 | Stop reason: HOSPADM

## 2022-12-28 RX ORDER — ONDANSETRON 2 MG/ML
4 INJECTION INTRAMUSCULAR; INTRAVENOUS EVERY 6 HOURS PRN
Status: DISCONTINUED | OUTPATIENT
Start: 2022-12-28 | End: 2022-12-30 | Stop reason: HOSPADM

## 2022-12-28 RX ORDER — MORPHINE SULFATE 2 MG/ML
2 INJECTION, SOLUTION INTRAMUSCULAR; INTRAVENOUS EVERY 5 MIN PRN
Status: DISCONTINUED | OUTPATIENT
Start: 2022-12-28 | End: 2022-12-28 | Stop reason: HOSPADM

## 2022-12-28 RX ORDER — BUPIVACAINE HYDROCHLORIDE AND EPINEPHRINE 5; 5 MG/ML; UG/ML
INJECTION, SOLUTION EPIDURAL; INTRACAUDAL; PERINEURAL PRN
Status: DISCONTINUED | OUTPATIENT
Start: 2022-12-28 | End: 2022-12-28 | Stop reason: ALTCHOICE

## 2022-12-28 RX ORDER — SODIUM CHLORIDE 9 MG/ML
INJECTION, SOLUTION INTRAVENOUS CONTINUOUS
Status: DISCONTINUED | OUTPATIENT
Start: 2022-12-28 | End: 2022-12-29

## 2022-12-28 RX ORDER — SODIUM CHLORIDE 0.9 % (FLUSH) 0.9 %
5-40 SYRINGE (ML) INJECTION EVERY 12 HOURS SCHEDULED
Status: DISCONTINUED | OUTPATIENT
Start: 2022-12-28 | End: 2022-12-28 | Stop reason: HOSPADM

## 2022-12-28 RX ORDER — KETOROLAC TROMETHAMINE 30 MG/ML
INJECTION, SOLUTION INTRAMUSCULAR; INTRAVENOUS PRN
Status: DISCONTINUED | OUTPATIENT
Start: 2022-12-28 | End: 2022-12-28 | Stop reason: SDUPTHER

## 2022-12-28 RX ORDER — MORPHINE SULFATE 2 MG/ML
2 INJECTION, SOLUTION INTRAMUSCULAR; INTRAVENOUS
Status: DISCONTINUED | OUTPATIENT
Start: 2022-12-28 | End: 2022-12-30 | Stop reason: HOSPADM

## 2022-12-28 RX ORDER — PROPOFOL 10 MG/ML
INJECTION, EMULSION INTRAVENOUS PRN
Status: DISCONTINUED | OUTPATIENT
Start: 2022-12-28 | End: 2022-12-28 | Stop reason: SDUPTHER

## 2022-12-28 RX ORDER — 0.9 % SODIUM CHLORIDE 0.9 %
500 INTRAVENOUS SOLUTION INTRAVENOUS ONCE
Status: DISCONTINUED | OUTPATIENT
Start: 2022-12-28 | End: 2022-12-30 | Stop reason: HOSPADM

## 2022-12-28 RX ORDER — DEXAMETHASONE SODIUM PHOSPHATE 10 MG/ML
INJECTION, EMULSION INTRAMUSCULAR; INTRAVENOUS PRN
Status: DISCONTINUED | OUTPATIENT
Start: 2022-12-28 | End: 2022-12-28 | Stop reason: SDUPTHER

## 2022-12-28 RX ORDER — HYDROCODONE BITARTRATE AND ACETAMINOPHEN 5; 325 MG/1; MG/1
2 TABLET ORAL EVERY 4 HOURS PRN
Status: DISCONTINUED | OUTPATIENT
Start: 2022-12-28 | End: 2022-12-30 | Stop reason: HOSPADM

## 2022-12-28 RX ADMIN — LIDOCAINE HYDROCHLORIDE 80 MG: 20 INJECTION, SOLUTION EPIDURAL; INFILTRATION; INTRACAUDAL; PERINEURAL at 13:23

## 2022-12-28 RX ADMIN — HYDROMORPHONE HYDROCHLORIDE 0.5 MG: 1 INJECTION, SOLUTION INTRAMUSCULAR; INTRAVENOUS; SUBCUTANEOUS at 16:31

## 2022-12-28 RX ADMIN — ONDANSETRON 4 MG: 2 INJECTION INTRAMUSCULAR; INTRAVENOUS at 14:53

## 2022-12-28 RX ADMIN — KETOROLAC TROMETHAMINE 15 MG: 30 INJECTION, SOLUTION INTRAMUSCULAR; INTRAVENOUS at 14:58

## 2022-12-28 RX ADMIN — ROCURONIUM BROMIDE 20 MG: 10 INJECTION, SOLUTION INTRAVENOUS at 14:29

## 2022-12-28 RX ADMIN — ROCURONIUM BROMIDE 50 MG: 10 INJECTION, SOLUTION INTRAVENOUS at 13:23

## 2022-12-28 RX ADMIN — FENTANYL CITRATE 50 MCG: 50 INJECTION, SOLUTION INTRAMUSCULAR; INTRAVENOUS at 15:16

## 2022-12-28 RX ADMIN — SODIUM CHLORIDE: 9 INJECTION, SOLUTION INTRAVENOUS at 13:17

## 2022-12-28 RX ADMIN — DEXAMETHASONE SODIUM PHOSPHATE 5 MG: 10 INJECTION, EMULSION INTRAMUSCULAR; INTRAVENOUS at 13:33

## 2022-12-28 RX ADMIN — PROPOFOL 150 MG: 10 INJECTION, EMULSION INTRAVENOUS at 13:23

## 2022-12-28 RX ADMIN — SODIUM CHLORIDE: 9 INJECTION, SOLUTION INTRAVENOUS at 14:20

## 2022-12-28 RX ADMIN — SODIUM CHLORIDE, PRESERVATIVE FREE 10 ML: 5 INJECTION INTRAVENOUS at 08:44

## 2022-12-28 RX ADMIN — PIPERACILLIN AND TAZOBACTAM 3375 MG: 3; .375 INJECTION, POWDER, FOR SOLUTION INTRAVENOUS at 04:09

## 2022-12-28 RX ADMIN — HYDROCODONE BITARTRATE AND ACETAMINOPHEN 2 TABLET: 5; 325 TABLET ORAL at 18:27

## 2022-12-28 RX ADMIN — FENTANYL CITRATE 50 MCG: 50 INJECTION, SOLUTION INTRAMUSCULAR; INTRAVENOUS at 13:23

## 2022-12-28 RX ADMIN — HYDROMORPHONE HYDROCHLORIDE 0.5 MG: 1 INJECTION, SOLUTION INTRAMUSCULAR; INTRAVENOUS; SUBCUTANEOUS at 20:44

## 2022-12-28 RX ADMIN — FENTANYL CITRATE 50 MCG: 50 INJECTION, SOLUTION INTRAMUSCULAR; INTRAVENOUS at 15:25

## 2022-12-28 RX ADMIN — FENTANYL CITRATE 50 MCG: 50 INJECTION, SOLUTION INTRAMUSCULAR; INTRAVENOUS at 13:51

## 2022-12-28 RX ADMIN — SUGAMMADEX 200 MG: 100 INJECTION, SOLUTION INTRAVENOUS at 14:51

## 2022-12-28 RX ADMIN — PIPERACILLIN AND TAZOBACTAM 3375 MG: 3; .375 INJECTION, POWDER, FOR SOLUTION INTRAVENOUS at 13:33

## 2022-12-28 RX ADMIN — Medication 500 ML: at 10:54

## 2022-12-28 RX ADMIN — PIPERACILLIN AND TAZOBACTAM 3375 MG: 3; .375 INJECTION, POWDER, FOR SOLUTION INTRAVENOUS at 20:29

## 2022-12-28 ASSESSMENT — PAIN DESCRIPTION - FREQUENCY: FREQUENCY: INTERMITTENT

## 2022-12-28 ASSESSMENT — PAIN DESCRIPTION - ORIENTATION
ORIENTATION: RIGHT

## 2022-12-28 ASSESSMENT — PAIN SCALES - GENERAL
PAINLEVEL_OUTOF10: 4
PAINLEVEL_OUTOF10: 8
PAINLEVEL_OUTOF10: 4
PAINLEVEL_OUTOF10: 8
PAINLEVEL_OUTOF10: 9
PAINLEVEL_OUTOF10: 7
PAINLEVEL_OUTOF10: 7
PAINLEVEL_OUTOF10: 8
PAINLEVEL_OUTOF10: 8

## 2022-12-28 ASSESSMENT — PAIN - FUNCTIONAL ASSESSMENT: PAIN_FUNCTIONAL_ASSESSMENT: ACTIVITIES ARE NOT PREVENTED

## 2022-12-28 ASSESSMENT — PAIN DESCRIPTION - DESCRIPTORS
DESCRIPTORS: SHARP

## 2022-12-28 ASSESSMENT — PAIN DESCRIPTION - LOCATION
LOCATION: ABDOMEN

## 2022-12-28 ASSESSMENT — PAIN DESCRIPTION - PAIN TYPE: TYPE: SURGICAL PAIN

## 2022-12-28 ASSESSMENT — PAIN DESCRIPTION - ONSET: ONSET: ON-GOING

## 2022-12-28 NOTE — PLAN OF CARE
Problem: Discharge Planning  Goal: Discharge to home or other facility with appropriate resources  12/28/2022 0012 by August Horne RN  Outcome: Progressing  Flowsheets (Taken 12/27/2022 1954)  Discharge to home or other facility with appropriate resources:   Identify barriers to discharge with patient and caregiver   Arrange for needed discharge resources and transportation as appropriate   Identify discharge learning needs (meds, wound care, etc)   Refer to discharge planning if patient needs post-hospital services based on physician order or complex needs related to functional status, cognitive ability or social support system     Problem: Pain  Goal: Verbalizes/displays adequate comfort level or baseline comfort level  12/28/2022 0012 by August Horne RN  Outcome: Progressing  Flowsheets (Taken 12/28/2022 0012)  Verbalizes/displays adequate comfort level or baseline comfort level:   Encourage patient to monitor pain and request assistance   Administer analgesics based on type and severity of pain and evaluate response   Consider cultural and social influences on pain and pain management   Assess pain using appropriate pain scale   Implement non-pharmacological measures as appropriate and evaluate response     Problem: Skin/Tissue Integrity  Goal: Absence of new skin breakdown  Description: 1. Monitor for areas of redness and/or skin breakdown  2. Assess vascular access sites hourly  3. Every 4-6 hours minimum:  Change oxygen saturation probe site  4. Every 4-6 hours:  If on nasal continuous positive airway pressure, respiratory therapy assess nares and determine need for appliance change or resting period. 12/28/2022 0012 by August Horne RN  Outcome: Progressing  Note: No new skin breakdown noted on assessment.        Problem: Safety - Adult  Goal: Free from fall injury  12/28/2022 0012 by August Horne RN  Outcome: Progressing  Flowsheets (Taken 12/28/2022 0012)  Free From Fall Injury: Instruct family/caregiver on patient safety     Problem: ABCDS Injury Assessment  Goal: Absence of physical injury  12/28/2022 0012 by Ji Momin RN  Outcome: Progressing  Flowsheets (Taken 12/28/2022 0012)  Absence of Physical Injury: Implement safety measures based on patient assessment     Problem: Gastrointestinal - Adult  Goal: Minimal or absence of nausea and vomiting  12/28/2022 0012 by Ji Momin RN  Outcome: Progressing  Flowsheets (Taken 12/27/2022 1954)  Minimal or absence of nausea and vomiting:   Administer IV fluids as ordered to ensure adequate hydration   Administer ordered antiemetic medications as needed   Provide nonpharmacologic comfort measures as appropriate     Problem: Gastrointestinal - Adult  Goal: Maintains adequate nutritional intake  12/28/2022 0012 by Ji Momin RN  Outcome: Progressing  Flowsheets (Taken 12/27/2022 1954)  Maintains adequate nutritional intake:   Monitor percentage of each meal consumed   Identify factors contributing to decreased intake, treat as appropriate   Assist with meals as needed   Monitor intake and output, weight and lab values   Care plan reviewed with patient. Patient verbalizes understanding of the care plan and contributed to goal setting.

## 2022-12-28 NOTE — ANESTHESIA POSTPROCEDURE EVALUATION
Department of Anesthesiology  Postprocedure Note    Patient: Evan Mejia  MRN: [id number]  YOB: 1950  Date of evaluation: 12/28/2022      Procedure Summary     Date: 12/28/22 Room / Location: [address] [address] / Critical access hospital    Anesthesia Start: 1317 Anesthesia Stop: 1504    Procedure: ROBOTIC CHOLECYSTECTOMY Diagnosis:       Generalized abdominal pain      (Generalized abdominal pain [R10.84])    Surgeons: Brad Hardy MD Responsible Provider: Gloria Pichardo MD    Anesthesia Type: General ASA Status: 3          Anesthesia Type: General    Dionna Phase I: Dionna Score: 9    Dionna Phase II:        Anesthesia Post Evaluation    Patient location during evaluation: PACU  Patient participation: complete - patient participated  Level of consciousness: awake  Airway patency: patent  Nausea & Vomiting: no vomiting and no nausea  Complications: no  Cardiovascular status: hemodynamically stable  Respiratory status: acceptable and nasal cannula  Hydration status: stable

## 2022-12-28 NOTE — PLAN OF CARE
Problem: Discharge Planning  Goal: Discharge to home or other facility with appropriate resources  12/28/2022 1103 by ZA Cavazos  Outcome: Progressing      Consult received. Please see SW note dated 12/28.

## 2022-12-28 NOTE — PROGRESS NOTES
1505- Pt to PACU. 4 lap sites with steri-strips and band-aids. VSS on RA. SCDs and ice pack applied. Denies pain and nausea. 1515- Pain 8/10, 50mcg fentanyl given. 1525- Pt states pain 9/10, 50mcg fentanyl given. 1535- Pt resting with eyes closed, no acute distress noted.  VSS on RA.    1545- Pt met PACU discharge criteria, called report to [facility]

## 2022-12-28 NOTE — ANESTHESIA POSTPROCEDURE EVALUATION
Department of Anesthesiology  Postprocedure Note    Patient: Amy Lazo  MRN: [id number]  YOB: 1950  Date of evaluation: 12/28/2022      Procedure Summary     Date: 12/27/22 Room / Location: [facility] / [facility]    Anesthesia Start: 5847 Anesthesia Stop: 8184    Procedure: ERCP Diagnosis:       Common bile duct stone      (Common bile duct stone [K80.50])    Surgeons: Carol Rocha MD Responsible Provider: Anitha Glass MD    Anesthesia Type: general ASA Status: 2          Anesthesia Type: No value filed.     Dionna Phase I: Dionna Score: 8    Dionna Phase II:        Anesthesia Post Evaluation    Complications: no

## 2022-12-28 NOTE — ANESTHESIA PRE PROCEDURE
Department of Anesthesiology  Preprocedure Note       Name:  Teresa Marin   Age:  67 y.o.  :  1950                                          MRN:  [id number]         Date:  2022      Surgeon: Macario Hanson):  Jada Onofre MD    Procedure: Procedure(s):  ROBOTIC CHOLECYSTECTOMY POSS OPEN    Medications prior to admission:   Prior to Admission medications    Medication Sig Start Date End Date Taking? Authorizing Provider   aspirin 325 MG tablet Take 325 mg by mouth daily   Yes Historical Provider, MD   omeprazole (PRILOSEC) 20 MG delayed release capsule Take 20 mg by mouth daily   Yes Historical Provider, MD   traMADol (ULTRAM) 50 MG tablet Take 50 mg by mouth every 8 hours as needed for Pain. Patient not taking: Reported on 2022    Historical Provider, MD   amLODIPine (NORVASC) 2.5 MG tablet Take 1 tablet by mouth daily 10/21/22   Jennifer Mallory MD   cyclobenzaprine (FLEXERIL) 5 MG tablet Take 5 mg by mouth 3 times daily as needed for Muscle spasms  Patient not taking: Reported on 2022    Historical Provider, MD   ALPRAZolam Janelaine Rivera) 0.25 MG tablet Take 0.25 mg by mouth 2 times daily as needed for Anxiety. Historical Provider, MD   acetaminophen (TYLENOL) 650 MG extended release tablet Take 650 mg by mouth every 8 hours as needed for Pain    Historical Provider, MD   vitamin D 25 MCG (1000 UT) CAPS Take by mouth    Historical Provider, MD   famotidine (PEPCID) 20 MG tablet Take 20 mg by mouth daily    Historical Provider, MD   diphenhydrAMINE (BENADRYL) 25 MG tablet Take 25 mg by mouth every 6 hours as needed for Itching or Allergies    Historical Provider, MD   docusate sodium (COLACE) 100 MG capsule Take 100 mg by mouth daily    Historical Provider, MD   Multiple Vitamins-Minerals (THERAPEUTIC MULTIVITAMIN-MINERALS) tablet Take 1 tablet by mouth daily.     Historical Provider, MD       Current medications:    Current Facility-Administered Medications   Medication Dose Route Frequency Provider Last Rate Last Admin    piperacillin-tazobactam (ZOSYN) 3,375 mg in dextrose 5 % 50 mL IVPB extended infusion (mini-bag)  3,375 mg IntraVENous On Call to 50  Haile Bajwa MD        0.9 % sodium chloride bolus  500 mL IntraVENous Once Arlene STACY Lebron - CNP        sodium chloride flush 0.9 % injection 5-40 mL  5-40 mL IntraVENous 2 times per day Lisa Story MD   10 mL at 12/28/22 0844    sodium chloride flush 0.9 % injection 5-40 mL  5-40 mL IntraVENous PRN Lisa Story MD        0.9 % sodium chloride infusion   IntraVENous PRN Lisa Story MD        ondansetron (ZOFRAN-ODT) disintegrating tablet 4 mg  4 mg Oral Q8H PRN Lisa Story MD        Or    ondansetron [facility]) injection 4 mg  4 mg IntraVENous Q6H PRN Lisa Story MD        0.9 % sodium chloride infusion   IntraVENous Continuous Lisa Story  mL/hr at 12/28/22 0546 Rate Verify at 12/28/22 0546    piperacillin-tazobactam (ZOSYN) 3,375 mg in dextrose 5 % 50 mL IVPB extended infusion (mini-bag)  3,375 mg IntraVENous Q8H Lisa Story MD   Stopped at 12/28/22 4126    HYDROmorphone (DILAUDID) injection 0.25 mg  0.25 mg IntraVENous Q3H PRN Lisa Story MD        Or    HYDROmorphone (DILAUDID) injection 0.5 mg  0.5 mg IntraVENous Q3H PRN Lisa Story MD   0.5 mg at 12/27/22 1334    ALPRAZolam (XANAX) tablet 0.25 mg  0.25 mg Oral BID PRN STACY Longoria CNP        amLODIPine (NORVASC) tablet 2.5 mg  2.5 mg Oral Daily Arlene Borlolly, APRN - CNP   2.5 mg at 12/27/22 1723    acetaminophen (TYLENOL) tablet 650 mg  650 mg Oral Q8H PRN STACY Longoria - CNP        HYDROcodone-acetaminophen (NORCO) 5-325 MG per tablet 1 tablet  1 tablet Oral Q4H PRN Arlene Bernardino, APRN - CNP   1 tablet at 12/27/22 1722    Or    HYDROcodone-acetaminophen (NORCO) 5-325 MG per tablet 2 tablet  2 tablet Oral Q4H PRN STACY Longoria CNP           Allergies: Allergies   Allergen Reactions    Codeine Nausea And Vomiting       Problem List:    Patient Active Problem List   Diagnosis Code    [facility]chronic obstructive [facility]) ([facility] J44.9    Osteoarthritis of multiple joints M15.9    Hypertension I10    Lumbar stenosis with neurogenic claudication M48.062    BRYCE (generalized anxiety disorder) F41.1    Acute cholecystitis K81.0    Right upper quadrant abdominal pain R10.11       Past Medical History:        Diagnosis Date    Anxiety     Arthritis     Cancer ([facility] 1978    CERVICAL CANCER    COPD (chronic obstructive pulmonary disease) ([facility] 05/24/2021    Endometrial cancer ([facility]     cervical CA at age 32    Female bladder prolapse     with rectocele    Hypertension        Past Surgical History:        Procedure Laterality Date    BACK SURGERY  1976    COLONOSCOPY      HYSTERECTOMY (CERVIX STATUS UNKNOWN)  1978    JOINT REPLACEMENT Left 05/18/2017    Lt total shoulder , Dr Keenan Ayala N/A 8/12/2022    L1-5 Decompression L1-5 Posterior Fusion performed by Mervin Carrasco MD at [facility] 02/2018       Social History:    Social History     Tobacco Use    Smoking status: Former     Packs/day: 0.50     Years: 44.00     Pack years: 22.00     Types: Cigarettes     Start date: 1/1/1970     Quit date: 2008     Years since quitting: 15.0    Smokeless tobacco: Never   Substance Use Topics    Alcohol use:  Yes     Alcohol/week: 4.0 standard drinks     Types: 2 Glasses of wine, 2 Shots of liquor per week     Comment: A WEEK, GLASS OF WINE on saturday                                Counseling given: Not Answered      Vital Signs (Current):   Vitals:    12/27/22 2327 12/28/22 0338 12/28/22 0847 12/28/22 1116   BP: 94/61 95/75 100/75 94/67   Pulse: 69 79 73 78   Resp: 14 16 16 16   Temp:  97.3 °F (36.3 °C) 97.6 °F (36.4 °C) 97.7 °F (36.5 °C)   TempSrc:  Oral Oral Oral   SpO2: 97% 97% 100% 98% Weight:  173 lb 11.6 oz (78.8 kg)     Height:                                                  BP Readings from Last 3 Encounters:   12/28/22 94/67   12/06/22 136/80   10/21/22 134/84       NPO Status: Time of last liquid consumption: 2000                        Time of last solid consumption: 2000                        Date of last liquid consumption: 12/26/22                        Date of last solid food consumption: 12/26/22    BMI:   Wt Readings from Last 3 Encounters:   12/28/22 173 lb 11.6 oz (78.8 kg)   10/21/22 158 lb (71.7 kg)   08/12/22 162 lb 9.6 oz (73.8 kg)     Body mass index is 31.77 kg/m². CBC:   Lab Results   Component Value Date/Time    WBC 14.4 12/28/2022 05:52 AM    RBC 3.59 12/28/2022 05:52 AM    RBC 4.49 07/15/2011 03:09 PM    HGB 10.3 12/28/2022 05:52 AM    HCT 31.7 12/28/2022 05:52 AM    MCV 88.3 12/28/2022 05:52 AM    RDW 14.3 08/10/2017 11:55 AM     12/28/2022 05:52 AM       CMP:   Lab Results   Component Value Date/Time     12/28/2022 05:52 AM    K 3.9 12/28/2022 05:52 AM     12/28/2022 05:52 AM    CO2 23 12/28/2022 05:52 AM    BUN 13 12/28/2022 05:52 AM    CREATININE 0.4 12/28/2022 05:52 AM    LABGLOM >60 12/28/2022 05:52 AM    GLUCOSE 132 12/28/2022 05:52 AM    GLUCOSE 92 07/15/2011 03:09 PM    PROT 5.3 12/28/2022 05:52 AM    CALCIUM 7.3 12/28/2022 05:52 AM    BILITOT 0.3 12/28/2022 05:52 AM    ALKPHOS 142 12/28/2022 05:52 AM    AST 20 12/28/2022 05:52 AM    ALT 16 12/28/2022 05:52 AM       POC Tests: No results for input(s): POCGLU, POCNA, POCK, POCCL, POCBUN, POCHEMO, POCHCT in the last 72 hours.     Coags:   Lab Results   Component Value Date/Time    INR 0.84 07/21/2022 10:34 AM    APTT 29.8 07/21/2022 10:34 AM       HCG (If Applicable): No results found for: PREGTESTUR, PREGSERUM, HCG, HCGQUANT     ABGs: No results found for: PHART, PO2ART, OYS7XJV, XUT1RTF, BEART, R7FNAOCK     Type & Screen (If Applicable):  Lab Results   Component Value Date    LABRH POS 08/12/2022       Drug/Infectious Status (If Applicable):  Lab Results   Component Value Date/Time    HEPCAB Negative 08/10/2017 11:55 AM       COVID-19 Screening (If Applicable):   Lab Results   Component Value Date/Time    COVID19 NOT DETECTED 08/19/2022 11:55 AM           Anesthesia Evaluation    Airway: Mallampati: II  TM distance: >3 FB   Neck ROM: full  Mouth opening: > = 3 FB   Dental:          Pulmonary: breath sounds clear to auscultation  (+) COPD:                             Cardiovascular:    (+) hypertension:,         Rhythm: regular                      Neuro/Psych:   (+) psychiatric history:            GI/Hepatic/Renal:             Endo/Other:                     Abdominal:   (+) obese,           Vascular: Other Findings:           Anesthesia Plan      general     ASA 3     (Pt. Had hip surgery  2 weeks ago)  Induction: intravenous. MIPS: Postoperative opioids intended and Prophylactic antiemetics administered. Anesthetic plan and risks discussed with patient. Plan discussed with CRNA.                     Amber Oswald MD   12/28/2022

## 2022-12-28 NOTE — PROGRESS NOTES
[facility]  PHYSICAL THERAPY MISSED TREATMENT NOTE  [facility] OR    Date: 2022  Patient Name: Alcides Amaya        MRN: [id number]   : 1950  (73 y.o.)  Gender: female                REASON FOR MISSED TREATMENT:  Patient at testing and/or off unit. Pt at surgery.  Will try back tomorrow

## 2022-12-28 NOTE — CARE COORDINATION
DISCHARGE PLANNING EVALUATION  12/28/22, 11:03 AM EST    Reason for Referral: Current with HH   Mental Status: Answered questions appropriately  Decision Making: Independent   Family/Social/Home Environment: Lives at home alone, has a supportive daughter   Current Services including food security, transportation and housekeeping: Current [facility] for RN and OT services, confirmed services with Wolf Castro at agency. Patient is fairly independent in care  Current Equipment: Walker, grab bars in bathroom  Payment Source: Medicare  Concerns or Barriers to Discharge:  None  Post-acute [facility]) provider list was provided to patient. Patient was informed of their freedom to choose [facility] provider. Discussed and offered to show the patient the relevant [facility] Providers quality and resource use measures on Medicare Compare web site via computer based on patient's goals of care and treatment preferences. Questions regarding selection process were answered. Teach Back Method used with Jessica Estrella regarding care plan and discharge planning  Patient verbalize understanding of the plan of care and contribute to goal setting. Patient goals, treatment preferences and discharge plan: Return to home with current [facility]. Current with RN and OT, would like to add PT back on. Need orders. Daughter will transport at discharge.     Electronically signed by ZA Cavazos on 12/28/2022 at 11:03 AM

## 2022-12-28 NOTE — PLAN OF CARE
Problem: Discharge Planning  Goal: Discharge to home or other facility with appropriate resources  12/28/2022 1114 by Nella Reveles RN  Outcome: Progressing  Flowsheets (Taken 12/28/2022 1114)  Discharge to home or other facility with appropriate resources: Identify barriers to discharge with patient and caregiver  12/28/2022 1103 by ZA Saunders  Outcome: Progressing  12/28/2022 0012 by Edmundo Taylor RN  Outcome: Progressing  Flowsheets (Taken 12/27/2022 1954)  Discharge to home or other facility with appropriate resources:   Identify barriers to discharge with patient and caregiver   Arrange for needed discharge resources and transportation as appropriate   Identify discharge learning needs (meds, wound care, etc)   Refer to discharge planning if patient needs post-hospital services based on physician order or complex needs related to functional status, cognitive ability or social support system     Problem: Pain  Goal: Verbalizes/displays adequate comfort level or baseline comfort level  12/28/2022 1114 by Nella Reveles RN  Outcome: Progressing  Flowsheets (Taken 12/28/2022 1114)  Verbalizes/displays adequate comfort level or baseline comfort level:   Encourage patient to monitor pain and request assistance   Assess pain using appropriate pain scale   Administer analgesics based on type and severity of pain and evaluate response  12/28/2022 0012 by Edmundo Taylor RN  Outcome: Progressing  Flowsheets (Taken 12/28/2022 0012)  Verbalizes/displays adequate comfort level or baseline comfort level:   Encourage patient to monitor pain and request assistance   Administer analgesics based on type and severity of pain and evaluate response   Consider cultural and social influences on pain and pain management   Assess pain using appropriate pain scale   Implement non-pharmacological measures as appropriate and evaluate response     Problem: Skin/Tissue Integrity  Goal: Absence of new skin breakdown  Description: 1. Monitor for areas of redness and/or skin breakdown  2. Assess vascular access sites hourly  3. Every 4-6 hours minimum:  Change oxygen saturation probe site  4. Every 4-6 hours:  If on nasal continuous positive airway pressure, respiratory therapy assess nares and determine need for appliance change or resting period. 12/28/2022 1114 by Daryl Vidal RN  Outcome: Progressing  Note: Zinc cream/mepilex applied to buttocks wound. Waffle cushion placed on chair. Encouraged pt to switch positions as much as possible  12/28/2022 0012 by Shady Schwartz RN  Outcome: Progressing  Note: No new skin breakdown noted on assessment.        Problem: Safety - Adult  Goal: Free from fall injury  12/28/2022 1114 by Daryl Vidal RN  Outcome: Progressing  12/28/2022 0012 by Shady Schwartz RN  Outcome: Progressing  Flowsheets (Taken 12/28/2022 0012)  Free From Fall Injury: Instruct family/caregiver on patient safety     Problem: ABCDS Injury Assessment  Goal: Absence of physical injury  12/28/2022 1114 by Daryl Vidal RN  Outcome: Progressing  12/28/2022 0012 by Shady Schwartz RN  Outcome: Progressing  Flowsheets (Taken 12/28/2022 0012)  Absence of Physical Injury: Implement safety measures based on patient assessment     Problem: Gastrointestinal - Adult  Goal: Minimal or absence of nausea and vomiting  12/28/2022 1114 by Daryl Vidal RN  Outcome: Progressing  Flowsheets (Taken 12/28/2022 1114)  Minimal or absence of nausea and vomiting: Administer IV fluids as ordered to ensure adequate hydration  12/28/2022 0012 by Shady Schwartz RN  Outcome: Progressing  Flowsheets (Taken 12/27/2022 1954)  Minimal or absence of nausea and vomiting:   Administer IV fluids as ordered to ensure adequate hydration   Administer ordered antiemetic medications as needed   Provide nonpharmacologic comfort measures as appropriate  Goal: Maintains adequate nutritional intake  12/28/2022 1114 by Daryl Vidal RN  Outcome: Progressing  12/28/2022 0012 by Tacos Cueto RN  Outcome: Progressing  Flowsheets (Taken 12/27/2022 1954)  Maintains adequate nutritional intake:   Monitor percentage of each meal consumed   Identify factors contributing to decreased intake, treat as appropriate   Assist with meals as needed   Monitor intake and output, weight and lab values

## 2022-12-28 NOTE — BRIEF OP NOTE
Brief Postoperative Note      Patient: Isaiah Fernandez  YOB: 1950  MRN: [id number]    Date of Procedure: 12/28/2022    Pre-Op Diagnosis: Acute calculus cholecystitis    Post-Op Diagnosis: Same       Procedure(s):  ROBOTIC CHOLECYSTECTOMY    Surgeon(s):  Deondre Wolfe MD    Assistant:  First Assistant: Anita Kim RN    Anesthesia: General/local    Estimated Blood Loss (mL): 59FI    Complications: None    Specimens:   ID Type Source Tests Collected by Time Destination   A : Gallbladder Tissue Gallbladder SURGICAL PATHOLOGY Deondre Wolfe MD 12/28/2022 1440        Implants:  * No implants in log *      Drains: * No LDAs found *    Findings: as above - see op note for details    Electronically signed by Deondre Wolfe MD on 12/28/2022 at 2:54 PM

## 2022-12-28 NOTE — PROGRESS NOTES
Gastroenterology Progress Note:     Patient Name:  Lexie Lozano   MRN: [id number]  [id number]  YOB: 1950  Admit Date: 12/27/2022  2:41 AM  Primary Care Physician: Patti Banerjee MD   [id number]     Patient seen and examined. 24 hours events and chart reviewed. Subjective: Patient sitting up in the chair. She has some pain to the RUQ, but states it is much improved. Denies n/v. She is scheduled for cholecystectomy this afternoon. ERCP results reviewed. Objective:  /75   Pulse 73   Temp 97.6 °F (36.4 °C) (Oral)   Resp 16   Ht 5' 2\" (1.575 m)   Wt 173 lb 11.6 oz (78.8 kg)   SpO2 100%   BMI 31.77 kg/m²     Physical Exam:    General:  Nourished in no distress  HEENT: Atraumatic, normocephalic. Moist oral mucous membranes. Neck: Supple without adenopathy, JVD, thyromegaly or masses. Trachea midline. CV: Heart RRR, no murmurs, rubs, gallops. Resp: Even, easy without cough or accessory use. Lungs clear to ascultation bilaterally. Abd: Round, soft, obese, slightly tender to RUQ with palpation. No hepatosplenomegaly or mass present. Active bowel sounds heard. No distention noted. Ext:  Without cyanosis, clubbing, edema. Skin: Pink, warm, dry  Neuro:  Alert, oriented x 3 with no obvious deficits.        Rectal: deferred    Labs:   CBC:   Lab Results   Component Value Date/Time    WBC 14.4 12/28/2022 05:52 AM    HGB 10.3 12/28/2022 05:52 AM    HCT 31.7 12/28/2022 05:52 AM    MCV 88.3 12/28/2022 05:52 AM     12/28/2022 05:52 AM     BMP:   Lab Results   Component Value Date/Time     12/28/2022 05:52 AM    K 3.9 12/28/2022 05:52 AM     12/28/2022 05:52 AM    CO2 23 12/28/2022 05:52 AM    PHOS 3.0 12/28/2022 05:52 AM    BUN 13 12/28/2022 05:52 AM    CREATININE 0.4 12/28/2022 05:52 AM    CALCIUM 7.3 12/28/2022 05:52 AM     PT/INR:   Lab Results   Component Value Date/Time    INR 0.84 07/21/2022 10:34 AM     Lipids:   Lab Results   Component Value Date/Time    [facility] 142 12/28/2022 05:52 AM    ALT 16 12/28/2022 05:52 AM    AST 20 12/28/2022 05:52 AM    BILITOT 0.3 12/28/2022 05:52 AM    BILIDIR <0.2 12/27/2022 03:15 AM    LABALBU 2.3 12/28/2022 05:52 AM    LABALBU 4.5 07/15/2011 03:09 PM    LIPASE 18.4 12/28/2022 05:52 AM     Significant Diagnostic Studies:   ERCP 12/27/22 by Dr. Maria Teresa Marrufo: otherwise normal limited endoscopic exam from the esophagus to the 2nd portion of the duodenum, periampullary diverticulum major papilla, 12 mm CBD, filling defect found in the cystic duct, stricture NOT found, successful biliary sphincterotomy, balloon cholangiogram & pull through yielding no stones/sludge    Current Meds:  Scheduled Meds:   piperacillin-tazobactam  3,375 mg IntraVENous On Call to OR    sodium chloride  500 mL IntraVENous Once    sodium chloride flush  5-40 mL IntraVENous 2 times per day    piperacillin-tazobactam  3,375 mg IntraVENous Q8H    amLODIPine  2.5 mg Oral Daily     Continuous Infusions:   sodium chloride      sodium chloride 125 mL/hr at 12/28/22 0546       Assessment:  Right-sided abdominal pain- improving  Nausea & vomiting- resolved  Acute cholecystitis- on ATBs  Leukocytosis- on ATBs  Dilated CBD with sludge- noted on MRCP  4 mm common hepatic duct stone s/p ERCP 12/27/22 with no stone noted  Mild pancreatic duct dilatation- noted on MRCP  Hepatic steatosis  Elevated alk phos- trending down  Elevated AST- resolved  Hypoalbuminemia  Hyponatremia- resolved  Hypocalcemia  UTI- on ATBs  S/P left total hip replacement 12/12/22  COPD, not exacerbated   S/P ERCP 12/27/22 with biliary sphincterotomy done, cholangiogram with no stones or sludge    Plan:    IVF  Continue ATBs per primary  NPO  Pain & nausea control per primary  Monitor HFP  Cholecystectomy today per surgery  No NSAIDs, ASA, antiplatelets, or anticoagulants for 48 hours  If patient develops melena/dark stool in the next two weeks, report immediately to the ER and call the GI doctor on call at [facility]  RN updated  Supportive care per primary team  Follow-up with GI as needed  GI signing off    Case reviewed and impression/plan reviewed in collaboration with Dr. Del Valle Else  Electronically signed by STACY Hilton CNP on 12/28/2022 at 9:23 AM    [facility]

## 2022-12-28 NOTE — PROGRESS NOTES
[facility] - Dr. Fernanda Tenorio  Daily Progress Note    Pt Name: Abbe Sapp  Medical Record Number: [id number]  Date of Birth 1950   Today's Date: 12/28/2022    Hospital day # 1     ASSESSMENT   Acute cholecystitis  4 mm calculus within common hepatic duct with sludge within CBD  Sepsis secondary to #1  Hypotension   UTI  Recent total hip replacement 12/12/22  COPD  Hepatic steatosis  Hyponatremia - resolved    has a past medical history of Anxiety, Arthritis, Cancer ([facility] [address] COPD (chronic obstructive pulmonary disease) ([facility] [address] [facility] Female bladder prolapse, and Hypertension. PLAN   NPO  Status post ERCP with Dr. Raj Gowers. Successful biliary sphincterotomy but no stones found. Wound care to pressure ulcer. Allevyn applied. No need for wound/ostomy consult at this time. Continue to monitor. IV fluids. Fluid bolus this morning. Hold BP meds this morning  Informed consent  Antibiotics  Await urine cultures  XIN and SCDs. Lovenox post op. Discussed with ortho. They will see today in-house but no need for formal consult. PT/OT on board. Appreciate assistance. Pain & nausea control  Okay to shower  Labs all much better. WBC 14.4. Procalcitonin 25.83. Lipase normal. Continue to trend. Planning robotic possible open cholecystectomy later this afternoon. Surgical intervention again discussed with patient. Questions answered. She agrees to proceed. SUBJECTIVE   Chief complaint: No new complaints    Patient was stable overnight. VS reviewed. Asymptomatic hypotension. No Fevers. Feels better today. Pain pretty well resolved. Chart reviewed. Updated by nursing staff. Denies chest discomfort or dyspnea. No N/V; (+) belching, flatus. No BM. Tolerating Diet NPO diet. Has not needed any pain medications. Up with assistance. No urinary complaints. Left hip incision clean, dry and intact.    CURRENT MEDICATIONS   Scheduled Meds:   piperacillin-tazobactam  3,375 mg IntraVENous On Call to OR    sodium chloride flush  5-40 mL IntraVENous 2 times per day    piperacillin-tazobactam  3,375 mg IntraVENous Q8H    amLODIPine  2.5 mg Oral Daily     Continuous Infusions:   sodium chloride      sodium chloride 125 mL/hr at 22 0546     PRN Meds:.sodium chloride flush, sodium chloride, ondansetron **OR** ondansetron, HYDROmorphone **OR** HYDROmorphone, ALPRAZolam, acetaminophen, HYDROcodone 5 mg - acetaminophen **OR** HYDROcodone 5 mg - acetaminophen  OBJECTIVE   CURRENT VITALS:  height is 5' 2\" (1.575 m) and weight is 173 lb 11.6 oz (78.8 kg). Her oral temperature is 97.3 °F (36.3 °C). Her blood pressure is 95/75 and her pulse is 79. Her respiration is 16 and oxygen saturation is 97%. Temperature Range (24h):Temp: 97.3 °F (36.3 °C) Temp  Av.9 °F (36.6 °C)  Min: 97.3 °F (36.3 °C)  Max: 98.7 °F (37.1 °C)  BP Range (51V): Systolic (29DTL), TPW:16 , Min:89 , ONM:241     Diastolic (47MAF), BAO:49, Min:55, Max:76    Pulse Range (24h): Pulse  Av.6  Min: 68  Max: 118  Respiration Range (24h): Resp  Av.1  Min: 11  Max: 20  Current Pulse Ox (24h):  SpO2: 97 %  Pulse Ox Range (24h):  SpO2  Av.9 %  Min: 91 %  Max: 100 %  Oxygen Amount and Delivery: O2 Flow Rate (L/min): 2 L/min  Incentive Spirometry Tx:            GENERAL: alert, cooperative, no distress  SKIN: Skin color, texture, turgor normal. No rashes or lesions. HEENT: Head is normocephalic, atraumatic. NECK: Supple, symmetrical, trachea midline  LUNGS: clear to ausculation, without wheezes, rales or rhonci  HEART: normal rate and regular rhythm  ABDOMEN: soft, minimal tenderness to RUQ, non-distended, bowel sounds present in all 4 quadrants, and no guarding or peritoneal signs  NEUROLOGIC: There are no focalizing motor or sensory deficits. CN II-XII are grossly intact. [facility] EXTREMITIES: no cyanosis, no clubbing, and no edema. Left hip incision with steri-strips. Healing well. No numbness or tingling.    In: 2986.6 [I.V.:2409.8]  Out: 450 [Urine:450]  Date 12/28/22 0000 - 12/28/22 2359   Shift 3348-4372 [id number] 6466-7285 24 Hour Total   INTAKE   I.V.(mL/kg/hr) 1699.8   1699.8   IV Piggyback 576.9   576.9   Shift Total(mL/kg) 2276. 6(28.9)   2276. 6(28.9)   OUTPUT   Urine(mL/kg/hr) 450   450   Shift Total(mL/kg) 450(5.7)   450(5.7)   Weight (kg) 78.8 78.8 78.8 78.8     LABS     Recent Labs     12/27/22 0315 12/28/22  0552   WBC 30.2* 14.4*   HGB 12.1 10.3*   HCT 34.7* 31.7*    279   *  --    K 4.2  --    CL 89*  --    CO2 26  --    BUN 18  --    CREATININE 0.8  --    CALCIUM 8.0*  --       No results for input(s): PTT, INR in the last 72 hours. Invalid input(s): PT  Recent Labs     12/27/22 0315 12/27/22  0729 12/27/22  1704 12/28/22  0029   AST 48*  --   --   --    ALT 27  --   --   --    BILITOT 0.5  --   --   --    BILIDIR <0.2  --   --   --    LIPASE 16.7  --   --   --    LACTA  --  1.2 1.0 1.0     No results for input(s): TROPONINT in the last 72 hours. RADIOLOGY   No new imaging    Total time spent in care of patient:  20 minutes collectively between subjective/objective examination, chart review, documentation, clinical reasoning and discussion with attending regarding plan/interval changes. Electronically signed by STACY Peck CNP on 12/28/2022 at 6:42 AM       Patient seen and examined independently by me 12/28/2022     I personally supervised the PA/NP in the evaluation, management and development of the treatment plan for [facility] MORAL[facility] iBanchi  on the same date of service as above. I personally interviewed Alcides Amaya   and  discussed his review of symptoms as able due to the patient's condition, as well as performed an individual physical exam on the same   date of service as above. In addition I discussed the patient's condition and treatment options with the patient, if able, and/or designated family if available.       I have also reviewed and agree with the past medical,  family and social history updates as well as care plans unless otherwise noted below. All questions were answered. I examined independently and reviewed relevant data myself and may have done so in the context of team rounds. A full chart review was performed by me. I attest that this medical record entry accurately reflects signatures and notations that I made in my capacity as an M. D. when I treated and diagnosed Ruben Smith on the date of service above     I was responsible for all medical decision making involving this encounter. I identified and/or confirmed all problems associated with this patient encounter by my own direct physical examination of this patient and review of all radiology studies and labwork  that were ordered and available. Active Hospital Problems    Diagnosis     Acute cholecystitis [K81.0]      Priority: Medium    Right upper quadrant abdominal pain [R10.11]      Priority: Medium        I  discussed the management of all of the identified problems with the APN or PA. I formulated the treatment plan for all identified problems and discussed those with the APN or PA . This management plan was then carried out and the patient's orders for care by the APN or PA. Total time personally spent on this patient encounter was 25 minutes which includes :  Preparing to see the patient( reviewing tests and chart)  Obtaining and reviewing separately obtained history  Performing a medically appropriate examination and evaluation  Ordering medications, tests, or procedures  Counseling and educating the patient/family/caregiver  Care coordination  Referring and communicating with other healthcare professionals  Documenting clinical information in the EHR  Independent interpretation of results and communicating the results to patient and care team  This includes a direct physical exam as well as all the other encounter activities described above. Time may be discontiguous. Time does not include procedures. Please see our orders that were directed and approved by me if there are any new ones for the updated patient care plan. Above discussed and I agree with documentation and orders placed by Barbi Hickman    See any additional comments if needed below for any other updated orders and plans. -- Status post ERCP.  NPO. Consent. IV fluid hydration. Pain and nausea control. SCDs for DVT prophylaxis. IV antibiotics. Risks, benefits and alternatives discussed in detail in regards to cholecystectomy. All questions answered. She agrees and wishes to proceed with surgery. Plan on surgical intervention this afternoon.     Electronically signed by Sabrina Hardin MD on 12/28/22 at 12:54 PM EST

## 2022-12-28 NOTE — CARE COORDINATION
Case Management Assessment  Initial Evaluation    Date/Time of Evaluation: 12/28/2022 11:47 AM  Assessment Completed by: Ilsa Musa RN    If patient is discharged prior to next notation, then this note serves as note for discharge by case management. Patient Name: Dunia Carr                   YOB: 1950  Diagnosis: Acute cholecystitis [K81.0]  Acute acalculous cholecystitis [K81.0]  Right upper quadrant abdominal pain [R10.11]  Leukocytosis, unspecified type [D72.829]  Sepsis, due to unspecified organism, unspecified whether acute organ dysfunction present [facility]) [A41.9]                   Date / Time: 12/27/2022  2:41 AM  [facility]: [facility]     Patient Admission Status: Inpatient   Readmission Risk (Low < 19, Mod (19-27), High > 27): Readmission Risk Score: 14.6    Current PCP: Adalgisa Payne MD  PCP verified by CM? Yes    Chart Reviewed: Yes      History Provided by: Patient  Patient Orientation: Alert and Oriented    Patient Cognition: Alert    Hospitalization in the last 30 days (Readmission):  No    If yes, Readmission Assessment in CM Navigator will be completed. Advance Directives:      Code Status: Full Code   Patient's Primary Decision Maker is: Patient Declined (Legal Next of Kin Remains as Decision Maker)      Discharge Planning:    Patient lives with: Alone (daughter lives behind her) Type of Home: Trailer/Mobile Home  Primary Care Giver: Self  Patient Support Systems include: Children, Home Care Staff   Current Financial resources: Medicare  Current community resources: ECF/Home Care  Current services prior to admission: Durable Medical Equipment, Home Care            Current DME: Walker            Type of Home Care services:  PT, OT, Nursing Services    ADLS  Prior functional level: Assistance with the following:, Mobility  Current functional level: Assistance with the following:, Mobility    Family can provide assistance at DC:  Yes  Would you like Case Management to discuss the discharge plan with any other family members/significant others, and if so, who? No  Plans to Return to Present Housing: Yes  Other Identified Issues/Barriers to RETURNING to current housing: none  Potential Assistance needed at discharge: 1 In[address] Bajwa (additional services added)            Potential DME:    Patient expects to discharge to: Trailer/mobile home  Plan for transportation at discharge: Family    Financial    Payor: MEDICARE / Plan: MEDICARE PART A AND B / Product Type: *No Product type* /     Does insurance require precert for SNF: No    Potential assistance Purchasing Medications: No  Meds-to-Beds request: Yes      [facility] - [address], [place] - [address]  [address]  [address]  Phone: [phone] Fax: [phone] [id number] - [address], [place] - [address] [facility] [phone] - F [phone]  [address] [address]  Phone: [phone] Fax: [phone]      Notes:    Factors facilitating achievement of predicted outcomes: Family support, Cooperative, Pleasant, and Good insight into deficits    Barriers to discharge: none identified    Additional Case Management Notes: Ca+ 7.3, PCT 35.83, WBC 14.4, +UA. 97% on 2L O2. Planning [place] today with Dr. Neelam Hernandez. IVF, pain control, IV zosyn. PT/OT. Procedure: 12/27 ERCP    The Plan for Transition of Care is related to the following treatment goals of Acute cholecystitis [K81.0]  Acute acalculous cholecystitis [K81.0]  Right upper quadrant abdominal pain [R10.11]  Leukocytosis, unspecified type [D72.829]  Sepsis, due to unspecified organism, unspecified whether acute organ dysfunction present [facility]) [A41.9]    Patient Goals/Plan/Treatment Preferences: Met with Emiliana Valentine, she plans to return home alone at discharge. She is current with [facility], as she had recent hip surgery. SW consulted.    Transportation/Food Security/Housekeeping Addressed: No issues identified.      Amina Lawson RN  Case Management Department

## 2022-12-28 NOTE — DISCHARGE INSTRUCTIONS
[address]    Pt Name: [facility] Record Number: [id number]  Today's Date: 12/30/2022    GENERAL ANESTHESIA OR SEDATION  1. Do not drive or operate hazardous machinery for 24 hours. 2. Do not make important business or personal decisions for 24 hours. 3. Do not drink alcoholic beverages or use tobacco for 24 hours. ACTIVITY INSTRUCTIONS:  [] Rest today. Resume light to normal activity tomorrow.   [] You may resume normal activity tomorrow. Do not engage in strenuous activity that may place stress on your incision. [x] Do not drive for 3-5 days or while taking the pain medication. Avoid heavy lifting, tugging, pullings greater than 10 lbs until seen in the office. DIET INSTRUCTIONS:  [x]Regular diet as tolerated. MEDICATIONS  [x]Prescription sent with you to be used as directed. []Lortab   [x]Norco   []Percocet   []Tylenol #3   []Oxycontin  Do not drink alcohol or drive while taking these medications. You may experience dizziness or drowsiness with these medications. You may also experience constipation which can be relieved with stool softners or laxatives. **Pain medication at discharge - use only as prescribed- refills may be available to you at your follow up appointments if needed and warranted. Narcotics should be used for only short term and we highly encouraged our patients to wean off appropriately and use other means for pain such as non pharmacologic measures and over the counter tylenol or ibuprofen if no restrictions apply. We do  know that surgical pain is real and will not hesitate to help eliminate some of your discomfort. However we will not be able to completely make you pain free and it is important to determine what pain level is tolerable for you **  [x]You may resume your daily prescription medication schedule unless otherwise specified. HOLD NORVASC IF SYSTOLIC BP < 738.   [x]Resume 325mg Aspirin      Take the Zofran exactly as prescribed to control nausea and vomiting. Use Colace and MiraLAX asneeded to prevent constipation. Do not allow yourself to become constipated. Drink at least 64 ounces of liquids per day. WOUND/DRESSING INSTRUCTIONS:  Always ensure you and your care giver clean hands before and after caring for the wound. [] Keep dressing clean and dry for 48 hours. Change when soiled or wet. [x] Allow steri-strips to fall off on their own. [x] Ice operative site for 20 minutes 4 times a day as needed. [x] May wash over incision in shower, but do not soak in a bath.  [] Take sitz bath for 20 minutes twice daily and after bowel movements. [x] Keep the abdominal binder in place during the day. May remove to shower and at night. ABDOMINAL/LAPAROSCOPIC SURGERY  [x]You are encouraged to get up and move around as this helps with circulation, prevents blood clots from forming and speeds up the healing process. Call the office if you develop pain or swelling in your legs. Do not massage sore muscles in the legs. [x]Breath deeply and cough from time to time. This helps to clear your lungs and helps prevent pneumonia. [x]Supporting your incision with a pillow or your hand helps to minimize discomfort and pain. [x]Laparoscopic patients may develop shoulder pain in the first 48 hours from the gas used during the procedure a heating pad may help alleviate this discomfort. FOLLOW-UP CARE. SPECIFICALLY WATCH FOR:   Fever over 101 degrees by mouth   Increased redness, warmth, hardness at operative site. Blood soaked dressing (small amounts of oozing may be normal.)   Increased or progressive drainage from the surgical area   Inability to urinate or blood in the urine   Pain not relieved by the medications ordered   Persistent nausea and/or vomiting, unable to retain fluids. Pain or swelling in your legs. Shortness of breath. Call the office if you develop any of the above symptoms.      FOLLOW-UP APPOINTMENT   []1 week   [x]2 weeks:    []Other    Call my office if you have any problem that concerns you 24 [phone]. After hours, you can reach the answering service via the office phone number. IF YOU NEED IMMEDIATE ATTENTION, GO TO THE EMERGENCY ROOM AND YOUR DOCTOR WILL BE CONTACTED. Prepared by Jojo Hutchinson CNP for   Felipe Church MD FACS  [address]     If you develop black/dark stool in the next two weeks, report immediately to the ER and call the GI doctor on call at [facility]

## 2022-12-29 LAB
ALBUMIN SERPL-MCNC: 2.4 G/DL (ref 3.5–5.1)
ALP BLD-CCNC: 122 U/L (ref 38–126)
ALT SERPL-CCNC: 14 U/L (ref 11–66)
ANION GAP SERPL CALCULATED.3IONS-SCNC: 14 MEQ/L (ref 8–16)
AST SERPL-CCNC: 18 U/L (ref 5–40)
BILIRUB SERPL-MCNC: 0.3 MG/DL (ref 0.3–1.2)
BILIRUBIN DIRECT: < 0.2 MG/DL (ref 0–0.3)
BUN BLDV-MCNC: 14 MG/DL (ref 7–22)
CALCIUM SERPL-MCNC: 7.1 MG/DL (ref 8.5–10.5)
CHLORIDE BLD-SCNC: 107 MEQ/L (ref 98–111)
CO2: 18 MEQ/L (ref 23–33)
CREAT SERPL-MCNC: 0.4 MG/DL (ref 0.4–1.2)
ERYTHROCYTE [DISTWIDTH] IN BLOOD BY AUTOMATED COUNT: 15.6 % (ref 11.5–14.5)
ERYTHROCYTE [DISTWIDTH] IN BLOOD BY AUTOMATED COUNT: 48.8 FL (ref 35–45)
GFR SERPL CREATININE-BSD FRML MDRD: > 60 ML/MIN/1.73M2
GLUCOSE BLD-MCNC: 138 MG/DL (ref 70–108)
HCT VFR BLD CALC: 29.5 % (ref 37–47)
HCT VFR BLD CALC: 30.3 % (ref 37–47)
HEMOGLOBIN: 10.1 GM/DL (ref 12–16)
HEMOGLOBIN: 10.2 GM/DL (ref 12–16)
MCH RBC QN AUTO: 29.5 PG (ref 26–33)
MCHC RBC AUTO-ENTMCNC: 34.2 GM/DL (ref 32.2–35.5)
MCV RBC AUTO: 86.3 FL (ref 81–99)
PLATELET # BLD: 329 THOU/MM3 (ref 130–400)
PMV BLD AUTO: 9.2 FL (ref 9.4–12.4)
POTASSIUM REFLEX MAGNESIUM: 4.1 MEQ/L (ref 3.5–5.2)
RBC # BLD: 3.42 MILL/MM3 (ref 4.2–5.4)
SODIUM BLD-SCNC: 139 MEQ/L (ref 135–145)
TOTAL PROTEIN: 4.3 G/DL (ref 6.1–8)
WBC # BLD: 13.1 THOU/MM3 (ref 4.8–10.8)

## 2022-12-29 PROCEDURE — 6360000002 HC RX W HCPCS: Performed by: SURGERY

## 2022-12-29 PROCEDURE — 6360000002 HC RX W HCPCS: Performed by: STUDENT IN AN ORGANIZED HEALTH CARE EDUCATION/TRAINING PROGRAM

## 2022-12-29 PROCEDURE — 85018 HEMOGLOBIN: CPT

## 2022-12-29 PROCEDURE — 80076 HEPATIC FUNCTION PANEL: CPT

## 2022-12-29 PROCEDURE — 80048 BASIC METABOLIC PNL TOTAL CA: CPT

## 2022-12-29 PROCEDURE — 97535 SELF CARE MNGMENT TRAINING: CPT

## 2022-12-29 PROCEDURE — 97530 THERAPEUTIC ACTIVITIES: CPT

## 2022-12-29 PROCEDURE — 1200000000 HC SEMI PRIVATE

## 2022-12-29 PROCEDURE — 2580000003 HC RX 258: Performed by: STUDENT IN AN ORGANIZED HEALTH CARE EDUCATION/TRAINING PROGRAM

## 2022-12-29 PROCEDURE — 6370000000 HC RX 637 (ALT 250 FOR IP): Performed by: SURGERY

## 2022-12-29 PROCEDURE — 97110 THERAPEUTIC EXERCISES: CPT

## 2022-12-29 PROCEDURE — 85027 COMPLETE CBC AUTOMATED: CPT

## 2022-12-29 PROCEDURE — 85014 HEMATOCRIT: CPT

## 2022-12-29 PROCEDURE — 2580000003 HC RX 258: Performed by: SURGERY

## 2022-12-29 PROCEDURE — 97162 PT EVAL MOD COMPLEX 30 MIN: CPT

## 2022-12-29 PROCEDURE — 36415 COLL VENOUS BLD VENIPUNCTURE: CPT

## 2022-12-29 PROCEDURE — 97116 GAIT TRAINING THERAPY: CPT

## 2022-12-29 RX ADMIN — HYDROCODONE BITARTRATE AND ACETAMINOPHEN 1 TABLET: 5; 325 TABLET ORAL at 00:22

## 2022-12-29 RX ADMIN — HYDROCODONE BITARTRATE AND ACETAMINOPHEN 2 TABLET: 5; 325 TABLET ORAL at 14:55

## 2022-12-29 RX ADMIN — PIPERACILLIN AND TAZOBACTAM 3375 MG: 3; .375 INJECTION, POWDER, FOR SOLUTION INTRAVENOUS at 04:00

## 2022-12-29 RX ADMIN — HYDROCODONE BITARTRATE AND ACETAMINOPHEN 2 TABLET: 5; 325 TABLET ORAL at 10:07

## 2022-12-29 RX ADMIN — HYDROCODONE BITARTRATE AND ACETAMINOPHEN 1 TABLET: 5; 325 TABLET ORAL at 20:38

## 2022-12-29 RX ADMIN — HYDROCODONE BITARTRATE AND ACETAMINOPHEN 2 TABLET: 5; 325 TABLET ORAL at 05:35

## 2022-12-29 RX ADMIN — ENOXAPARIN SODIUM 40 MG: 100 INJECTION SUBCUTANEOUS at 08:34

## 2022-12-29 RX ADMIN — CEFTRIAXONE 2000 MG: 2 INJECTION, POWDER, FOR SOLUTION INTRAMUSCULAR; INTRAVENOUS at 12:18

## 2022-12-29 RX ADMIN — SODIUM CHLORIDE: 9 INJECTION, SOLUTION INTRAVENOUS at 01:52

## 2022-12-29 ASSESSMENT — PAIN DESCRIPTION - ORIENTATION
ORIENTATION: RIGHT
ORIENTATION: MID
ORIENTATION: RIGHT

## 2022-12-29 ASSESSMENT — PAIN - FUNCTIONAL ASSESSMENT
PAIN_FUNCTIONAL_ASSESSMENT: ACTIVITIES ARE NOT PREVENTED

## 2022-12-29 ASSESSMENT — PAIN DESCRIPTION - DESCRIPTORS
DESCRIPTORS: ACHING
DESCRIPTORS: SHARP
DESCRIPTORS: DISCOMFORT
DESCRIPTORS: SHARP
DESCRIPTORS: ACHING
DESCRIPTORS: SHARP
DESCRIPTORS: ACHING

## 2022-12-29 ASSESSMENT — PAIN DESCRIPTION - FREQUENCY
FREQUENCY: INTERMITTENT
FREQUENCY: CONTINUOUS
FREQUENCY: INTERMITTENT

## 2022-12-29 ASSESSMENT — PAIN DESCRIPTION - LOCATION
LOCATION: ABDOMEN

## 2022-12-29 ASSESSMENT — PAIN SCALES - GENERAL
PAINLEVEL_OUTOF10: 6
PAINLEVEL_OUTOF10: 6
PAINLEVEL_OUTOF10: 3
PAINLEVEL_OUTOF10: 2
PAINLEVEL_OUTOF10: 1
PAINLEVEL_OUTOF10: 5
PAINLEVEL_OUTOF10: 6
PAINLEVEL_OUTOF10: 7
PAINLEVEL_OUTOF10: 7
PAINLEVEL_OUTOF10: 0

## 2022-12-29 ASSESSMENT — PAIN DESCRIPTION - ONSET
ONSET: ON-GOING

## 2022-12-29 ASSESSMENT — PAIN DESCRIPTION - PAIN TYPE
TYPE: SURGICAL PAIN

## 2022-12-29 NOTE — PROGRESS NOTES
[address] [address]  INPATIENT PHYSICAL THERAPY  EVALUATION  [facility] RENAL TELEMETRY 6K - 6K-10/010-A    Time In: 0745  Time Out: 6316  Timed Code Treatment Minutes: 23 Minutes  Minutes: 31          Date: 2022  Patient Name: Dunia Carr,  Gender:  female        MRN: [id number]  : 1950  (67 y.o.)      Referring Practitioner: Rodriguez Reece MD  Diagnosis: Acute cholecystitis  Additional Pertinent Hx: 70-year old female patient who presents to the ER for abdominal pain and diarrhea. Patient recently had a left hip replacement on  at [facility]. States Saturday she ate some soup and started with periumbilical pain and nausea//vomiting that evening. Patient also had constipation so she took MOM which then gave her diarrhea. Patient states after Saturday night pain migrated to epigastric/RUQ. Pain is sharp and constant. No aggravating or alleviating factors. Some nausea but no further vomiting since Saturday. Still having diarrhea. Last BM yesterday. ERCP  Robotic cholecystectomy      Restrictions/Precautions:  Restrictions/Precautions: Fall Risk, General Precautions  Position Activity Restriction  Other position/activity restrictions: s/p L THR (anterior approach-no precautions; Dr. Ana Edgar at [address]) 2022    Subjective:  Chart Reviewed: Yes  Patient assessed for rehabilitation services?: Yes  Subjective: RN approved session.  Pt pleasant and agreeable to therapy    General:  Overall Orientation Status: Within Functional Limits  Vision: Within Functional Limits  Hearing: Within functional limits       Pain: 5/10: stomach     Vitals: Vitals not assessed per clinical judgement, see nursing flowsheet    Social/Functional History:    Lives With: Alone  Type of Home: Trailer  Home Layout: One level  Home Access: Stairs to enter with rails, Ramped entrance  Entrance Stairs - Number of Steps: 4  Home Equipment: Reacher, [address] aid, Long-handled shoehorn, Walker, rolling     Bathroom Shower/Tub: Walk-in shower  Bathroom Toilet: Standard (has BSC over toilet)  Bathroom Equipment: Grab bars in shower, Shower chair       ADL Assistance: [address]: Needs assistance (Family A with groceries & has )  Ambulation Assistance: Independent  Transfer Assistance: Independent    Active : Yes     Additional Comments: Pt reports using RW PLOF since L THR sx aprox 2 weeks ago. OBJECTIVE:  Range of Motion:  Bilateral Lower Extremity: WFL; LLE slightly more limited d/t recent surgery     Strength:  Bilateral Lower Extremity: Deconditioned slightly L more than R d/t recent surgery     Balance:  Static Sitting Balance:  Supervision  Static Standing Balance: Stand By Assistance    Bed Mobility:  Supine to Sit: Moderate Assistance, X 1, with head of bed raised, with rail    Transfers:  Sit to Stand: Stand By Assistance  Stand to Sit:Stand By Assistance    Ambulation:  Stand By Assistance  Distance: 200'  Surface: Level Tile  Device:Rolling Walker  Gait Deviations: Forward Flexed Posture and Decreased Weight Shift Left    Exercise:  Patient was guided in 1 set(s) 15 reps of exercise to both lower extremities. Ankle pumps, Glut sets, Quad sets, Heelslides, and Straight leg raises. Exercises were completed for increased independence with functional mobility. Functional Outcome Measures: Completed  -PAC Inpatient Mobility Raw Score : 18  AM-PAC Inpatient T-Scale Score : 43.63    ASSESSMENT:  Activity Tolerance:  Patient tolerance of  treatment: good. Treatment Initiated: Treatment and education initiated within context of evaluation. Evaluation time included review of current medical information, gathering information related to past medical, social and functional history, completion of standardized testing, formal and informal observation of tasks, assessment of data and development of plan of care and goals.   Treatment time included skilled education and facilitation of tasks to increase safety and independence with functional mobility for improved independence and quality of life. Assessment: Body Structures, Functions, Activity Limitations Requiring Skilled Therapeutic Intervention: Decreased functional mobility , Decreased strength, Decreased posture, Decreased endurance, Decreased balance  Assessment: Rosalba Rodgers is a 67 y.o. female that presents with abdominal pain. Recent L LELO. she is ind prior to admission now requiring assist for basic mobility. Pt demonstrates a decrease in baseline by way of bed mobility, transfers and ambulation secondary to decreased activity tolerance, strength, fatigue, and balance deficits. Pt will benefit from skilled PT services throughout admission and beyond hospital discharge for improvements in functional mobility and in order to decrease fall risk and return pt to PLOF. Therapy Prognosis: Good    Requires PT Follow-Up: Yes    Discharge Recommendations:  Discharge Recommendations: Home with Home health PT    Patient Education:      .     Patient Education  Education Given To: Patient  Education Provided: Role of Therapy, Plan of Care  Education Method: Verbal  Barriers to Learning: None  Education Outcome: Verbalized understanding       Equipment Recommendations:  Equipment Needed: No    Plan:  Current Treatment Recommendations: Strengthening, Balance training, Endurance training, Transfer training, Functional mobility training, Gait training, Stair training, Safety education & training, Therapeutic activities  General Plan:  (3-5x GM)    Goals:  Patient Goals : get up and get around  Short Term Goals  Time Frame for Short Term Goals: by discharge  Short Term Goal 1: bed mobility with  HOB flat, no rails mod I for increased functional ind  Short Term Goal 2: sit<>Stand from various surfaces with LRD mod I for safe transfers  Short Term Goal 3: ambulate 200' with LRD mod I for safe household distances  Short Term Goal 4: navigate 4 steps or ramp with LRD mod I for safe enter/exit of home  Long Term Goals  Time Frame for Long Term Goals : NA d/t short ELOS    Following session, patient left in safe position with all fall risk precautions in place.      [facility] ([facility]) PT, DPT

## 2022-12-29 NOTE — PLAN OF CARE
Problem: Discharge Planning  Goal: Discharge to home or other facility with appropriate resources  12/29/2022 0911 by Dexter Martinez RN  Outcome: Progressing  12/28/2022 2224 by Regla Navarrete RN  Outcome: Progressing  Flowsheets (Taken 12/28/2022 2224)  Discharge to home or other facility with appropriate resources:   Identify barriers to discharge with patient and caregiver   Arrange for needed discharge resources and transportation as appropriate   Identify discharge learning needs (meds, wound care, etc)   Arrange for interpreters to assist at discharge as needed     Problem: Discharge Planning  Goal: Discharge to home or other facility with appropriate resources  12/29/2022 0911 by Dexter Martinez RN  Outcome: Progressing  12/28/2022 2224 by Regla Navarrete RN  Outcome: Progressing  Flowsheets (Taken 12/28/2022 2224)  Discharge to home or other facility with appropriate resources:   Identify barriers to discharge with patient and caregiver   Arrange for needed discharge resources and transportation as appropriate   Identify discharge learning needs (meds, wound care, etc)   Arrange for interpreters to assist at discharge as needed     Problem: Pain  Goal: Verbalizes/displays adequate comfort level or baseline comfort level  12/29/2022 0911 by Dexter Martinez RN  Outcome: Progressing  12/28/2022 2224 by Regla Navarrete RN  Outcome: Not Progressing  Flowsheets (Taken 12/28/2022 2224)  Verbalizes/displays adequate comfort level or baseline comfort level:   Encourage patient to monitor pain and request assistance   Assess pain using appropriate pain scale   Administer analgesics based on type and severity of pain and evaluate response   Implement non-pharmacological measures as appropriate and evaluate response   Consider cultural and social influences on pain and pain management     Problem: Skin/Tissue Integrity  Goal: Absence of new skin breakdown  Description: 1.   Monitor for areas of redness and/or skin breakdown  2. Assess vascular access sites hourly  3. Every 4-6 hours minimum:  Change oxygen saturation probe site  4. Every 4-6 hours:  If on nasal continuous positive airway pressure, respiratory therapy assess nares and determine need for appliance change or resting period. 12/29/2022 0911 by Felipe Taylor RN  Outcome: Progressing  12/28/2022 2224 by Mainor Cook RN  Outcome: Not Progressing  Note: All dressing are clean, dry and intact. No new skin issues during the shift. Problem: Skin/Tissue Integrity  Goal: Absence of new skin breakdown  Description: 1. Monitor for areas of redness and/or skin breakdown  2. Assess vascular access sites hourly  3. Every 4-6 hours minimum:  Change oxygen saturation probe site  4. Every 4-6 hours:  If on nasal continuous positive airway pressure, respiratory therapy assess nares and determine need for appliance change or resting period. 12/29/2022 0911 by Felipe Taylor RN  Outcome: Progressing  12/28/2022 2224 by Mainor Cook RN  Outcome: Not Progressing  Note: All dressing are clean, dry and intact. No new skin issues during the shift. Problem: Pain  Goal: Verbalizes/displays adequate comfort level or baseline comfort level  12/29/2022 0911 by Felipe Taylor RN  Outcome: Progressing  12/28/2022 2224 by Mainor Cook RN  Outcome: Not Progressing  Flowsheets (Taken 12/28/2022 2224)  Verbalizes/displays adequate comfort level or baseline comfort level:   Encourage patient to monitor pain and request assistance   Assess pain using appropriate pain scale   Administer analgesics based on type and severity of pain and evaluate response   Implement non-pharmacological measures as appropriate and evaluate response   Consider cultural and social influences on pain and pain management     Problem: Skin/Tissue Integrity  Goal: Absence of new skin breakdown  Description: 1. Monitor for areas of redness and/or skin breakdown  2. Assess vascular access sites hourly  3. Every 4-6 hours minimum:  Change oxygen saturation probe site  4. Every 4-6 hours:  If on nasal continuous positive airway pressure, respiratory therapy assess nares and determine need for appliance change or resting period. 12/29/2022 0911 by Zelda Michaud RN  Outcome: Progressing  12/28/2022 2224 by Jones Mata RN  Outcome: Not Progressing  Note: All dressing are clean, dry and intact. No new skin issues during the shift.      Problem: Gastrointestinal - Adult  Goal: Minimal or absence of nausea and vomiting  12/29/2022 0911 by Zelda Michaud RN  Outcome: Progressing  12/28/2022 2224 by Jones Mata RN  Outcome: Not Progressing  Flowsheets (Taken 12/28/2022 2224)  Minimal or absence of nausea and vomiting:   Administer IV fluids as ordered to ensure adequate hydration   Administer ordered antiemetic medications as needed   Advance diet as tolerated, if ordered   Provide nonpharmacologic comfort measures as appropriate

## 2022-12-29 NOTE — PLAN OF CARE
Problem: Pain  Goal: Verbalizes/displays adequate comfort level or baseline comfort level  12/28/2022 2224 by Edyta Ricks RN  Outcome: Not Progressing  Flowsheets (Taken 12/28/2022 2224)  Verbalizes/displays adequate comfort level or baseline comfort level:   Encourage patient to monitor pain and request assistance   Assess pain using appropriate pain scale   Administer analgesics based on type and severity of pain and evaluate response   Implement non-pharmacological measures as appropriate and evaluate response   Consider cultural and social influences on pain and pain management     Problem: Skin/Tissue Integrity  Goal: Absence of new skin breakdown  Description: 1. Monitor for areas of redness and/or skin breakdown  2. Assess vascular access sites hourly  3. Every 4-6 hours minimum:  Change oxygen saturation probe site  4. Every 4-6 hours:  If on nasal continuous positive airway pressure, respiratory therapy assess nares and determine need for appliance change or resting period. 12/28/2022 2224 by Edyta Ricks RN  Outcome: Not Progressing  Note: All dressing are clean, dry and intact. No new skin issues during the shift.      Problem: Gastrointestinal - Adult  Goal: Minimal or absence of nausea and vomiting  12/28/2022 2224 by Edyta Ricks RN  Outcome: Not Progressing  Flowsheets (Taken 12/28/2022 2224)  Minimal or absence of nausea and vomiting:   Administer IV fluids as ordered to ensure adequate hydration   Administer ordered antiemetic medications as needed   Advance diet as tolerated, if ordered   Provide nonpharmacologic comfort measures as appropriate     Problem: Discharge Planning  Goal: Discharge to home or other facility with appropriate resources  12/28/2022 2224 by Edyta Ricks RN  Outcome: Progressing  Flowsheets (Taken 12/28/2022 2224)  Discharge to home or other facility with appropriate resources:   Identify barriers to discharge with patient and caregiver   Arrange for needed discharge resources and transportation as appropriate   Identify discharge learning needs (meds, wound care, etc)   Arrange for interpreters to assist at discharge as needed     Problem: Safety - Adult  Goal: Free from fall injury  12/28/2022 2224 by Fabrice Waldron RN  Outcome: Progressing  Flowsheets (Taken 12/28/2022 2224)  Free From Fall Injury:   Instruct family/caregiver on patient safety   Based on caregiver fall risk screen, instruct family/caregiver to ask for assistance with transferring infant if caregiver noted to have fall risk factors     Problem: ABCDS Injury Assessment  Goal: Absence of physical injury  12/28/2022 2224 by Fabrice Waldron RN  Outcome: Progressing  Flowsheets (Taken 12/28/2022 2224)  Absence of Physical Injury: Implement safety measures based on patient assessment     Problem: Gastrointestinal - Adult  Goal: Maintains adequate nutritional intake  12/28/2022 2224 by Fabrice Waldron RN  Outcome: Progressing  Flowsheets (Taken 12/28/2022 2224)  Maintains adequate nutritional intake: Monitor intake and output, weight and lab values     Problem: Pain  Goal: Verbalizes/displays adequate comfort level or baseline comfort level  12/28/2022 2224 by Fabrice Waldron RN  Outcome: Not Progressing  Flowsheets (Taken 12/28/2022 2224)  Verbalizes/displays adequate comfort level or baseline comfort level:   Encourage patient to monitor pain and request assistance   Assess pain using appropriate pain scale   Administer analgesics based on type and severity of pain and evaluate response   Implement non-pharmacological measures as appropriate and evaluate response   Consider cultural and social influences on pain and pain management  12/28/2022 1114 by Daryl Vidal RN  Outcome: Progressing  Flowsheets (Taken 12/28/2022 1114)  Verbalizes/displays adequate comfort level or baseline comfort level:   Encourage patient to monitor pain and request assistance   Assess pain using appropriate pain scale   Administer analgesics based on type and severity of pain and evaluate response     Problem: Skin/Tissue Integrity  Goal: Absence of new skin breakdown  Description: 1. Monitor for areas of redness and/or skin breakdown  2. Assess vascular access sites hourly  3. Every 4-6 hours minimum:  Change oxygen saturation probe site  4. Every 4-6 hours:  If on nasal continuous positive airway pressure, respiratory therapy assess nares and determine need for appliance change or resting period. 12/28/2022 2224 by Laveta Soulier, RN  Outcome: Not Progressing  Note: All dressing are clean, dry and intact. No new skin issues during the shift. 12/28/2022 1114 by Nanci Cowden, RN  Outcome: Progressing  Note: Zinc cream/mepilex applied to buttocks wound. Waffle cushion placed on chair. Encouraged pt to switch positions as much as possible     Problem: Gastrointestinal - Adult  Goal: Minimal or absence of nausea and vomiting  12/28/2022 2224 by Laveta Soulier, RN  Outcome: Not Progressing  Flowsheets (Taken 12/28/2022 2224)  Minimal or absence of nausea and vomiting:   Administer IV fluids as ordered to ensure adequate hydration   Administer ordered antiemetic medications as needed   Advance diet as tolerated, if ordered   Provide nonpharmacologic comfort measures as appropriate  12/28/2022 1114 by Nanci Cowden, RN  Outcome: Progressing  Flowsheets (Taken 12/28/2022 1114)  Minimal or absence of nausea and vomiting: Administer IV fluids as ordered to ensure adequate hydration      Care plan reviewed with patient. Patient verbalize understanding of the plan of care and contribute to goal setting.

## 2022-12-29 NOTE — PROGRESS NOTES
[address] RENAL TELEMETRY 6K  Occupational Therapy  Daily Note  Time:   Time In: 845  Time Out:   Timed Code Treatment Minutes: 45 Minutes  Minutes: 38          Date: 2022  Patient Name: Criss Hernandez,   Gender: female      Room: [facility]10/010-A  MRN: [id number]  : 1950  (67 y.o.)  Referring Practitioner: STACY Montana CNP  Diagnosis: acute cholecystitis  Additional Pertinent Hx: Per ER note on 2022:72 y.o. female who presents to the emergency department from home for abdominal pain, vomiting and shortness of breath. The patient had a left hip replacement on the . She has been on pain medications. Started having constipation on Saturday. Took two doses of MOM. Had a lot of loose stool after that which helped the pain. Last night pain returned and has not let up. It has gotten much worse. Pain is largely in the RUQ and epigastric area. Had vomiting on Saturday. Reports some fevers. Restrictions/Precautions:  Restrictions/Precautions: Fall Risk, General Precautions  Position Activity Restriction  Other position/activity restrictions: s/p L THR (anterior approach-no precautions; Dr. Kelsey Corbett at [address]) 2022     SUBJECTIVE: Nurse Kevin Alicea ok'd session, Up in chair upon arrival, agreeable to OT session, cooperative and pleasant    PAIN: 5/10: L LE    Vitals: Vitals not assessed per clinical judgement, see nursing flowsheet    COGNITION: WNL    ADL:   Feeding: Independent. Able to open containers  Lower Extremity Dressing: Supervision. Used reacher and sock aid to obdulio/doff B slipper socks, patient is familiar with Kavya Rodriguez due to have recent back surgery . BALANCE:  Standing Balance: Supervision. Greater than 6 minutes with 1-2 UE support    BED MOBILITY:  Not Tested    TRANSFERS:  Sit to Stand:  Contact Guard Assistance. Bedside chair  Stand to Sit: [employer].       FUNCTIONAL MOBILITY:  Assistive Device: Rolling Walker  Assist Level:  Contact Guard Assistance. Distance:  to/from door    ASSESSMENT:     Activity Tolerance:  Patient tolerance of  treatment: good. Discharge Recommendations: Continue to assess pending progress  Equipment Recommendations: Equipment Needed: No  Other: Pt has needed AE/AD at home  Plan: Times Per Week: 5x  Current Treatment Recommendations: Functional mobility training, Balance training, Self-Care / ADL, Safety education & training    Patient Education  Patient Education: ADL's and Equipment Education    Goals  Short Term Goals  Time Frame for Short Term Goals: until discharge  Short Term Goal 1: Pt will complete LE dressing with S & 0 vcs for safety using LH AE  Short Term Goal 2: Pt will tolerate standing 3-4 min with S for increased ease of sinkside grooming  Short Term Goal 3: Pt will complete mobility to/from bathroom + with ADL item retrieval with RW, S, & 0-2 vcs for safety  Long Term Goals  Time Frame for Long Term Goals : No LTG set d/t short ELOS    Following session, patient left in safe position with all fall risk precautions in place.

## 2022-12-29 NOTE — PROGRESS NOTES
Henri Hernández - Dr. Pooja Olivares  Daily Progress Note    Pt Name: Ivory Lyons  Medical Record Number: [id number]  Date of Birth 1950   Today's Date: 12/29/2022    Hospital day # 2     ASSESSMENT   Acute cholecystitis - POD # 1 Status post robotic cholecystectomy  Bacteremia (+) Klebsiella  4 mm calculus within common hepatic duct with sludge within CBD (MRI finding) - Status post ERCP successful biliary sphincterotomy but no stones found  Sepsis secondary to #1 - improving  Hypotension - stable    UTI  Recent total hip replacement 12/12/22  COPD  Hepatic steatosis  Hyponatremia - resolved    has a past medical history of Anxiety, Arthritis, Cancer ([facility] [address] COPD (chronic obstructive pulmonary disease) ([facility] [address] Endometrial cancer ([facility] [address] Female bladder prolapse, and Hypertension. PLAN   Okay to increase diet as tolerated  GI following  Wound care to pressure ulcer. Allevyn applied. No need for wound/ostomy consult at this time. Continue to monitor. Decrease IV fluids as tolerated  Blood pressure stable. Asymptomatic. Antibiotics. Blood cultures +. ID consult in process. Await urine cultures  XIN and SCDs. Lovenox post op. Discussed with ortho. Saw today as courtesy. Appreciate assistance. Pain & nausea control  Labs reviewed. WBC better 13.1. Hemoglobin stable. Hopefully home tomorrow pending on ID recs. Looks pretty good though. SUBJECTIVE   Chief complaint: Surgical pain    Patient was stable overnight. VS reviewed. Asymptomatic hypotension. No Fevers. Weakness post op but ambulating with assistance. Surgical pain. Chart reviewed. Updated by nursing staff. Denies chest discomfort or dyspnea. No N/V; (+) belching, but no flatus or BM. Tolerating ADULT DIET; Full Liquid diet. Norco working well. Up with assistance. No urinary complaints. Left hip incision clean, dry and intact.    CURRENT MEDICATIONS   Scheduled Meds:   sodium chloride  500 mL IntraVENous Once sodium chloride flush  5-40 mL IntraVENous 2 times per day    enoxaparin  40 mg SubCUTAneous Daily    sodium chloride flush  5-40 mL IntraVENous 2 times per day    piperacillin-tazobactam  3,375 mg IntraVENous Q8H    amLODIPine  2.5 mg Oral Daily     Continuous Infusions:   sodium chloride      sodium chloride 75 mL/hr at 22 0152    sodium chloride      sodium chloride 125 mL/hr at 22 0546     PRN Meds:.sodium chloride flush, sodium chloride, ondansetron **OR** ondansetron, morphine **OR** morphine, HYDROcodone 5 mg - acetaminophen **OR** HYDROcodone 5 mg - acetaminophen, hyoscyamine, sodium chloride flush, sodium chloride, HYDROmorphone **OR** HYDROmorphone, ALPRAZolam, acetaminophen  OBJECTIVE   CURRENT VITALS:  height is 5' 2\" (1.575 m) and weight is 173 lb 15.1 oz (78.9 kg). Her oral temperature is 98.1 °F (36.7 °C). Her blood pressure is 98/68 and her pulse is 93. Her respiration is 17 and oxygen saturation is 93%. Temperature Range (24h):Temp: 98.1 °F (36.7 °C) Temp  Av.6 °F (36.4 °C)  Min: 96.6 °F (35.9 °C)  Max: 98.1 °F (36.7 °C)  BP Range (11Q): Systolic (39XLC), QQZ:766 , Min:94 , CXH:138     Diastolic (93ZLP), BTF:77, Min:62, Max:84    Pulse Range (24h): Pulse  Av.9  Min: 73  Max: 100  Respiration Range (24h): Resp  Av  Min: 13  Max: 26  Current Pulse Ox (24h):  SpO2: 93 %  Pulse Ox Range (24h):  SpO2  Av.2 %  Min: 92 %  Max: 100 %  Oxygen Amount and Delivery: O2 Flow Rate (L/min): 2 L/min  Incentive Spirometry Tx:            GENERAL: alert, cooperative, no distress  LUNGS: clear to ausculation, without wheezes, rales or rhonci  HEART: normal rate and regular rhythm  ABDOMEN: soft, incisional tenderness, non-distended, bowel sounds hypo  NEUROLOGIC: There are no focalizing motor or sensory deficits. CN II-XII are grossly intact. Meron Red EXTREMITIES: no cyanosis, no clubbing, trace edema bilaterally. Left hip incision with steri-strips. Healing well. No numbness or tingling. In: 0116 [P.O.:400; I.V.:1100]  Out: 500 [Urine:500]  Date 12/29/22 0000 - 12/29/22 2359   Shift 1322-1537 4148-4731 7471-1914 24 Hour Total   INTAKE   Shift Total(mL/kg)       OUTPUT   Urine(mL/kg/hr) 500   500   Shift Total(mL/kg) 500(6.3)   500(6.3)   Weight (kg) 78.9 78.9 78.9 78.9       LABS     Recent Labs     12/27/22  0315 12/28/22  0552 12/29/22  0624   WBC 30.2* 14.4*  --    HGB 12.1 10.3* 10.2*   HCT 34.7* 31.7* 30.3*    279  --    * 136  --    K 4.2 3.9  --    CL 89* 103  --    CO2 26 23  --    BUN 18 13  --    CREATININE 0.8 0.4  --    PHOS  --  3.0  --    CALCIUM 8.0* 7.3*  --         No results for input(s): PTT, INR in the last 72 hours. Invalid input(s): PT  Recent Labs     12/27/22  0315 12/27/22  0729 12/27/22  1704 12/28/22  0029 12/28/22  0552   AST 48*  --   --   --  20   ALT 27  --   --   --  16   BILITOT 0.5  --   --   --  0.3   BILIDIR <0.2  --   --   --   --    LIPASE 16.7  --   --   --  18.4   LACTA  --  1.2 1.0 1.0  --        No results for input(s): TROPONINT in the last 72 hours.   Blood ID, Molecular  Order: [id number]  Status: Final result    Visible to patient: No (not released)    Next appt: 01/11/2023 at 03:00 PM in General Surgery (STACY Morocho - CNP)    Specimen Information: Blood   0 Result Notes  Component Ref Range & Units 12/27/22 0535    Acinetobacter calcoaceticus-baumannii complex Not Detected Not Detected    Bacteroides fragilis Not Detected Not Detected    Candida albicans Not Detected Not Detected    Candida auris Not Detected Not Detected    Candida glabrata Not Detected Not Detected    Candida krusei Not Detected Not Detected    Candida parapsilosis Not Detected Not Detected    Candida tropicalis Not Detected Not Detected    Cryptococcus neoformans/gattii Not Detected Not Detected    CTX-M Not Detected Not Detected    Enterobacter Cloacae Complex Not Detected Not Detected    Enterobacterales Not Detected Detected Abnormal     Enterococcus faecalis Not Detected Not Detected    Enterococcus faecium Not Detected Not Detected    Escherichia Coli Not Detected Not Detected    Haemophilus influenzae Not Detected Not Detected    IMP Not Detected Not Detected    Klebsiella aerogenes Not Detected Not Detected    Klebsiella oxytoca Not Detected Detected Abnormal     Klebsiella pneumoniae group Not Detected Not Detected    KPC (Carbapenem resistance gene) Not Detected Not Detected    Listeria monocytogenes Not Detected Not Detected    MCR-1 Not Detected Not Detected    mecA/C Not Detected N/A    mecA/C and MREJ (MRSA) Not Detected N/A    NDM Not Detected Not Detected    Neisseria Meningitidis, NMNI Not Detected Not Detected    OXA-48-LIKE Not Detected Not Detected    Proteus spp Not Detected Not Detected    Pseudomonas aeruginosa Not Detected Not Detected    Salmonella species Not Detected Not Detected    SERRATIA MARCESCENS Not Detected Not Detected    Staphylococcus Aureus Not Detected Not Detected    Staphylococcus epidermidis Not Detected Not Detected    Staphylococcus lugdunensis Not Detected Not Detected    Staphylococcus spp Not Detected Not Detected    Stenotrophomonas maltophilia Not Detected Not Detected    Streptococcus agalactiae (Group B) Not Detected Not Detected    Streptococcus pyogenes (Group A) Not Detected Not Detected    Streptococcus spp Not Detected Not Detected    Streptococcus pneumoniae Not Detected Not Detected    Antibiotic resistance marker: VANCOMYCIN: Feliciano,B Not Detected N/A    VIM Not Detected Not Detected         Surgical pathology pending  RADIOLOGY   No new imaging    Total time spent in care of patient:  20 minutes collectively between subjective/objective examination, chart review, documentation, clinical reasoning and discussion with attending regarding plan/interval changes.       Electronically signed by STACY Haji CNP on 12/29/2022 at 7:15 AM     Patient seen and examined independently by me earlier this AM. Above discussed and I agree with Hi Negro CNP. See my additional comments below for updated orders and plan. Labs, cultures, and radiographs where available were reviewed. I discussed patient concerns with the patient's nurse and instructions were given. Please see our orders for the updated patient care plan. -- Pain control. Blood cultures positive. IV antibiotics. Infectious disease consultation. Await urine culture results. DVT prophylaxis. Advance diet. Incisional/wound care. A.m. labs. Orthopedic service saw patient given recent surgery and recent postop visit evaluation needs. Clinically, looks pretty good today. Home most likely tomorrow if okay with infectious disease.     Electronically signed by Jimmy Mckeon MD on 12/29/22 at 1:36 PM EST

## 2022-12-29 NOTE — OP NOTE
[address] [place] [address]                                OPERATIVE REPORT    PATIENT NAME: Austin Mccray                 :        1950  MED REC NO:   [id number]                           ROOM:       0010  ACCOUNT NO:   [de-identified]                           ADMIT DATE: 2022  PROVIDER:     JASMYN Johnsonavon Inks OF PROCEDURE:  2022    PREOPERATIVE DIAGNOSIS:  Acute calculous cholecystitis. POSTOPERATIVE DIAGNOSIS:  Acute calculous cholecystitis. PROCEDURE:  Robotic cholecystectomy. SURGEON:  Vita Daley MD    ASSISTANT:  Bernabe Nicholas. [place], [facility]    ANESTHESIA:  General/local.    ESTIMATED BLOOD LOSS:  50 mL. DRAINS:  None. COMPLICATIONS:  None. DISPOSITION:  Stable to the recovery room. INDICATIONS:  The patient is a 27-year-old female who presented  secondary to acute calculous cholecystitis. Concern for common hepatic  duct stone. Underwent ERCP. She is now in need of cholecystectomy. Both operative and nonoperative intervention plans were discussed. Risks of surgery were further discussed. Some of the risks included but  were not limited to bleeding, infection, the need for re-operation,  severe chronic postoperative pain or numbness, major vascular or nerve  injury, cardiopulmonary complications, anesthetic complications, seroma  or hematoma formation, wound breakdown, trocar site herniation, bile  leak, bile duct injury, biloma formation, chronic pain and death. After  all of the questions were answered in their entirety and the patient was  completely aware of current situation, she elected to proceed with the  procedure. DESCRIPTION OF THE PROCEDURE:  After informed consent was signed and  placed on the chart, the patient was taken back to the operating room  and placed supine on the operating room table. General anesthesia was  induced.   She tolerated this well throughout the case. All pressure  points were padded. She was on preoperative antibiotics. Bilateral  lower extremity sequential compression devices were placed prior to  incision. Her abdomen and pelvis was prepped and draped in the usual  sterile standard fashion. A time-out occurred prior to the operation  which not only identified the patient but also the planned procedure to  be performed. At the end of the time-out, there were no questions or  concerns. I began the operation by making a small transverse incision  above the umbilicus. Fascia was elevated and the Veress needle was  inserted. Intra-abdominal cavity was insufflated to a pressure of  approximately 15 mmHg of carbon dioxide gas. The patient tolerated  insufflation well. A 12-mm trocar was placed. Laparoscope was  inserted. Upon initial evaluation, there was no hollow viscus sign or  major vascular injury with the Veress needle insertion or the first  trocar placement. Two other 8-mm trocars were placed in a standard  location under direct vision. A 5-mm assistant was placed in the right  upper lateral quadrant. Gallbladder appeared to be acutely inflamed and  really stuck into the omentum. She was placed in reverse Trendelenburg  with right side elevated. Robot was brought in and docked. Instruments  were placed under direct vision. Once everything was aligned and in  order, I then unscrubbed and back to the console. I began the operation  by tediously taken off the omentum off the anterior wall of the  gallbladder. Gallbladder was densely acutely inflamed, very thickened  and edematous, very friable. Once the anterior wall was dissected off,  I was able to grasp the fundus and elevate this up over the dome of  liver. Infundibulum was then able to be grasped, retracted down the  right lower quadrant.   Over time dissection off the acutely inflamed  friable tissues off the infundibulum, I was able to identify and it was  felt to be the cystic artery and duct. This was confirmed not only  clinically with a critical view but also with Firefly. At that time,  they were doubly clipped and divided close to the gallbladder. Gallbladder was then dissected off the liver bed using electrocautery. Hemostasis was obtained with electrocautery. Irrigation. Irrigant turn  was clear. Clips appeared to be intact. No oozing of bile or blood  from the cystic duct and artery stumps nor from the liver bed. Instruments were removed. Robot was undocked. I scrubbed back into the  table. Large gallbladder was then placed into an endoscopic retrieval  bag and brought out through large trocar site and sent to Pathology for  permanent. One last check demonstrated adequate hemostasis but due to  the friability and acute inflamed tissues, I did place a Surgicel powder  to ensure hemostasis. Trocars were removed. The patient tolerated  desufflation well. Large trocar site was closed at the fascia level  with 0 Vicryl suture on a UR needle x2. At the completion of this,  there were no fascial defects. Subcutaneous tissues were irrigated. Hemostasis was adequate. Skin was reapproximated at all the incisional  sites with 4-0 Vicryl in a subcuticular fashion. Closed incisions were  then clean, dry and Steri-Strips were applied. Dry sterile dressings  were applied. Sponge, needle and instrumentation count was correct at  the end of the procedure. The patient tolerated the procedure well with  no apparent complications and only about 50 mL of blood loss. She was  able to be brought out of general anesthesia and transferred to  postanesthesia care unit stable condition.         Brittni Lazo M.D.    D: 12/28/2022 15:09:38       T: 12/28/2022 15:11:47     [email or web address]  Job#: [id number]     Doc#: [id number]    CC:

## 2022-12-29 NOTE — FLOWSHEET NOTE
12/29/22 1044   Safe Environment   Safety Measures Other (comment)  (vn safety check , pt setting up in chair resting with eyes closed , no signs of distress noted)

## 2022-12-29 NOTE — CONSULTS
CONSULTATION NOTE :ID       Patient - Raoul Fish,  Age - 67 y.o.    - 1950      Room Number - 6K-10/010-A   MRN -  [id number]   [facility]  Date of Admission -  2022  2:41 AM  Patient's PCP: Guzman Carias MD     Requesting Physician: Taran Gates MD    REASON FOR CONSULTATION   Positive blood culture, assist with management  CHIEF COMPLAINT   Abdominal pain x4 days- acute cholecystitis    HISTORY OF PRESENT ILLNESS     This is a very pleasant 67 y.o. female who was admitted to the hospital with a chief complaints of abdominal pain and diarrhea which started on about 4 days before going to the ED aftrer eating some soup. She noted abdominal pain with nausea and vomiting. Initially pain was periumbical and later radiated to RUQ/epigastric. She recently underwent left hip replacement on 22. She was afebrile and tachycardic in ED. WBC elevated at 30. PCT >100. CTA Chest negative for PE. CTAP showed findings consistent with acute cholecystitis. GI performed ERCP and later GS performed robotic cholecystectomy on . WBC and PCT starting to downtrend. Blood cultures came back positive 1 of 2 for GNB. Biofire shows Klebsiella oxytoca. Patient currently on Zosyn. ID consulted for further assistance with management.      PAST MEDICAL  HISTORY       Past Medical History:   Diagnosis Date    Anxiety     Arthritis     Cancer ([facility] [address]     CERVICAL CANCER    COPD (chronic obstructive pulmonary disease) ([facility] [address] 2021    Endometrial cancer (HCC)     cervical CA at age 32    Female bladder prolapse     with rectocele    Hypertension        PAST SURGICAL HISTORY     Past Surgical History:   Procedure Laterality Date    BACK SURGERY      CHOLECYSTECTOMY, LAPAROSCOPIC N/A 2022    ROBOTIC CHOLECYSTECTOMY performed by Taran Gates MD at [place] [address]      ERCP N/A 2022    ERCP performed by Erum Dutta MD at [facility] HYSTERECTOMY (CERVIX STATUS UNKNOWN)  1978    JOINT REPLACEMENT Left 05/18/2017    Lt total shoulder , Dr Sangeetha Gabriel N/A 8/12/2022    L1-5 Decompression L1-5 Posterior Fusion performed by Thurnell Saint, MD at [facility] Right 02/2018         MEDICATIONS:       Scheduled Meds:   cefTRIAXone (ROCEPHIN) IV  2,000 mg IntraVENous Q24H    sodium chloride  500 mL IntraVENous Once    sodium chloride flush  5-40 mL IntraVENous 2 times per day    enoxaparin  40 mg SubCUTAneous Daily    sodium chloride flush  5-40 mL IntraVENous 2 times per day    amLODIPine  2.5 mg Oral Daily     Continuous Infusions:   sodium chloride      sodium chloride 75 mL/hr at 12/29/22 0152    sodium chloride       PRN Meds:sodium chloride flush, sodium chloride, ondansetron **OR** ondansetron, morphine **OR** morphine, HYDROcodone 5 mg - acetaminophen **OR** HYDROcodone 5 mg - acetaminophen, hyoscyamine, sodium chloride flush, sodium chloride, HYDROmorphone **OR** HYDROmorphone, ALPRAZolam, acetaminophen  Allergies:   ALLERGIES:    Codeine    SOCIAL HISTORY:     TOBACCO:   reports that she quit smoking about 15 years ago. Her smoking use included cigarettes. She started smoking about 53 years ago. She has a 22.00 pack-year smoking history. She has never used smokeless tobacco.     ETOH:   reports current alcohol use of about 4.0 standard drinks per week. Patient currently lives at home      FAMILY HISTORY:         Problem Relation Age of Onset    COPD Mother     Emphysema Mother     Other Father         diverticulitis    Breast Cancer Maternal Aunt 46    Breast Cancer Maternal Aunt 62       REVIEW OF SYSTEMS:     Constitutional: no fever, no night sweats, no fatigue, no weight loss. Head: no head ache , no head injury, no migranes. Eye: no eye discharge, blurring of vision, no double vision,no eye pain.   Ears: no hearing difficulty, no tinnitus  Mouth/throat: no ulceration, dental caries , dysphagia, no hoarseness and voice change  Respiratory: no cough no chest pain,no shortness of breath,no wheezing  CVS: no palpitation, no chest pain,   GI: Mild abdominal pain around surgical incisions  MARY: no dysuria, frequency and urgency, no hematuria, no kidney stones  Musculoskeletal: no joint pain, swelling , stiffness,  Endocrine: no polyuria, polydipsia, no cold or heat intolerance  Hematology: no anemia, no easy brusing or bleeding, no hx of clotting disorder  Dermatology: no skin rash, no skin lesions, no pruritis,  Neurological:no headaches,no dizziness, no seizure, no numbness. Psychiatry: no depression, no anxiety,no panic attacks, no suicide ideation    PHYSICAL EXAM:     /66   Pulse 93   Temp 97.5 °F (36.4 °C) (Oral)   Resp 18   Ht 5' 2\" (1.575 m)   Wt 173 lb 15.1 oz (78.9 kg)   SpO2 96%   BMI 31.81 kg/m²     General apperance:  Awake, alert, not in distress. HEENT: pink conjunctiva, unicteric sclera, moist oral mucosa. Chest:  CTA b/l. No wheezes rales rhonchi  Cardiovascular:  RRR ,S1S2, no murmur or gallop. Abdomen:  Soft, non tender to palpation. Surgical incisions from recent robotic surgery present. No erythema or drainage noted. Extremities: Left hip surgical scar. No erythema/edema/drainage noted. Skin:  Warm and dry.   CNS:A&Ox3    LABS:     CBC:   Recent Labs     12/27/22  0315 12/28/22  0552 12/29/22  0624   WBC 30.2* 14.4* 13.1*   HGB 12.1 10.3* 10.1*  10.2*    279 329     BMP:    Recent Labs     12/27/22  0315 12/28/22  0552 12/29/22  0624   * 136 139   K 4.2 3.9 4.1   CL 89* 103 107   CO2 26 23 18*   BUN 18 13 14   CREATININE 0.8 0.4 0.4   GLUCOSE 82 132* 138*     Calcium:  Recent Labs     12/29/22  0624   CALCIUM 7.1*      Recent Labs     12/28/22  0552   PHOS 3.0      Recent Labs     12/29/22  0624   ALKPHOS 122   ALT 14   AST 18   PROT 4.3*   BILITOT 0.3   BILIDIR <0.2   LABALBU 2.4*     Amylase and Lipase:  Recent Labs     12/28/22  0029   LACTA 1.0     Lactic Acid:   Recent Labs     12/28/22  0029   LACTA 1.0     Troponin: No results for input(s): CKTOTAL, CKMB, TROPONINI in the last 72 hours. BNP: No results for input(s): BNP in the last 72 hours. Lipids: No results for input(s): CHOL, TRIG, HDL, LDL, LDLCALC in the last 72 hours. ABGs: No results found for: PHART, PO2ART, YWG5BOX, RSZ8UWV, BEART    Cultures: Blood cultures 1 of 2 positive for GNB. Biofire shows Klebsiella Oxytoca     CTA Chest - No acute abnormalitiy. Negative for PE. Old granulomatous exposure noted. CTAP  -pronounced gallbladder distention with marked wall thickening. Pancreatic duct is dilated. MRCP-shows gallbladder wall thickening with pericholecystic inflammation/gallbladder distention. A 4 mm calculus seen within the common hepatic duct. CBD dilated at 1.2 cm. UA:   Recent Labs     12/27/22  0619   PHUR 6.0   COLORU YELLOW   PROTEINU NEGATIVE   BLOODU TRACE*   RBCUA 0-2   WBCUA 5-9   BACTERIA MANY   NITRU POSITIVE*   GLUCOSEU NEGATIVE   BILIRUBINUR NEGATIVE   UROBILINOGEN 0.2   KETUA NEGATIVE       IMAGING:    Micro:   Lab Results   Component Value Date/Time    BC No growth 24 hours. 12/27/2022 05:35 AM       Problem list of patient      Patient Active Problem List   Diagnosis Code    COPD (chronic obstructive pulmonary disease) ([facility] [address] J44.9    Osteoarthritis of multiple joints M15.9    Hypertension I10    Lumbar stenosis with neurogenic claudication M48.062    BRYCE (generalized anxiety disorder) F41.1    Acute cholecystitis K81.0    Right upper quadrant abdominal pain R10.11       Impression and Recommendation:   Klebsiella Bacteremia - 1 of 2 BC positive. Biofire positive for Klebsiella Oxytoca   -Secondary to calculous cholecystitis with probable cholangitis.    -On IV Zosyn. No previous history of ESBL. - Will tighten ABX coverage. Switch to Rocephin. - Will need 7 days Antibiotics total from removal of GB. Await final C&S and transition to PO based on culture. Acute Calculus cholecystitis - s/p robotic cholecystectomy 12/28  Sepsis, secondary to above, POA, resolving. S/p Left hip arthroplasty 12/12 by Dr. Gianni Lacey  Hx of COPD  Hx of Cervical Cancer S/p Hysterectomy  HTN    Infection Control:   Standard precautions    Discharge Planning (Coordination of out patient care: Thank you Dottie Avalos MD for allowing me to participate in this patient's care. Case and plan developed in coordination with Dr. Manuel Vora, DO, PGY2 12/29/2022 11:28 AM   Patient was seen and examined face-to-face by me  The chart, progress notes, labs and radiographs were reviewed. Case discussed with the nurse. Questions and concerns were addressed. I agree with the progress note. will narrow antibiotic to rocephin pending cx report

## 2022-12-29 NOTE — CARE COORDINATION
12/29/22, 9:02 AM EST    DISCHARGE ON [address] [facility] day: 2  Location: -10/010-A Reason for admit: Acute cholecystitis [K81.0]  Acute acalculous cholecystitis [K81.0]  Right upper quadrant abdominal pain [R10.11]  Leukocytosis, unspecified type [D72.829]  Sepsis, due to unspecified organism, unspecified whether acute organ dysfunction present [facility]) [A41.9]   Procedure: 12/27 ERCP  12/28 robotic [place]  Barriers to Discharge: CO2 18, Ca+ 7.1, WBC 13.1. +BC. ID consult pending. IVF, IV zosyn, pain control. PCP: Giovanni Aguirre MD  Readmission Risk Score: 15%  Patient Goals/Plan/Treatment Preferences: Home alone with [facility].

## 2022-12-29 NOTE — PROGRESS NOTES
Pharmacy Note  BioFire Result    Marco Quiñones is a 67 y.o. female, with a positive blood culture result    Communication received from Ange, laboratory employee on 12/28/2022 at 8:49 PM    First Gram stain result: gram negative bacilli    BioFire BCID result: Klebsiella oxytoca no resistance genes detected    BioFire BCID and gram stain congruent? Yes    Suspected source? cholecystitis    Patient chart has been reviewed for signs/symptoms of infection: Yes  /80   Pulse 96   Temp (!) 96.6 °F (35.9 °C) (Oral)   Resp 18   Ht 5' 2\" (1.575 m)   Wt 173 lb 11.6 oz (78.8 kg)   SpO2 92%   BMI 31.77 kg/m²   Lab Results   Component Value Date    WBC 14.4 (H) 12/28/2022     Allergies reviewed  Codeine    Renal function reviewed  Estimated Creatinine Clearance: 124 mL/min (based on SCr of 0.4 mg/dL). Current antibiotic regimen: Zosyn    Intervention needed: No    Individual contacted: Nurse Sunshine Hale    Recommendations: Continue current therapy. Ok to inform provider in am of biofire results since the antibiotics will cover the bacteria. Recommendations accepted?  Yes    [place], [facility]  12/28/2022 8:49 PM

## 2022-12-30 VITALS
HEART RATE: 94 BPM | RESPIRATION RATE: 18 BRPM | DIASTOLIC BLOOD PRESSURE: 63 MMHG | BODY MASS INDEX: 32.01 KG/M2 | HEIGHT: 62 IN | OXYGEN SATURATION: 96 % | SYSTOLIC BLOOD PRESSURE: 96 MMHG | TEMPERATURE: 98 F | WEIGHT: 173.94 LBS

## 2022-12-30 LAB
BLOOD CULTURE, ROUTINE: ABNORMAL
ERYTHROCYTE [DISTWIDTH] IN BLOOD BY AUTOMATED COUNT: 15.7 % (ref 11.5–14.5)
ERYTHROCYTE [DISTWIDTH] IN BLOOD BY AUTOMATED COUNT: 49 FL (ref 35–45)
HCT VFR BLD CALC: 28.4 % (ref 37–47)
HEMOGLOBIN: 9.7 GM/DL (ref 12–16)
MCH RBC QN AUTO: 29.2 PG (ref 26–33)
MCHC RBC AUTO-ENTMCNC: 34.2 GM/DL (ref 32.2–35.5)
MCV RBC AUTO: 85.5 FL (ref 81–99)
ORGANISM: ABNORMAL
PLATELET # BLD: 292 THOU/MM3 (ref 130–400)
PMV BLD AUTO: 9.5 FL (ref 9.4–12.4)
RBC # BLD: 3.32 MILL/MM3 (ref 4.2–5.4)
WBC # BLD: 10.4 THOU/MM3 (ref 4.8–10.8)

## 2022-12-30 PROCEDURE — 6370000000 HC RX 637 (ALT 250 FOR IP): Performed by: SURGERY

## 2022-12-30 PROCEDURE — 36415 COLL VENOUS BLD VENIPUNCTURE: CPT

## 2022-12-30 PROCEDURE — 6360000002 HC RX W HCPCS: Performed by: SURGERY

## 2022-12-30 PROCEDURE — 6370000000 HC RX 637 (ALT 250 FOR IP): Performed by: INTERNAL MEDICINE

## 2022-12-30 PROCEDURE — 97535 SELF CARE MNGMENT TRAINING: CPT

## 2022-12-30 PROCEDURE — 85027 COMPLETE CBC AUTOMATED: CPT

## 2022-12-30 PROCEDURE — 97530 THERAPEUTIC ACTIVITIES: CPT

## 2022-12-30 PROCEDURE — 2580000003 HC RX 258: Performed by: SURGERY

## 2022-12-30 RX ORDER — HYDROCODONE BITARTRATE AND ACETAMINOPHEN 5; 325 MG/1; MG/1
1 TABLET ORAL EVERY 6 HOURS PRN
Qty: 20 TABLET | Refills: 0 | Status: SHIPPED | OUTPATIENT
Start: 2022-12-30 | End: 2023-01-06

## 2022-12-30 RX ORDER — CIPROFLOXACIN 500 MG/1
750 TABLET, FILM COATED ORAL 2 TIMES DAILY
Qty: 21 TABLET | Refills: 0 | Status: SHIPPED | OUTPATIENT
Start: 2022-12-30 | End: 2023-01-06

## 2022-12-30 RX ORDER — ONDANSETRON 4 MG/1
4 TABLET, ORALLY DISINTEGRATING ORAL EVERY 8 HOURS PRN
Qty: 10 TABLET | Refills: 0 | Status: SHIPPED | OUTPATIENT
Start: 2022-12-30

## 2022-12-30 RX ORDER — AMLODIPINE BESYLATE 2.5 MG/1
2.5 TABLET ORAL DAILY
Qty: 30 TABLET | Refills: 11
Start: 2022-12-30

## 2022-12-30 RX ADMIN — HYDROCODONE BITARTRATE AND ACETAMINOPHEN 2 TABLET: 5; 325 TABLET ORAL at 13:35

## 2022-12-30 RX ADMIN — SODIUM CHLORIDE, PRESERVATIVE FREE 10 ML: 5 INJECTION INTRAVENOUS at 08:28

## 2022-12-30 RX ADMIN — HYDROCODONE BITARTRATE AND ACETAMINOPHEN 1 TABLET: 5; 325 TABLET ORAL at 02:24

## 2022-12-30 RX ADMIN — CIPROFLOXACIN HYDROCHLORIDE 750 MG: 250 TABLET, FILM COATED ORAL at 13:35

## 2022-12-30 RX ADMIN — HYDROCODONE BITARTRATE AND ACETAMINOPHEN 2 TABLET: 5; 325 TABLET ORAL at 08:29

## 2022-12-30 RX ADMIN — ENOXAPARIN SODIUM 40 MG: 100 INJECTION SUBCUTANEOUS at 08:29

## 2022-12-30 ASSESSMENT — PAIN DESCRIPTION - LOCATION
LOCATION: ABDOMEN
LOCATION: GENERALIZED
LOCATION: ABDOMEN
LOCATION: ABDOMEN

## 2022-12-30 ASSESSMENT — PAIN SCALES - GENERAL
PAINLEVEL_OUTOF10: 5
PAINLEVEL_OUTOF10: 7
PAINLEVEL_OUTOF10: 1
PAINLEVEL_OUTOF10: 6

## 2022-12-30 ASSESSMENT — PAIN - FUNCTIONAL ASSESSMENT: PAIN_FUNCTIONAL_ASSESSMENT: ACTIVITIES ARE NOT PREVENTED

## 2022-12-30 ASSESSMENT — PAIN DESCRIPTION - ORIENTATION
ORIENTATION: RIGHT
ORIENTATION: RIGHT
ORIENTATION: MID
ORIENTATION: RIGHT

## 2022-12-30 ASSESSMENT — PAIN DESCRIPTION - DESCRIPTORS
DESCRIPTORS: ACHING
DESCRIPTORS: DISCOMFORT;SHARP
DESCRIPTORS: ACHING

## 2022-12-30 NOTE — CARE COORDINATION
12/30/22, 1:48 PM EST    Patient goals/plan/ treatment preferences discussed by  and . Patient goals/plan/ treatment preferences reviewed with patient/ family. Patient/ family verbalize understanding of discharge plan and are in agreement with goal/plan/treatment preferences. Understanding was demonstrated using the teach back method. AVS provided by RN at time of discharge, which includes all necessary medical information pertaining to the patients current course of illness, treatment, post-discharge goals of care, and treatment preferences. Services At/After Discharge: Home Health       IMM Letter  IMM Letter given to Patient/Family/Significant other/Guardian/POA/by[de-identified] Zita Morales CM  IMM Letter date given[de-identified] 12/30/22  IMM Letter time given[de-identified] 1390        Patient discharge home today SW ursula Moore at [address] [place] [address] regarding discharge.

## 2022-12-30 NOTE — PLAN OF CARE
Problem: Discharge Planning  Goal: Discharge to home or other facility with appropriate resources  12/30/2022 0804 by Aarti Lugo RN  Outcome: Progressing  12/30/2022 0103 by French Fritz RN  Outcome: Progressing  Flowsheets (Taken 12/30/2022 0103)  Discharge to home or other facility with appropriate resources:   Identify barriers to discharge with patient and caregiver   Arrange for needed discharge resources and transportation as appropriate   Identify discharge learning needs (meds, wound care, etc)   Arrange for interpreters to assist at discharge as needed   Refer to discharge planning if patient needs post-hospital services based on physician order or complex needs related to functional status, cognitive ability or social support system  12/29/2022 1928 by French Fritz RN  Outcome: Progressing  Flowsheets (Taken 12/29/2022 1928)  Discharge to home or other facility with appropriate resources:   Identify barriers to discharge with patient and caregiver   Arrange for needed discharge resources and transportation as appropriate   Identify discharge learning needs (meds, wound care, etc)   Arrange for interpreters to assist at discharge as needed     Problem: Discharge Planning  Goal: Discharge to home or other facility with appropriate resources  12/30/2022 0804 by Aarti Lugo RN  Outcome: Progressing  12/30/2022 0103 by French Fritz RN  Outcome: Progressing  Flowsheets (Taken 12/30/2022 0103)  Discharge to home or other facility with appropriate resources:   Identify barriers to discharge with patient and caregiver   Arrange for needed discharge resources and transportation as appropriate   Identify discharge learning needs (meds, wound care, etc)   Arrange for interpreters to assist at discharge as needed   Refer to discharge planning if patient needs post-hospital services based on physician order or complex needs related to functional status, cognitive ability or social support system  12/29/2022 1928 by Edyta Ricks RN  Outcome: Progressing  Flowsheets (Taken 12/29/2022 1928)  Discharge to home or other facility with appropriate resources:   Identify barriers to discharge with patient and caregiver   Arrange for needed discharge resources and transportation as appropriate   Identify discharge learning needs (meds, wound care, etc)   Arrange for interpreters to assist at discharge as needed     Problem: Pain  Goal: Verbalizes/displays adequate comfort level or baseline comfort level  12/30/2022 0804 by Ulices Harris RN  Outcome: Progressing  12/30/2022 0103 by Edyta Ricks RN  Outcome: Progressing  Flowsheets (Taken 12/30/2022 0103)  Verbalizes/displays adequate comfort level or baseline comfort level:   Encourage patient to monitor pain and request assistance   Assess pain using appropriate pain scale   Administer analgesics based on type and severity of pain and evaluate response   Implement non-pharmacological measures as appropriate and evaluate response   Consider cultural and social influences on pain and pain management  Note: Encouraged patient to verbalize any breakthrough pain. No breakthrough pain within the shift. 12/29/2022 1928 by Edyta Ricks RN  Outcome: Progressing  Flowsheets (Taken 12/29/2022 1928)  Verbalizes/displays adequate comfort level or baseline comfort level:   Encourage patient to monitor pain and request assistance   Assess pain using appropriate pain scale   Administer analgesics based on type and severity of pain and evaluate response   Implement non-pharmacological measures as appropriate and evaluate response   Consider cultural and social influences on pain and pain management  Note: Promoted rest and comfort to patient.  Instructed to      Problem: Pain  Goal: Verbalizes/displays adequate comfort level or baseline comfort level  12/30/2022 0804 by Ulices Harris RN  Outcome: Progressing  12/30/2022 0103 by Edyta Ricks RN  Outcome: Progressing  Flowsheets (Taken 12/30/2022 0103)  Verbalizes/displays adequate comfort level or baseline comfort level:   Encourage patient to monitor pain and request assistance   Assess pain using appropriate pain scale   Administer analgesics based on type and severity of pain and evaluate response   Implement non-pharmacological measures as appropriate and evaluate response   Consider cultural and social influences on pain and pain management  Note: Encouraged patient to verbalize any breakthrough pain. No breakthrough pain within the shift. 12/29/2022 1928 by Calixto Elena RN  Outcome: Progressing  Flowsheets (Taken 12/29/2022 1928)  Verbalizes/displays adequate comfort level or baseline comfort level:   Encourage patient to monitor pain and request assistance   Assess pain using appropriate pain scale   Administer analgesics based on type and severity of pain and evaluate response   Implement non-pharmacological measures as appropriate and evaluate response   Consider cultural and social influences on pain and pain management  Note: Promoted rest and comfort to patient. Instructed to      Problem: Skin/Tissue Integrity  Goal: Absence of new skin breakdown  Description: 1. Monitor for areas of redness and/or skin breakdown  2. Assess vascular access sites hourly  3. Every 4-6 hours minimum:  Change oxygen saturation probe site  4. Every 4-6 hours:  If on nasal continuous positive airway pressure, respiratory therapy assess nares and determine need for appliance change or resting period. 12/30/2022 0804 by Ovidio Smith RN  Outcome: Progressing  12/30/2022 0103 by Calixto Elena RN  Outcome: Progressing  Note: No new skin issues during the shift. 12/29/2022 1928 by Calixto Elena RN  Outcome: Progressing  Note: No new skin issues during the shift. Problem: Skin/Tissue Integrity  Goal: Absence of new skin breakdown  Description: 1. Monitor for areas of redness and/or skin breakdown  2. Assess vascular access sites hourly  3. Every 4-6 hours minimum:  Change oxygen saturation probe site  4. Every 4-6 hours:  If on nasal continuous positive airway pressure, respiratory therapy assess nares and determine need for appliance change or resting period. 12/30/2022 0804 by Adriana Groves RN  Outcome: Progressing  12/30/2022 0103 by Poornima Pickard RN  Outcome: Progressing  Note: No new skin issues during the shift. 12/29/2022 1928 by Poornima Pickard RN  Outcome: Progressing  Note: No new skin issues during the shift.

## 2022-12-30 NOTE — PROGRESS NOTES
[facility] - Dr. Zhanna Martinez  Daily Progress Note    Pt Name: Ángel Hester  Medical Record Number: [id number]  Date of Birth 1950   Today's Date: 12/30/2022    Hospital day # 3     ASSESSMENT   Acute cholecystitis - POD # 2 Status post robotic cholecystectomy  Bacteremia (+) Klebsiella  4 mm calculus within common hepatic duct with sludge within CBD (MRI finding) - Status post ERCP successful biliary sphincterotomy but no stones found  Sepsis secondary to #1 - improving  Hypotension - stable    UTI  Recent left hip arthoplasty 12/12/22  COPD  Hepatic steatosis  Hyponatremia - resolved    has a past medical history of Anxiety, Arthritis, Cancer ([address] COPD (chronic obstructive pulmonary disease) ([facility] [address] Endometrial cancer ([facility] [address] Female bladder prolapse, and Hypertension. PLAN   Diet as tolerated  Wound care to pressure ulcer. Allevyn applied. No need for wound/ostomy consult at this time. Continue to monitor. IV to INT  Blood pressure stable. Asymptomatic. Antibiotics. Blood cultures +. ID following. Appreciate assistance. XIN and SCDs. Lovenox post op. Discussed with ortho. Saw yesterday as courtesy. Appreciate assistance. Pain & nausea control  Labs back to normal   Okay for discharge from our standpoint. Follow up in 10 days in our office. SUBJECTIVE   Chief complaint: Surgical pain    Patient was stable overnight. VS reviewed. Asymptomatic hypotension. 99 temp overnight. Surgical pain controlled. Chart reviewed. Updated by nursing staff. Denies chest discomfort or dyspnea. No N/V; (+) belching, flatus. No BM. Tolerating ADULT DIET; Regular diet. Norco working well. Up with assistance. No urinary complaints. Left hip incision clean, dry and intact.    CURRENT MEDICATIONS   Scheduled Meds:   ciprofloxacin  750 mg Oral 2 times per day    sodium chloride  500 mL IntraVENous Once    sodium chloride flush  5-40 mL IntraVENous 2 times per day    enoxaparin  40 mg SubCUTAneous Daily    sodium chloride flush  5-40 mL IntraVENous 2 times per day    amLODIPine  2.5 mg Oral Daily     Continuous Infusions:   sodium chloride      sodium chloride       PRN Meds:.sodium chloride flush, sodium chloride, ondansetron **OR** ondansetron, morphine **OR** morphine, HYDROcodone 5 mg - acetaminophen **OR** HYDROcodone 5 mg - acetaminophen, hyoscyamine, sodium chloride flush, sodium chloride, HYDROmorphone **OR** HYDROmorphone, ALPRAZolam, acetaminophen  OBJECTIVE   CURRENT VITALS:  height is 5' 2\" (1.575 m) and weight is 173 lb 15.1 oz (78.9 kg). Her oral temperature is 98 °F (36.7 °C). Her blood pressure is 96/63 and her pulse is 94. Her respiration is 18 and oxygen saturation is 96%. Temperature Range (24h):Temp: 98 °F (36.7 °C) Temp  Av.2 °F (36.8 °C)  Min: 97.5 °F (36.4 °C)  Max: 99.7 °F (37.6 °C)  BP Range (19F): Systolic (46PJW), XYU:171 , Min:95 , RLL:118     Diastolic (73BJE), PSZ:43, Min:61, Max:73    Pulse Range (24h): Pulse  Av.2  Min: 94  Max: 106  Respiration Range (24h): Resp  Av.9  Min: 16  Max: 18  Current Pulse Ox (24h):  SpO2: 96 %  Pulse Ox Range (24h):  SpO2  Av.7 %  Min: 93 %  Max: 98 %  Oxygen Amount and Delivery: O2 Flow Rate (L/min): 2 L/min  Incentive Spirometry Tx:            GENERAL: alert, cooperative, no distress  LUNGS: clear to ausculation, without wheezes, rales or rhonci  HEART: normal rate and regular rhythm  ABDOMEN: soft, incisional tenderness, non-distended, bowel sounds hypo  NEUROLOGIC: There are no focalizing motor or sensory deficits. CN II-XII are grossly intact. [facility] EXTREMITIES: no cyanosis, no clubbing, trace edema bilaterally. Left hip incision with steri-strips. Healing well. No numbness or tingling.    In: 800 [P.O.:800]  Out: 7543 [Urine:1750]  Date 22 - 22   Shift 0542-5743 [id number] 8463-0084 24 Hour Total   INTAKE   P.O.  440  440   Shift Total(mL/kg)  440(5.6)  440(5.6)   OUTPUT   Urine(mL/kg/hr) 250(0.4) 600  850   Emesis/NG output  0  0   Other  0  0   Stool  0  0   Blood  0  0   Shift Total(mL/kg) 250(3.2) 600(7.6)  850(10.8)   Weight (kg) 78.9 78.9 78.9 78.9       LABS     Recent Labs     12/28/22  0552 12/29/22  0624 12/30/22  0546   WBC 14.4* 13.1* 10.4   HGB 10.3* 10.1*  10.2* 9.7*   HCT 31.7* 29.5*  30.3* 28.4*    329 292    139  --    K 3.9 4.1  --     107  --    CO2 23 18*  --    BUN 13 14  --    CREATININE 0.4 0.4  --    PHOS 3.0  --   --    CALCIUM 7.3* 7.1*  --         No results for input(s): PTT, INR in the last 72 hours. Invalid input(s): PT  Recent Labs     12/27/22  1704 12/28/22  0029 12/28/22  0552 12/29/22  0624   AST  --   --  20 18   ALT  --   --  16 14   BILITOT  --   --  0.3 0.3   BILIDIR  --   --   --  <0.2   LIPASE  --   --  18.4  --    LACTA 1.0 1.0  --   --        Culture, Blood 2  Order: [id number]  Status: Final result    Visible to patient: No (not released)    Next appt: 01/09/2023 at 10:15 AM in [address] [place] [address] Sarah Benson MD)    Specimen Information: Blood   0 Result Notes  Component 12/27/22 0535    Blood Culture, Routine No growth 24 hours. Current antibiotic therapy ineffective in vitro for isolate.  Abnormal     Organism Klebsiella oxytoca Abnormal     Resulting Agency [address] [facility]        Susceptibility    Klebsiella oxytoca (1)    Antibiotic Interpretation Microscan  Method Status    piperacillin-tazobactam Resistant >=128 mcg/mL BACTERIAL SUSCEPTIBILITY PANEL BY PEPE Final    cefTRIAXone Resistant 16 mcg/mL BACTERIAL SUSCEPTIBILITY PANEL BY PEPE Final    ampicillin Resistant >=32 mcg/mL BACTERIAL SUSCEPTIBILITY PANEL BY PEPE Final    cefepime Sensitive <=1 mcg/mL BACTERIAL SUSCEPTIBILITY PANEL BY PEPE Final    trimethoprim-sulfamethoxazole Sensitive <=20 mcg/mL BACTERIAL SUSCEPTIBILITY PANEL BY PEPE Final    gentamicin Sensitive <=1 mcg/mL BACTERIAL SUSCEPTIBILITY PANEL BY [facility]PE Final    ciprofloxacin Sensitive <=0.25 mcg/mL BACTERIAL SUSCEPTIBILITY PANEL BY [facility] Final                   PATHOLOGY REPORT     Clinical Information: GENERALIZED ABDOMINAL PAIN     FINAL DIAGNOSIS:   Gallbladder, cholecystectomy:             Cholelithiasis and acute cholecystitis. Specimen:   GALLBLADDER       Gross Examination:   The container is labeled [employer], gallbladder. Received in   formalin is an enlarged gallbladder measuring 12 cm in length x 4.5 cm   in diameter. The surface is red-tan and shaggy. There is a   surgically-induced defect in the wall. There is a single 1.5 cm   gallstone. The wall measures 0.5 cm in average thickness and the   mucosal surface is red-tan with fibrinous debris. The cystic duct is   patent. Representative sections including the cystic duct margin are   submitted. 1 ss. SMW/DKR:[id number]     Microscopic Examination:   Microscopic examination was performed. RADIOLOGY   No new imaging    Electronically signed by STACY Chapa CNP on 12/30/2022 at 11:20 AM     Patient seen and examined independently by me earlier this AM. Above discussed and I agree with Georgie Prince CNP. See my additional comments below for updated orders and plan. Labs, cultures, and radiographs where available were reviewed. I discussed patient concerns with the patient's nurse and instructions were given. Please see our orders for the updated patient care plan. -- Tolerating diet. Ambulating. Pain much better controlled. On antibiotics. Blood cultures reviewed. Infectious disease following. Incisional/wound care. Home today. Follow-up in office.     Electronically signed by Elieser Bass MD on 12/30/22 at 1:46 PM EST

## 2022-12-30 NOTE — PLAN OF CARE
Problem: Discharge Planning  Goal: Discharge to home or other facility with appropriate resources  12/30/2022 0103 by Marie Kovacs RN  Outcome: Progressing  Flowsheets (Taken 12/30/2022 0103)  Discharge to home or other facility with appropriate resources:   Identify barriers to discharge with patient and caregiver   Arrange for needed discharge resources and transportation as appropriate   Identify discharge learning needs (meds, wound care, etc)   Arrange for interpreters to assist at discharge as needed   Refer to discharge planning if patient needs post-hospital services based on physician order or complex needs related to functional status, cognitive ability or social support system     Problem: Pain  Goal: Verbalizes/displays adequate comfort level or baseline comfort level  12/30/2022 0103 by Marie Kovacs RN  Outcome: Progressing  Flowsheets (Taken 12/30/2022 0103)  Verbalizes/displays adequate comfort level or baseline comfort level:   Encourage patient to monitor pain and request assistance   Assess pain using appropriate pain scale   Administer analgesics based on type and severity of pain and evaluate response   Implement non-pharmacological measures as appropriate and evaluate response   Consider cultural and social influences on pain and pain management  Note: Encouraged patient to verbalize any breakthrough pain. No breakthrough pain within the shift. Problem: Skin/Tissue Integrity  Goal: Absence of new skin breakdown  Description: 1. Monitor for areas of redness and/or skin breakdown  2. Assess vascular access sites hourly  3. Every 4-6 hours minimum:  Change oxygen saturation probe site  4. Every 4-6 hours:  If on nasal continuous positive airway pressure, respiratory therapy assess nares and determine need for appliance change or resting period. 12/30/2022 0103 by Marie Kovacs RN  Outcome: Progressing  Note: No new skin issues during the shift.       Problem: Safety - Adult  Goal: Free from fall injury  12/30/2022 0103 by José Luis Craig RN  Outcome: Progressing  Flowsheets (Taken 12/30/2022 0103)  Free From Fall Injury:   Instruct family/caregiver on patient safety   Based on caregiver fall risk screen, instruct family/caregiver to ask for assistance with transferring infant if caregiver noted to have fall risk factors  Note: Non skid socks are on bilateral lower extremities. Used gait belt and standard walker during bathroom time. Checked patient every hour for any needs. Problem: ABCDS Injury Assessment  Goal: Absence of physical injury  12/30/2022 0103 by José Luis Craig RN  Outcome: Progressing  Flowsheets (Taken 12/30/2022 0103)  Absence of Physical Injury: Implement safety measures based on patient assessment  Note: No falls during the shift. Problem: Gastrointestinal - Adult  Goal: Minimal or absence of nausea and vomiting  12/30/2022 0103 by José Luis Craig RN  Outcome: Progressing     Problem: Gastrointestinal - Adult  Goal: Maintains adequate nutritional intake  12/30/2022 0103 by José Luis Craig RN  Outcome: Progressing    Care plan reviewed with patient. Patient verbalize understanding of the plan of care and contribute to goal setting.

## 2022-12-30 NOTE — PROGRESS NOTES
Progress note: Infectious diseases    Patient - Ellen Luque,  Age - 67 y.o.    - 1950      Room Number - 6K-10/010-A   MRN -  [id number]   Acct # - [de-identified]  Date of Admission -  2022  2:41 AM    SUBJECTIVE:   She has no new complaints. OBJECTIVE   VITALS    height is 5' 2\" (1.575 m) and weight is 173 lb 15.1 oz (78.9 kg). Her oral temperature is 98 °F (36.7 °C). Her blood pressure is 96/63 and her pulse is 94. Her respiration is 18 and oxygen saturation is 96%.        Wt Readings from Last 3 Encounters:   22 173 lb 15.1 oz (78.9 kg)   10/21/22 158 lb (71.7 kg)   22 162 lb 9.6 oz (73.8 kg)       I/O (24 Hours)    Intake/Output Summary (Last 24 hours) at 2022 1200  Last data filed at 2022 1055  Gross per 24 hour   Intake 440 ml   Output 1750 ml   Net -1310 ml       General Appearance  Awake, alert, oriented,  not  In acute distress  HEENT - normocephalic, atraumatic, pale  conjunctiva,  anicteric sclera  Neck - Supple, no mass  Lungs -  Bilateral   air entry, no rhonchi, no wheeze  Cardiovascular - Heart sounds are normal.     Abdomen - soft, not distended, nontender,   Neurologic -oriented  Skin - No bruising or bleeding  Extremities - No edema, no cyanosis, clubbing     MEDICATIONS:      ciprofloxacin  750 mg Oral 2 times per day    sodium chloride  500 mL IntraVENous Once    sodium chloride flush  5-40 mL IntraVENous 2 times per day    enoxaparin  40 mg SubCUTAneous Daily    sodium chloride flush  5-40 mL IntraVENous 2 times per day    amLODIPine  2.5 mg Oral Daily      sodium chloride      sodium chloride       sodium chloride flush, sodium chloride, ondansetron **OR** ondansetron, morphine **OR** morphine, HYDROcodone 5 mg - acetaminophen **OR** HYDROcodone 5 mg - acetaminophen, hyoscyamine, sodium chloride flush, sodium chloride, HYDROmorphone **OR** HYDROmorphone, ALPRAZolam, acetaminophen      LABS:     CBC:   Recent Labs     12/28/22  0552 12/29/22  0624 12/30/22  0546   WBC 14.4* 13.1* 10.4   HGB 10.3* 10.1*  10.2* 9.7*    329 292     BMP:    Recent Labs     12/28/22  0552 12/29/22  0624    139   K 3.9 4.1    107   CO2 23 18*   BUN 13 14   CREATININE 0.4 0.4   GLUCOSE 132* 138*     Calcium:  Recent Labs     12/29/22  0624   CALCIUM 7.1*     Ionized Calcium:No results for input(s): IONCA in the last 72 hours. Magnesium:No results for input(s): MG in the last 72 hours. Phosphorus:  Recent Labs     12/28/22 0552   PHOS 3.0      Recent Labs     12/28/22  0552 12/29/22  0624   ALKPHOS 142* 122   ALT 16 14   AST 20 18   PROT 5.3* 4.3*   BILITOT 0.3 0.3   BILIDIR  --  <0.2   LABALBU 2.3* 2.4*     Amylase and Lipase:  Recent Labs     12/28/22  0029   LACTA 1.0     Lactic Acid:   Recent Labs     12/28/22  0029   LACTA 1.0     Troponin: No results for input(s): CKTOTAL, CKMB, TROPONINI in the last 72 hours. BNP: No results for input(s): BNP in the last 72 hours. CULTURES:   UA: No results for input(s): SPECGRAV, PHUR, COLORU, CLARITYU, MUCUS, PROTEINU, BLOODU, RBCUA, WBCUA, BACTERIA, NITRU, GLUCOSEU, BILIRUBINUR, UROBILINOGEN, KETUA, LABCAST, LABCASTTY, AMORPHOS in the last 72 hours. Invalid input(s): CRYSTALS  Micro:   Lab Results   Component Value Date/Time    [facility]  12/27/2022 05:35 AM     No growth 24 hours. Current antibiotic therapy ineffective in vitro for isolate. Problem list of patient:     Patient Active Problem List   Diagnosis Code    COPD (chronic obstructive pulmonary disease) ([facility] [address] J44.9    Osteoarthritis of multiple joints M15.9    Hypertension I10    Lumbar stenosis with neurogenic claudication M48.062    BRYCE (generalized anxiety disorder) F41.1    Acute cholecystitis K81.0    Right upper quadrant abdominal pain R10.11         ASSESSMENT/PLAN   Klebsiella Bacteremia - 1 of 2 BC positive.  Biofire positive for Klebsiella Oxytoca              -Secondary to calculous cholecystitis with probable cholangitis: resolved                Acute Calculus cholecystitis - s/p robotic cholecystectomy 12/28     S/p Left hip arthroplasty 12/12 by Dr. Saleem Don  Hx of COPD  Hx of Cervical Cancer S/p Hysterectomy  HTN  Will discharge with oral cipro for one wk.  Will send e-scribe to her pharmacy      Nate Wallace MD, MD, [address] 12/30/2022 12:00 PM

## 2022-12-30 NOTE — PROGRESS NOTES
[address] RENAL TELEMETRY 6K  Occupational Therapy  Daily Note  Time:   Time In: 1595  Time Out: 2498  Timed Code Treatment Minutes: 48 Minutes  Minutes: 53          Date: 2022  Patient Name: Sergio Angel,   Gender: female      Room: [facility]10/010-A  MRN: [id number]  : 1950  (67 y.o.)  Referring Practitioner: STACY Valdes CNP  Diagnosis: acute cholecystitis  Additional Pertinent Hx: Per ER note on 2022:72 y.o. female who presents to the emergency department from home for abdominal pain, vomiting and shortness of breath. The patient had a left hip replacement on the . She has been on pain medications. Started having constipation on Saturday. Took two doses of MOM. Had a lot of loose stool after that which helped the pain. Last night pain returned and has not let up. It has gotten much worse. Pain is largely in the RUQ and epigastric area. Had vomiting on Saturday. Reports some fevers. Restrictions/Precautions:  Restrictions/Precautions: Fall Risk, General Precautions  Position Activity Restriction  Other position/activity restrictions: s/p L THR (anterior approach-no precautions; Dr. Denise Scott at [address]) 2022     SUBJECTIVE: Nurse Delvis Braxton ok'd session, Up in chair upon arrival, agreeable to OT session, cooperative    PAIN: 5/10: abdomin    Vitals: Vitals not assessed per clinical judgement, see nursing flowsheet    COGNITION: WNL    ADL:   Grooming: Modified Independent. Brushed teeth standing sinkside  Bathing: Minimal Assistance. A for washing B feet, patient reported having LH sponge at home, completed in shower with shower bench  Upper Extremity Dressing: with set-up. Hospital gown  Lower Extremity Dressing: Dependent. For donning B slipper socks, donned in shower, sock aid not available  Toileting: Supervision. Toilet Transfer:   Supervision. STS, used grab bar  Shower Transfer: Stand By Assistance.   Cues given for relaxation due to not in right mind set to complete. BALANCE:  Standing Balance: Modified Independent. X 4 minutes with 0-1 UE support    BED MOBILITY:  Not Tested    TRANSFERS:  Sit to Stand:  Supervision. Bedside chair  Stand to Sit: Supervision. FUNCTIONAL MOBILITY:  Assistive Device: Rolling Walker  Assist Level:  Supervision. Distance: To and from bathroom     ASSESSMENT:     Activity Tolerance:  Patient tolerance of  treatment: good. Discharge Recommendations: Home with assist as needed  Equipment Recommendations: Equipment Needed: No  Other: Pt has needed AE/AD at home  Plan: Times Per Week: 5x  Current Treatment Recommendations: Functional mobility training, Balance training, Self-Care / ADL, Safety education & training    Patient Education  Patient Education: ADL's    Goals  Short Term Goals  Time Frame for Short Term Goals: until discharge  Short Term Goal 1: Pt will complete LE dressing with S & 0 vcs for safety using LH AE  Short Term Goal 2: Pt will tolerate standing 3-4 min with S for increased ease of sinkside grooming  Short Term Goal 3: Pt will complete mobility to/from bathroom + with ADL item retrieval with RW, S, & 0-2 vcs for safety  Long Term Goals  Time Frame for Long Term Goals : No LTG set d/t short ELOS    Following session, patient left in safe position with all fall risk precautions in place.

## 2022-12-31 NOTE — DISCHARGE SUMMARY
Amairani Reyes [address] [facility]       Pt Name: Mel Sheehan  MRN: [id number]  YOB: 1950  Primary Care Physician: Sima Ellis MD    Admit date:  12/27/2022  2:41 AM      Discharge date:  12/30/2022  5:07 PM    Admitting Diagnosis:   Acute calculous cholecystitis  4 mm calculus within common hepatic duct with sludge within CBD  Sepsis secondary to #1  UTI  Recent total hip replacement 12/12/22  COPD    Discharge Diagnosis:   1. Right upper quadrant abdominal pain    2. Leukocytosis, unspecified type    3. Acute cholecystitis    4. Sepsis, due to unspecified organism, unspecified whether acute organ dysfunction present ([address]    5. Generalized abdominal pain    6. Primary hypertension    7. S/P laparoscopic cholecystectomy      Admitting Service: General Surgery, Winnie Chavez MD.    Consultants:  ID    History and Physical:  Pt Name: Mel Sheehan  MRN: [id number]  YOB: 1950  Date of evaluation: 12/27/2022  Primary Care Physician: Sima Ellis MD  Patient evaluated at the request of  STACY Gómez-CNP  Reason for evaluation: Acute cholecystitis   IMPRESSIONS:   Acute calculous cholecystitis  4 mm calculus within common hepatic duct with sludge within CBD  Sepsis secondary to #1  UTI  Recent total hip replacement 12/12/22  COPD  does not have any pertinent problems on file. PLANS:   Admit type: inpatient  It is expected this patient's LOS will be: Greater than 2 midnights  Anticipated Disposition Upon Discharge: Home  NPO   Informed consent  Stat MRCP prior to OR to evaluate. Demonstrates dilated CBD with sludge and 4 mm calculus within common hepatic duct. Await urine culture results  Analgesics and antiemetics on a prn basis  IV hydration   Empiric antibiotic coverage  DVT prophylaxis with SCD's. Hold 325 mg aspirin. Home meds as ordered   LFTs okay. Trend WBC.    Imaging reviewed and discussed findings with patient/family. Consulting GI for ERCP recommendations. If unable to do ERCP today then proceeding with cholecystectomy today and ERCP to follow. Awaiting call back from GI. Pros and cons of surgical intervention discussed with patient. Surgical risks and possible complications discussed. Patient agreeable to proceed. SUBJECTIVE:   Chief Complaint:      History of Present Illness:  Billy Myers is a 70-year old female patient who presents to the ER for abdominal pain and diarrhea. Patient recently had a left hip replacement on 12/12 at [facility]. States Saturday she ate some soup and started with periumbilical pain and nausea//vomiting that evening. Patient also had constipation so she took MOM which then gave her diarrhea. Patient states after Saturday night pain migrated to epigastric/RUQ. Pain is sharp and constant. No aggravating or alleviating factors. Some nausea but no further vomiting since Saturday. Still having diarrhea. Last BM yesterday. Appetite decreased since Saturday. No melena or hematochezia. Associated chills and malaise. No significant urinary complaints. No fevers that she knows of. No SOB or chest pain. Last Norco was yesterday. Colonoscopy with Dr. Glendy Drake 2-3 years ago. Never had an EGD. Takes aspirin 325 mg daily. No other [address]. Doing PT/OT for hip replacement. Has follow up at [facility] thursday.       Past Medical History  Past Medical History             Diagnosis Date    Anxiety      Arthritis      Cancer ([address] 1978     CERVICAL CANCER    COPD (chronic obstructive pulmonary disease) [address] 05/24/2021    Endometrial cancer (HCC)       cervical CA at age 32    Female bladder prolapse       with rectocele    Hypertension           Past Surgical History  Past Surgical History             Procedure Laterality Date    BACK SURGERY   1976    COLONOSCOPY        HYSTERECTOMY (CERVIX STATUS UNKNOWN)   1978    JOINT REPLACEMENT Left 05/18/2017     Lt total shoulder , Dr Kristi Westfall LUMBAR FUSION N/A 8/12/2022     L1-5 Decompression L1-5 Posterior Fusion performed by Nir Hand MD at [address] Right 02/2018         Medications  Home Medications           Prior to Admission medications    Medication Sig Start Date End Date Taking? Authorizing Provider   traMADol (ULTRAM) 50 MG tablet Take 50 mg by mouth every 8 hours as needed for Pain. Historical Provider, MD   amLODIPine (NORVASC) 2.5 MG tablet Take 1 tablet by mouth daily 10/21/22     Agustina Funetes MD   cyclobenzaprine (FLEXERIL) 5 MG tablet Take 5 mg by mouth 3 times daily as needed for Muscle spasms  Patient not taking: Reported on 12/6/2022       Historical Provider, MD   ALPRAZolam Daryel Golden) 0.25 MG tablet Take 0.25 mg by mouth 2 times daily as needed for Anxiety. Historical Provider, MD   acetaminophen (TYLENOL) 650 MG extended release tablet Take 650 mg by mouth every 8 hours as needed for Pain       Historical Provider, MD   vitamin D 25 MCG (1000 UT) CAPS Take by mouth       Historical Provider, MD   famotidine (PEPCID) 20 MG tablet Take 20 mg by mouth daily       Historical Provider, MD   diphenhydrAMINE (BENADRYL) 25 MG tablet Take 25 mg by mouth every 6 hours as needed for Itching or Allergies       Historical Provider, MD   docusate sodium (COLACE) 100 MG capsule Take 100 mg by mouth daily       Historical Provider, MD   Multiple Vitamins-Minerals (THERAPEUTIC MULTIVITAMIN-MINERALS) tablet Take 1 tablet by mouth daily. Historical Provider, MD       Scheduled Meds:  Scheduled Medications    vancomycin  20 mg/kg IntraVENous Once         Continuous Infusions:  Infusions Meds        PRN Meds:. Allergies  is allergic to codeine. Family History  family history includes Breast Cancer (age of onset: 46) in her maternal aunt; Breast Cancer (age of onset: 62) in her maternal aunt; COPD in her mother; Emphysema in her mother; Other in her father.   Social History   reports that she quit smoking about 14 years ago. Her smoking use included cigarettes. She started smoking about 53 years ago. She has a 22.00 pack-year smoking history. She has never used smokeless tobacco. She reports current alcohol use of about 4.0 standard drinks per week. She reports that she does not use drugs. Review of Systems:  General positive for  chills, sweats, fatigue, and malaise. No significant unexpected weight change. HEENT Denies any diplopia, tinnitus or vertigo. No chronic headaches. Resp Denies any shortness of breath, cough or wheezing  Cardiac Denies any chest pain, palpitations, claudication or edema  GI Positive for diarrhea, nausea, vomiting, and abdominal pain. Denies any melena, hematochezia, hematemesis or pyrosis   Denies any frequency, urgency, hesitancy or incontinence  Heme Denies bruising or bleeding easily  Endocrine Denies any history of diabetes or thyroid disease  Neuro Denies any focal motor or sensory deficits  Musculoskeletal  Denies osteoarthritis. No gout. Recent left hip replacement. Psychiatric  Denies any severe depression or agitation. No panic attacks. No suicidal ideation. OBJECTIVE:   CURRENT VITALS:  weight is 160 lb (72.6 kg). Her temperature is 99.5 °F (37.5 °C). Her blood pressure is 107/63 and her pulse is 111 (abnormal). Her respiration is 19 and oxygen saturation is 99%.    Temperature Range (24h):Temp: 99.5 °F (37.5 °C) Temp  Av.3 °F (37.4 °C)  Min: 99.1 °F (37.3 °C)  Max: 99.5 °F (37.5 °C)  BP Range (77M): Systolic (35JWK), LBP:438 , Min:97 , SPA:719     Diastolic (72IVE), OTX:42, Min:58, Max:73     Pulse Range (24h): Pulse  Av.7  Min: 111  Max: 128  Respiration Range (24h): Resp  Av.9  Min: 18  Max: 22  Current Pulse Ox (24h):  SpO2: 99 %  Pulse Ox Range (24h):  SpO2  Av.9 %  Min: 92 %  Max: 99 %  Oxygen Amount and Delivery: O2 Flow Rate (L/min): 2 L/min  CONSTITUTIONAL: Alert and oriented times 3, no acute distress and cooperative to examination with proper mood and affect. SKIN: Skin color, texture, turgor normal. No rashes or lesions. Left hip incision well intact without signs of infection. LYMPH: no cervical nodes, no inguinal nodes  HEENT: Head is normocephalic, atraumatic. NECK: Supple, symmetrical, trachea midline  CHEST/LUNGS:  normal respiratory rate and rhythm, lungs clear to auscultation without wheezes, rales or rhonchi. CARDIOVASCULAR: Heart sounds are normal. Tachycardia with rhythm without murmur, gallop or rub. Normal S1 and S2.  ABDOMEN: Normal shape. Scar(s) present. Normal bowel sounds. soft, nondistended, no masses or organomegaly. Tenderness: RUQ with mild guarding. NEUROLOGIC: There are no focalizing motor or sensory deficits. CN II-XII are grossly intact. EXTREMITIES: no cyanosis, no clubbing, and no edema. LABS:            Recent Labs     12/27/22  0315 12/27/22  0619 12/27/22  0729   WBC 30.2*  --   --    HGB 12.1  --   --    HCT 34.7*  --   --      --   --    *  --   --    K 4.2  --   --    CL 89*  --   --    CO2 26  --   --    BUN 18  --   --    CREATININE 0.8  --   --    CALCIUM 8.0*  --   --    AST 48*  --   --    ALT 27  --   --    BILITOT 0.5  --   --    BILIDIR <0.2  --   --    LIPASE 16.7  --   --    LACTA  --   --  1.2   NITRU  --  POSITIVE*  --    COLORU  --  YELLOW  --    BACTERIA  --  MANY  --       RADIOLOGY:   I have personally reviewed the following films:     PROCEDURE: MRI ABDOMEN W WO CONTRAST MRCP       CLINICAL INFORMATION: evaluate pancreatic duct prior to surgery, CT with possibility of obstruction in the region of the ampulla       COMPARISON: CT abdomen and pelvis 12/27/2022       TECHNIQUE: Multiplanar and multisequence MRI abdomen without and with IV contrast.  Routine MRCP was also performed. The MRCP examination includes 3D reconstructions of the biliary tree and the pancreatic duct. CONTRAST: 15 mL of ProHance  intravenously.            FINDINGS:        LOWER THORAX: Plate like atelectasis and/or scarring seen in the visualized lung bases. A small hiatal hernia is present. HEPATOBILIARY: Fatty infiltration of the liver. Tiny cysts are seen in the liver. Moderate hepatomegaly. Unremarkable hepatic surface morphology. The gallbladder is distended. Gallbladder wall thickening is seen. Sludge is present gallbladder. There is a    1.7 cm stone at the gallbladder neck. The common bile duct is dilated 1.2 cm. Sludge is present within the common bile duct. There is a 4 mm calculus in the common hepatic duct. PANCREAS AND SPLEEN: Mild atrophy of the pancreas. Mild pancreatic duct dilatation. No peripancreatic abnormality. The spleen is not enlarged       RETROPERITONEUM: A 1.1 cm nonenhancing cyst is seen in the superior pole of the left kidney. The kidneys are otherwise unremarkable. The adrenal glands are not enlarged       BOWEL AND PERITONEUM: Very small ascites. No bowel obstruction or acute inflammatory bowel process. María Gallery VASCULAR: The abdominal aorta is not aneurysmal.       LYMPH NODES: No significantly enlarged lymph nodes are seen. BONES: No aggressive bony lesions noted. Decompressive laminectomy has been performed at L1-L5. Pedicle screw and julia fixation noted. Scoliosis. Impression   1. Gallbladder wall thickening with pericholecystic inflammation, sludge, gallstone in the gallbladder neck and gallbladder distention. Findings relate to acute cholecystitis. 2. Sludge is present within the common bile duct. A 4 mm calculus is seen within the common hepatic duct. 3. The common bile duct is dilated at 1.2 cm. 4. Mild pancreatic duct dilatation. 5. Hepatic steatosis. **This report has been created using voice recognition software. It may contain minor errors which are inherent in voice recognition technology. **       Final report electronically signed by Dr Zen Forrest on 12/27/2022 10:14 AM      EXAM: CT OF ABDOMEN/PELVIS WITH CONTRAST:       COMPARISON:   None       TECHNIQUE: Enteric contrast was not administered. Contiguous axial images    from lung bases through ischial tuberosities during uneventful intravenous    administration of iodine contrast. Coronal and sagittal reformatted images    were performed and reviewed. FINDINGS:       The gallbladder is distended marked wall thickening. Intrahepatic and    extrahepatic duct dilatation is present. The common bile duct measures up    to 11 mm diameter. No radiopaque stone is identified within the    gallbladder or within the ducts. No liver or spleen lesion identified. Several small calcific granulomas of    the spleen are incidentally noted. No acute pancreatic abnormality is identified. There is a prominent    duodenal diverticulum which projects into the pancreatic tissue of the    head, and there is pancreatic duct dilatation measuring at least 6 mm. No    obvious pancreatic head mass lesion is appreciated. The adrenals and kidneys are normal appearing. No renal stone or urinary tract obstruction is identified. Atherosclerosis of aorta noted. No aneurysm. IVC and portal veins    unremarkable. No adenopathy or ascites is identified. Stomach and small bowel structures are unremarkable. Appendix not visualized, no secondary findings of appendicitis. A few sigmoid diverticuli are present. No evidence of diverticulitis. Apparent previous hysterectomy. Urinary bladder unremarkable. No acute osseous abnormality. Multilevel laminectomies and pedicle screw    fusion from the L1 level through the L5 level. Previous hip arthroplasties bilaterally. Impression   1. Pronounced gallbladder and bile duct distention and marked wall    thickening of the gallbladder. Acute cholecystitis is suspected. Cholelithiasis is not specifically demonstrated on this study, but may be    present.  Additionally, the pancreatic duct is dilated, raising the    possibility of obstruction in the region of the ampulla. No pancreatic    head lesion is appreciated. 2. Additional findings: Atherosclerosis, diverticuli of the sigmoid    region, prior hysterectomy, previous osseous surgical changes. This document has been electronically signed by: Fam Velazquez MD    on 12/27/2022 07:57 AM       All CTs at this facility use dose modulation techniques and iterative    reconstructions, and/or weight-based dosing   when appropriate to reduce radiation to a low as reasonably achievable. EXAM: CT OF CHEST WITH CONTRAST, CT PULMONARY ANGIOGRAM:       COMPARISON:   None       TECHNIQUE: Contiguous axial images from lung apices through bases during    uneventful intravenous administration of iodinated contrast, CT pulmonary    angiogram technique. Conventional and MIP reformatted images were also    reviewed. FINDINGS:       The pulmonary arteries are well-opacified and show no evidence of filling    defect. No pulmonary thromboembolus is identified within pulmonary    arterial branches of either lung. The lungs are clear. No infiltrate, consolidation, atelectasis or mass is    appreciated. No pleural or pericardial effusion is evident. Heart size is normal. Small calcified right hilar and mediastinal lymph    nodes are consistent with old granulomatous exposure no noncalcified    adenopathy. Central airways are unremarkable. No significant abnormality of the aorta is appreciated. No evidence of    aneurysm or dissection. No significant bone lesion is identified. Impression   No acute abnormality. No evidence of pulmonary embolus. Old granulomatous    exposure noted.        This document has been electronically signed by: Fam Velazquez MD    on 12/27/2022 07:52 AM       All CTs at this facility use dose modulation techniques and iterative    reconstructions, and/or weight-based dosing when appropriate to reduce radiation to a low as reasonably achievable. 3D Post-processing was performed on this study. Electronically signed by STACY Romero CNP on 12/27/2022 at 8:20 AM            Patient seen and examined independently by me 12/27/2022      I personally supervised the PA/NP in the evaluation, management and development of the treatment plan for [facility] MORALES Bianchi  on the same date of service as above. I personally interviewed Rena Kessler   and  discussed his review of symptoms as able due to the patient's condition, as well as performed an individual physical exam on the same   date of service as above. In addition I discussed the patient's condition and treatment options with the patient, if able, and/or designated family if available. I have also reviewed and agree with the past medical,  family and social history updates as well as care plans unless otherwise noted below. All questions were answered. I examined independently and reviewed relevant data myself and may have done so in the context of team rounds. A full chart review was performed by me. I attest that this medical record entry accurately reflects signatures and notations that I made in my capacity as an M. D. when I treated and diagnosed Rena Kessler on the date of service above      I was responsible for all medical decision making involving this encounter. I identified and/or confirmed all problems associated with this patient encounter by my own direct physical examination of this patient and review of all radiology studies and labwork  that were ordered and available. Active Hospital Problems     Diagnosis      Acute acalculous cholecystitis [K81.0]         Priority: Medium          I  discussed the management of all of the identified problems with the APN or PA.        I formulated the treatment plan for all identified problems and discussed those with the APN or PA . This management plan was then carried out and the patient's orders for care by the APN or PA. Total time personally spent on this patient encounter was 55 minutes which includes :  Preparing to see the patient( reviewing tests and chart)  Obtaining and reviewing separately obtained history  Performing a medically appropriate examination and evaluation  Ordering medications, tests, or procedures  Counseling and educating the patient/family/caregiver  Care coordination  Referring and communicating with other healthcare professionals  Documenting clinical information in the EHR  Independent interpretation of results and communicating the results to patient and care team  This includes a direct physical exam as well as all the other encounter activities described above. Time may be discontiguous. Time does not include procedures. Please see our orders that were directed and approved by me if there are any new ones for the updated patient care plan. Above discussed and I agree with documentation and orders placed by Clark Bernard     See any additional comments if needed below for any other updated orders and plans. -- NPO. IV fluid hydration. IV antibiotics. MRCP. Possible GI service need pending MRI results. Pain and nausea control. SCDs for DVT prophylaxis. Risks, benefits and alternatives discussed in detail with patient. Robotic, laparoscopic and open techniques discussed. Patient in agreement and wishes to proceed with cholecystectomy. Await MRI results prior to surgery. Consent.      Electronically signed by Rodriguez Reece MD on 22 at 11:18 AM EST       Procedures/Diagnostic Tests:    [facility]  ERCP  Endoscopic Retrograde Cholangiopancreatography Procedure Report  Patient: Dunia Carr             : 1950                      Acct#: [de-identified]  PHYSICIAN:  Anita Lal MD     PREPROCEDURE DIAGNOSIS:  This is a 67 y.o. , female , with history of acute calculous cholecystitis with 4mm calculus within common hepatic duct. Medications:  See Anesthesia Records for details. Zosyn on floor IV preprocedure   Indocin 100mg suppository given pre procedure. DESCRIPTION OF PROCEDURE: The  risk of bleeding, trauma, infection, perforation, pancreatitis, death and medication-related cardiac and respiratory complications were discussed and written informed consent was obtained. The endoscope was passed with ease through the mouth under direct visualization; it was advanced to the 2nd portion of the duodenum. The scope was withdrawn and the mucosa was examined. See summary for detailed findings. Findings:  Normal, limited endoscopic exam from the esophagus to the second portion of the duodenum. Normal major papilla. Cannulation:  Biliary cannulation was performed using a sphinterotome with a 0.035 in x 250cm guidewire. Contrast injection revealed   (partial head of the pancreas pancreatogram which was normal),  The common bile duct measured 12 mm in diameter. Normal cholangiogram of the common hepatic duct,  right and left main and intrahepatics. Normal cystic duct with filling defect in the cystic duct. Next a biliary sphincterotomy was performed over the guidewire using standard settings in the 12 oclock position. Next,  balloon sweeps and balloon cholangiogram were obtained using a 12 mm extraction balloon yielding no sludge or stones. The stomach was decompressed. Complications: The patient tolerated the procedure well. There were no complications. EBL : < 10 mL        SUMMARY:   Otherwise normal limited endoscopic exam from the esophagus to the  second portion of the duodenum. Periampullary diverticulum major papilla  12 mm common bile duct.   Filling defect was found in the cystic duct, stricture NOT found        Successful  biliary sphincterotomy  Balloon cholangiogram and pull through yielding no stones/sludge. Stomach decompressed     RECOMMENDATIONS:    Begin clears today when awake, alert, and oriented and pain < 4/10. NPO for surgery , cholecystectomy tomorrow with Dr. Trisha Chan. Sacral decubitus ulcer noted, Dr. Trisha Chan informed. Avoid Aspirin, NSAIDs, Aspirin,  Heparin and plavix over the next 48 hours. Resume previous medications. If patient develops melena/dark stool in the next two weeks, report immediately to the ER and call the GI doctor on call at                       [facility]  Pt. To get cholecystectomy tomorrow if does not develop acute pancreatitis. GI team to see tomorrow morning. Specimens: were not obtained     (The following sections must be completed)  Post-Sedation Vital Signs: Vital signs were reviewed and were stable after the procedure (see flow sheet for vitals)            Post-Sedation Exam: Lungs: clear to auscultation bilaterally and Cardiovascular: regular rate and rhythm           Complications: none        Performed By:  aRdha Romero MD, [facility]          OPERATIVE REPORT     PATIENT NAME: Yennifer Solis                 :        1950  MED REC NO:   [id number]                           ROOM:       [id number]  ACCOUNT NO:   [de-identified]                           ADMIT DATE: 2022  PROVIDER:     Grace Brown M.D.     DATE OF PROCEDURE:  2022     PREOPERATIVE DIAGNOSIS:  Acute calculous cholecystitis. POSTOPERATIVE DIAGNOSIS:  Acute calculous cholecystitis. PROCEDURE:  Robotic cholecystectomy. SURGEON:  Grace Brown MD     ASSISTANT:  Jazmin Bae. Craig[facility]     ANESTHESIA:  General/local.     ESTIMATED BLOOD LOSS:  50 mL. DRAINS:  None. COMPLICATIONS:  None. DISPOSITION:  Stable to the recovery room. INDICATIONS:  The patient is a 80-year-old female who presented  secondary to acute calculous cholecystitis.   Concern for common hepatic  duct stone. Underwent ERCP. She is now in need of cholecystectomy. Both operative and nonoperative intervention plans were discussed. Risks of surgery were further discussed. Some of the risks included but  were not limited to bleeding, infection, the need for re-operation,  severe chronic postoperative pain or numbness, major vascular or nerve  injury, cardiopulmonary complications, anesthetic complications, seroma  or hematoma formation, wound breakdown, trocar site herniation, bile  leak, bile duct injury, biloma formation, chronic pain and death. After  all of the questions were answered in their entirety and the patient was  completely aware of current situation, she elected to proceed with the  procedure. DESCRIPTION OF THE PROCEDURE:  After informed consent was signed and  placed on the chart, the patient was taken back to the operating room  and placed supine on the operating room table. General anesthesia was  induced. She tolerated this well throughout the case. All pressure  points were padded. She was on preoperative antibiotics. Bilateral  lower extremity sequential compression devices were placed prior to  incision. Her abdomen and pelvis was prepped and draped in the usual  sterile standard fashion. A time-out occurred prior to the operation  which not only identified the patient but also the planned procedure to  be performed. At the end of the time-out, there were no questions or  concerns. I began the operation by making a small transverse incision  above the umbilicus. Fascia was elevated and the Veress needle was  inserted. Intra-abdominal cavity was insufflated to a pressure of  approximately 15 mmHg of carbon dioxide gas. The patient tolerated  insufflation well. A 12-mm trocar was placed. Laparoscope was  inserted.   Upon initial evaluation, there was no hollow viscus sign or  major vascular injury with the Veress needle insertion or the first  trocar placement. Two other 8-mm trocars were placed in a standard  location under direct vision. A 5-mm assistant was placed in the right  upper lateral quadrant. Gallbladder appeared to be acutely inflamed and  really stuck into the omentum. She was placed in reverse Trendelenburg  with right side elevated. Robot was brought in and docked. Instruments  were placed under direct vision. Once everything was aligned and in  order, I then unscrubbed and back to the console. I began the operation  by tediously taken off the omentum off the anterior wall of the  gallbladder. Gallbladder was densely acutely inflamed, very thickened  and edematous, very friable. Once the anterior wall was dissected off,  I was able to grasp the fundus and elevate this up over the dome of  liver. Infundibulum was then able to be grasped, retracted down the  right lower quadrant. Over time dissection off the acutely inflamed  friable tissues off the infundibulum, I was able to identify and it was  felt to be the cystic artery and duct. This was confirmed not only  clinically with a critical view but also with Firefly. At that time,  they were doubly clipped and divided close to the gallbladder. Gallbladder was then dissected off the liver bed using electrocautery. Hemostasis was obtained with electrocautery. Irrigation. Irrigant turn  was clear. Clips appeared to be intact. No oozing of bile or blood  from the cystic duct and artery stumps nor from the liver bed. Instruments were removed. Robot was undocked. I scrubbed back into the  table. Large gallbladder was then placed into an endoscopic retrieval  bag and brought out through large trocar site and sent to Pathology for  permanent. One last check demonstrated adequate hemostasis but due to  the friability and acute inflamed tissues, I did place a Surgicel powder  to ensure hemostasis. Trocars were removed. The patient tolerated  desufflation well.   Large trocar site was closed at the fascia level  with 0 Vicryl suture on a UR needle x2. At the completion of this,  there were no fascial defects. Subcutaneous tissues were irrigated. Hemostasis was adequate. Skin was reapproximated at all the incisional  sites with 4-0 Vicryl in a subcuticular fashion. Closed incisions were  then clean, dry and Steri-Strips were applied. Dry sterile dressings  were applied. Sponge, needle and instrumentation count was correct at  the end of the procedure. The patient tolerated the procedure well with  no apparent complications and only about 50 mL of blood loss. She was  able to be brought out of general anesthesia and transferred to  postanesthesia care unit stable condition. Grace Vivas M.D.     D: 12/28/2022 15:09:38       T: 12/28/2022 15:11:47     [email or web address]  Job#: [id number]     Doc#: [id number]    Hospital Course: Alaina Flores is a 70-year old female patient who presents for RUQ abdominal pain and nausea. Recent left hip arthoplasty on 12/12. CT on admission demonstrated acute cholecystitis and WBC was 30 on arrival. CT findings prompted MRCP which demonstrated possible filing CBD defect and hepatic duct stone. She was admitted to [facility] and GI was consulted. She had ERCP that afternoon with no findings of stones but did have sphincterotomy. WBC was trending down and then she was taken to the operative suite per Sandra Ornelas MD and the planned procedure was performed as noted above. Postoperative she was managed with IV fluid hydration, stool softeners, GI and DVT prophylaxis. Her blood culture did come back positive POD #1 and ID was consulted. She was switch from Zosyn to Rocephin and than transitioned over to oral Cipro when sensitivity came back. Over the hospital stay she improved in ability to tolerate increasing levels of activity, take po fluids and solid foods with evidence of returning bowel function, and able to void on her own accord.  Pain controlled with pain medication. Discharge Condition: stable    Disposition: home    Labs:  Lab Results   Component Value Date    WBC 10.4 12/30/2022    HGB 9.7 (L) 12/30/2022    HCT 28.4 (L) 12/30/2022    MCV 85.5 12/30/2022     12/30/2022     Lab Results   Component Value Date/Time     12/29/2022 06:24 AM    K 4.1 12/29/2022 06:24 AM     12/29/2022 06:24 AM    CO2 18 12/29/2022 06:24 AM    BUN 14 12/29/2022 06:24 AM    CREATININE 0.4 12/29/2022 06:24 AM    GLUCOSE 138 12/29/2022 06:24 AM    GLUCOSE 92 07/15/2011 03:09 PM    CALCIUM 7.1 12/29/2022 06:24 AM      Contains abnormal data Culture, Blood 2  Order: [id number]  Status: Final result    Visible to patient: No (not released)    Next appt: 01/09/2023 at 10:15 AM in [address] [place] [address] Emigdio Torres MD)    Specimen Information: Blood   0 Result Notes  Component 12/27/22 0535    Blood Culture, Routine No growth 24 hours. Current antibiotic therapy ineffective in vitro for isolate. Abnormal     Organism Klebsiella oxytoca Abnormal     Resulting Agency [address] [facility]        Susceptibility    Klebsiella oxytoca (1)    Antibiotic Interpretation Microscan  Method Status    piperacillin-tazobactam Resistant >=128 mcg/mL BACTERIAL SUSCEPTIBILITY PANEL BY PEPE Final    cefTRIAXone Resistant 16 mcg/mL BACTERIAL SUSCEPTIBILITY PANEL BY PEPE Final    ampicillin Resistant >=32 mcg/mL BACTERIAL SUSCEPTIBILITY PANEL BY PEPE Final    cefepime Sensitive <=1 mcg/mL BACTERIAL SUSCEPTIBILITY PANEL BY PEPE Final    trimethoprim-sulfamethoxazole Sensitive <=20 mcg/mL BACTERIAL SUSCEPTIBILITY PANEL BY PEPE Final    gentamicin Sensitive <=1 mcg/mL BACTERIAL SUSCEPTIBILITY PANEL BY PEPE Final    ciprofloxacin Sensitive <=0.25 mcg/mL BACTERIAL SUSCEPTIBILITY PANEL BY PEPE                  Discharge Instructions:  Pt Name: Mellisa Seals  Medical Record Number: [id number]  Today's Date: 12/30/2022    GENERAL ANESTHESIA OR SEDATION  1.  Do not drive or operate hazardous machinery for 24 hours. 2. Do not make important business or personal decisions for 24 hours. 3. Do not drink alcoholic beverages or use tobacco for 24 hours. ACTIVITY INSTRUCTIONS:  [] Rest today. Resume light to normal activity tomorrow.   [] You may resume normal activity tomorrow. Do not engage in strenuous activity that may place stress on your incision. [x] Do not drive for 3-5 days or while taking the pain medication. Avoid heavy lifting, tugging, pullings greater than 10 lbs until seen in the office. DIET INSTRUCTIONS:  [x]Regular diet as tolerated. MEDICATIONS  [x]Prescription sent with you to be used as directed. []Lortab   [x]Norco   []Percocet   []Tylenol #3   []Oxycontin  Do not drink alcohol or drive while taking these medications. You may experience dizziness or drowsiness with these medications. You may also experience constipation which can be relieved with stool softners or laxatives. **Pain medication at discharge - use only as prescribed- refills may be available to you at your follow up appointments if needed and warranted. Narcotics should be used for only short term and we highly encouraged our patients to wean off appropriately and use other means for pain such as non pharmacologic measures and over the counter tylenol or ibuprofen if no restrictions apply. We do  know that surgical pain is real and will not hesitate to help eliminate some of your discomfort. However we will not be able to completely make you pain free and it is important to determine what pain level is tolerable for you **  [x]You may resume your daily prescription medication schedule unless otherwise specified. HOLD NORVASC IF SYSTOLIC BP < 819. [x]Resume 325mg Aspirin      Take the Zofran exactly as prescribed to control nausea and vomiting. Use Colace and MiraLAX asneeded to prevent constipation. Do not allow yourself to become constipated.     Drink at least 64 ounces of liquids per day.    WOUND/DRESSING INSTRUCTIONS:  Always ensure you and your care giver clean hands before and after caring for the wound. [] Keep dressing clean and dry for 48 hours. Change when soiled or wet. [x] Allow steri-strips to fall off on their own. [x] Ice operative site for 20 minutes 4 times a day as needed. [x] May wash over incision in shower, but do not soak in a bath.  [] Take sitz bath for 20 minutes twice daily and after bowel movements. [x] Keep the abdominal binder in place during the day. May remove to shower and at night. ABDOMINAL/LAPAROSCOPIC SURGERY  [x]You are encouraged to get up and move around as this helps with circulation, prevents blood clots from forming and speeds up the healing process. Call the office if you develop pain or swelling in your legs. Do not massage sore muscles in the legs. [x]Breath deeply and cough from time to time. This helps to clear your lungs and helps prevent pneumonia. [x]Supporting your incision with a pillow or your hand helps to minimize discomfort and pain. [x]Laparoscopic patients may develop shoulder pain in the first 48 hours from the gas used during the procedure a heating pad may help alleviate this discomfort. FOLLOW-UP CARE. SPECIFICALLY WATCH FOR:   Fever over 101 degrees by mouth   Increased redness, warmth, hardness at operative site. Blood soaked dressing (small amounts of oozing may be normal.)   Increased or progressive drainage from the surgical area   Inability to urinate or blood in the urine   Pain not relieved by the medications ordered   Persistent nausea and/or vomiting, unable to retain fluids. Pain or swelling in your legs. Shortness of breath. Call the office if you develop any of the above symptoms. FOLLOW-UP APPOINTMENT   []1 week   [x]2 weeks:    []Other    Call my office if you have any problem that concerns you [phone]. After hours, you can reach the answering service via the office phone number.  IF YOU NEED IMMEDIATE ATTENTION, GO TO THE EMERGENCY ROOM AND YOUR DOCTOR WILL BE CONTACTED. Prepared by Richard Escalante CNP for   Chente Garcia MD [facility]  [address] [facility] [address]     If you develop black/dark stool in the next two weeks, report immediately to the ER and call the GI doctor on call at [facility]  Discharge Medications:        Medication List        START taking these medications      ciprofloxacin 500 MG tablet  Commonly known as: Cipro  Take 1.5 tablets by mouth 2 times daily for 7 days     HYDROcodone-acetaminophen 5-325 MG per tablet  Commonly known as: NORCO  Take 1 tablet by mouth every 6 hours as needed for Pain for up to 7 days.  Max Daily Amount: 4 tablets     ondansetron 4 MG disintegrating tablet  Commonly known as: ZOFRAN-ODT  Take 1 tablet by mouth every 8 hours as needed for Nausea or Vomiting            CHANGE how you take these medications      amLODIPine 2.5 MG tablet  Commonly known as: NORVASC  Take 1 tablet by mouth daily HOLD MEDICATION IF SYSTOLIC BP (TOP NUMBER) < 120  What changed: additional instructions            CONTINUE taking these medications      acetaminophen 650 MG extended release tablet  Commonly known as: TYLENOL     ALPRAZolam 0.25 MG tablet  Commonly known as: XANAX     aspirin 325 MG tablet     cyclobenzaprine 5 MG tablet  Commonly known as: FLEXERIL     diphenhydrAMINE 25 MG tablet  Commonly known as: BENADRYL     docusate sodium 100 MG capsule  Commonly known as: COLACE     famotidine 20 MG tablet  Commonly known as: PEPCID     omeprazole 20 MG delayed release capsule  Commonly known as: PRILOSEC     therapeutic multivitamin-minerals tablet     vitamin D 25 MCG (1000 UT) Caps            STOP taking these medications      traMADol 50 MG tablet  Commonly known as: ULTRAM               Where to Get Your Medications        These medications were sent to [address] [place] [address] [id number] - [address] [address] [address] Hayden Palacio [facility] [address]      Phone: [phone]   ciprofloxacin 500 MG tablet  HYDROcodone-acetaminophen 5-325 MG per tablet  ondansetron 4 MG disintegrating tablet       Information about where to get these medications is not yet available    Ask your nurse or doctor about these medications  amLODIPine 2.5 MG tablet         Diet: General/Regular diet as tolerated    Activity: no lifting more than 10-20 lbs for next two weeks    Wound Care: as directed     Follow-up:  in the next few weeks with Rex Templeton MD  Follow up with STACY Levy CNP in 1-2 weeks.     Elizebeth Began, APRN - CNP, CNP   Electronincally signed 12/31/2022 at 5:06 AM    A total of 35 minutes was spent in preparing the patient for discharge with greater than 50% of the time involved with education, counseling and coordinating care

## 2023-01-01 LAB — BLOOD CULTURE, ROUTINE: NORMAL

## 2023-01-03 ENCOUNTER — CARE COORDINATION (OUTPATIENT)
Dept: CASE MANAGEMENT | Age: 73
End: 2023-01-03

## 2023-01-03 DIAGNOSIS — K81.0 ACUTE CHOLECYSTITIS: Primary | ICD-10-CM

## 2023-01-03 PROCEDURE — 1111F DSCHRG MED/CURRENT MED MERGE: CPT | Performed by: FAMILY MEDICINE

## 2023-01-03 NOTE — CARE COORDINATION
[facility] Care Transitions Initial Follow Up Call    Call within 2 business days of discharge: Yes    Care Transition Nurse contacted the patient by telephone to perform post hospital discharge assessment. Verified name and  with patient as identifiers. Provided introduction to self, and explanation of the Care Transition Nurse role. Patient: Carmen Bentley Patient : 1950   MRN: [id number]  Reason for Admission: RUQ pain/ cholecystectomy  Discharge Date: 22 RARS: Readmission Risk Score: 14.7      Last Discharge  [facility]       Date Complaint Diagnosis Description Type Department Provider    22 Abdominal Pain; Emesis; Shortness of Breath Right upper quadrant abdominal pain . .. ED to Hosp-Admission (Discharged) (ADMITTED) Keo Guerrero MD            Was this an external facility discharge? No Discharge Facility:     Challenges to be reviewed by the provider   Additional needs identified to be addressed with provider: No  none               Method of communication with provider: none. CTN call to Singh [place] today and she says she is doing pretty good. C/o R sided post op pain w/ activity 5/10, taking Norco every 6h w/ relief. Using ice on area as well. Denies fever, chills, sob, n/v/d, dizziness, dysuria, polyuria, henaturia. Surgical sites ok. Meds reviewed and are correct. Reviewed post op instructions pgs. 12-13 AVS-no questions. CM Comm HH scheduled to see her today at noon. She will have them look at pressure ulcer on sacrum. Reviewed upcoming HFU:  23 Ortho  23 PCP  23 Surgeon  Her daughter takes her to her appts. XIN hose on at all times per ortho unless showering. EAting, drinking & sleeping well. Denies problems w/ urination/bowels. Last BM yesterday-small. Will take Glycolax today if feeling constipated. Using IS a few times a day for lung exercises. FA=023/88  No other concerns voiced at this time. CTN # given to pt.  Radha Zepeda, BSN Care Transitions [phone]          Care Transition Nurse reviewed discharge instructions, medical action plan, and red flags with patient who verbalized understanding. The patient was given an opportunity to ask questions and does not have any further questions or concerns at this time. Were discharge instructions available to patient? Yes. Reviewed appropriate site of care based on symptoms and resources available to patient including: PCP  Specialist  Home health  When to call 911. The patient agrees to contact the PCP office for questions related to their healthcare. Advance Care Planning:   Does patient have an Advance Directive: not on file. Medication reconciliation was performed with patient, who verbalizes understanding of administration of home medications.  Medications reviewed, 1111F entered: yes    Was patient discharged with a pulse oximeter? no    Non-face-to-face services provided:  Scheduled appointment with PCP-1/9/23  Scheduled appointment with Specialist-2/4/23 ortho; 1/11/23 surgeon  Obtained and reviewed discharge summary and/or continuity of care documents    Offered patient enrollment in the Remote Patient Monitoring (RPM) program for in-home monitoring: Patient is not eligible for RPM program.    Care Transitions 24 Hour Call    Schedule Follow Up Appointment with PCP: Completed  Do you have a copy of your discharge instructions?: Yes  Do you have all of your prescriptions and are they filled?: Yes  Have you been contacted by a [facility] Pharmacist?: No  Have you scheduled your follow up appointment?: Yes  How are you going to get to your appointment?: Car - family or friend to transport  [address]: [address] (Comment: [address])  Patient DME: Aliya Meyers chair, Other  Do you have support at home?: Alone  Do you feel like you have everything you need to keep you well at home?: Yes  Are you an active caregiver in your home?: No  Care Transitions Interventions   Home Care Waiver: Completed Physical Therapy: Completed       Transportation Support: Declined      DME Assistance: Declined   Disease Specific Clinic: Completed Occupational Therapy: Completed     Disease Association: Completed               Follow Up  Future Appointments   Date Time Provider Jim Terra   1/9/2023 10:15 AM MD STANLEY Bates [facility]   1/11/2023  3:00 PM More Vicente, APRN - [address] Transition Nurse provided contact information. Plan for follow-up call in 5-7 days based on severity of symptoms and risk factors. Plan for next call: symptom management-incisions, post op pain, sob?, fever, chills, n/v/d? Appetite, BM  follow-up appointment-1/4/23 ortho; 1/9/23 PCP-> review  medication management-pain med -how often?     Danny Orellana RN

## 2023-01-09 ENCOUNTER — CARE COORDINATION (OUTPATIENT)
Dept: CASE MANAGEMENT | Age: 73
End: 2023-01-09

## 2023-01-10 ENCOUNTER — OFFICE VISIT (OUTPATIENT)
Dept: SURGERY | Age: 73
End: 2023-01-10

## 2023-01-10 VITALS
SYSTOLIC BLOOD PRESSURE: 120 MMHG | BODY MASS INDEX: 27.71 KG/M2 | HEART RATE: 72 BPM | RESPIRATION RATE: 18 BRPM | DIASTOLIC BLOOD PRESSURE: 60 MMHG | OXYGEN SATURATION: 98 % | WEIGHT: 150.6 LBS | TEMPERATURE: 97.2 F | HEIGHT: 62 IN

## 2023-01-10 DIAGNOSIS — Z90.49 S/P LAPAROSCOPIC CHOLECYSTECTOMY: ICD-10-CM

## 2023-01-10 PROBLEM — R10.11 RIGHT UPPER QUADRANT ABDOMINAL PAIN: Status: RESOLVED | Noted: 2022-12-27 | Resolved: 2023-01-10

## 2023-01-10 PROBLEM — K81.0 ACUTE CHOLECYSTITIS: Status: RESOLVED | Noted: 2022-12-27 | Resolved: 2023-01-10

## 2023-01-10 PROCEDURE — 99024 POSTOP FOLLOW-UP VISIT: CPT | Performed by: NURSE PRACTITIONER

## 2023-01-10 ASSESSMENT — ENCOUNTER SYMPTOMS
PHOTOPHOBIA: 0
SHORTNESS OF BREATH: 0
ABDOMINAL DISTENTION: 0
ALLERGIC/IMMUNOLOGIC NEGATIVE: 1
RHINORRHEA: 0
WHEEZING: 0
CONSTIPATION: 0
COUGH: 0
EYE DISCHARGE: 0
EYE ITCHING: 0
BLOOD IN STOOL: 0
APNEA: 0
SORE THROAT: 0
CHEST TIGHTNESS: 0
VOMITING: 0
ANAL BLEEDING: 0
CHOKING: 0
ABDOMINAL PAIN: 1
EYE PAIN: 0
SINUS PRESSURE: 0
BACK PAIN: 0
NAUSEA: 0
COLOR CHANGE: 0
DIARRHEA: 0

## 2023-01-10 NOTE — PROGRESS NOTES
[address] [place] [address] Dr Eldon Mchugh [facility] [id number]  Dept: [phone]  Dept Fax: [phone]  Loc: [phone]    Visit Date: 1/10/2023    Tierney Jacobs is a 67 y.o. female who presents today for:  Chief Complaint   Patient presents with    Post-Op Check     s/p Robotic cholecystectomy-12/28/22          HPI:     HPI    Pamela Mireles is a 70-year old female patient who presents for follow up status post robotic cholecystectomy almost 2 weeks ago with Dr. Ash Arcos. Patient had bacteremia secondary to acute cholecystitis and UTI. She was treated per infectious disease with Cipro. She completed antibiotics on 1/6/23. Presents with daughter. States she is doing well. Fatigue is improving. Appetite is slowly improving. No nausea or vomiting. Some diarrhea with certain foods. No constipation. Abdominal incisions are healing well. No signs of infection. Minimal incisional pain. Taking tylenol as needed. Off narcotics. No urinary complaints. No chest pain or SOB. Recent left hip arthoplasty and that incision is healing well. Followed up with [facility] last week.      Past Medical History:   Diagnosis Date    Acute cholecystitis 12/27/2022    Anxiety     Arthritis     Cancer ([facility] [address] 1978    CERVICAL CANCER    COPD (chronic obstructive pulmonary disease) ([facility] [address] 05/24/2021    Endometrial cancer (HCC)     cervical CA at age 32    Female bladder prolapse     with rectocele    Hypertension     Right upper quadrant abdominal pain 12/27/2022      Past Surgical History:   Procedure Laterality Date    BACK SURGERY  1976    CHOLECYSTECTOMY, LAPAROSCOPIC N/A 12/28/2022    ROBOTIC CHOLECYSTECTOMY performed by Adrián Walker MD at [address]      ERCP N/A 12/27/2022    ERCP performed by Mulugeta Brown MD at [address] Left 05/18/2017    Lt total shoulder , Dr Graf Maurice LUMBAR FUSION N/A 8/12/2022    L1-5 Decompression L1-5 Posterior Fusion performed by Jaden Koehler MD at [address] Right 02/2018       Family History   Problem Relation Age of Onset    COPD Mother     Emphysema Mother     Other Father         diverticulitis    Breast Cancer Maternal Aunt 46    Breast Cancer Maternal Aunt 62       Social History     Tobacco Use    Smoking status: Former     Packs/day: 0.50     Years: 44.00     Pack years: 22.00     Types: Cigarettes     Start date: 1/1/1970     Quit date: 2008     Years since quitting: 15.0    Smokeless tobacco: Never   Substance Use Topics    Alcohol use: Yes     Alcohol/week: 4.0 standard drinks     Types: 2 Glasses of wine, 2 Shots of liquor per week     Comment: A WEEK, GLASS OF WINE on saturday      Current Outpatient Medications   Medication Sig Dispense Refill    ondansetron (ZOFRAN-ODT) 4 MG disintegrating tablet Take 1 tablet by mouth every 8 hours as needed for Nausea or Vomiting 10 tablet 0    amLODIPine (NORVASC) 2.5 MG tablet Take 1 tablet by mouth daily HOLD MEDICATION IF SYSTOLIC BP (TOP NUMBER) < 120 30 tablet 11    aspirin 325 MG tablet Take 325 mg by mouth daily      omeprazole (PRILOSEC) 20 MG delayed release capsule Take 20 mg by mouth daily      cyclobenzaprine (FLEXERIL) 5 MG tablet Take 5 mg by mouth 3 times daily as needed for Muscle spasms      acetaminophen (TYLENOL) 650 MG extended release tablet Take 650 mg by mouth every 8 hours as needed for Pain      vitamin D 25 MCG (1000 UT) CAPS Take by mouth      famotidine (PEPCID) 20 MG tablet Take 20 mg by mouth daily      diphenhydrAMINE (BENADRYL) 25 MG tablet Take 25 mg by mouth every 6 hours as needed for Itching or Allergies      docusate sodium (COLACE) 100 MG capsule Take 100 mg by mouth daily      Multiple Vitamins-Minerals (THERAPEUTIC MULTIVITAMIN-MINERALS) tablet Take 1 tablet by mouth daily.       ALPRAZolam (XANAX) 0.25 MG tablet Take 1 tablet by mouth 2 times daily as needed for Anxiety for up to 30 days. Max Daily Amount: 0.5 mg 60 tablet 5     No current facility-administered medications for this visit.     Allergies   Allergen Reactions    Codeine Nausea And Vomiting       Subjective:     Review of Systems   Constitutional:  Positive for activity change. Negative for appetite change, chills, diaphoresis, fatigue, fever and unexpected weight change.   HENT:  Negative for congestion, dental problem, hearing loss, rhinorrhea, sinus pressure and sore throat.    Eyes:  Negative for photophobia, pain, discharge, itching and visual disturbance.   Respiratory:  Negative for apnea, cough, choking, chest tightness, shortness of breath and wheezing.    Cardiovascular:  Negative for chest pain, palpitations and leg swelling.   Gastrointestinal:  Positive for abdominal pain (minimal). Negative for abdominal distention, anal bleeding, blood in stool, constipation, diarrhea, nausea and vomiting.   Endocrine: Negative.    Genitourinary:  Negative for decreased urine volume, difficulty urinating, dysuria, frequency and urgency.   Musculoskeletal:  Positive for gait problem (weakness - uses walker) and myalgias. Negative for arthralgias, back pain, joint swelling and neck pain.   Skin:  Negative for color change, pallor, rash and wound.   Allergic/Immunologic: Negative.    Neurological:  Negative for dizziness, tremors, weakness, numbness and headaches.   Hematological: Negative.    Psychiatric/Behavioral: Negative.       Objective:   /60 (Site: Right Upper Arm, Position: Sitting, Cuff Size: Medium Adult)   Pulse 72   Temp 97.2 °F (36.2 °C) (Temporal)   Resp 18   Ht 5' 2\" (1.575 m)   Wt 150 lb 9.6 oz (68.3 kg)   SpO2 98%   BMI 27.55 kg/m²     Physical Exam  Vitals reviewed.   Constitutional:       General: She is not in acute distress.     Appearance: Normal appearance. She is well-developed. She is not ill-appearing or toxic-appearing.   HENT:      Head:  Normocephalic and atraumatic. Right Ear: Hearing and external ear normal.      Left Ear: Hearing and external ear normal.      Nose: Nose normal.      Mouth/Throat:      Mouth: Mucous membranes are not pale, not dry and not cyanotic. Eyes:      General: Lids are normal.   Neck:      Trachea: Trachea and phonation normal.   Cardiovascular:      Rate and Rhythm: Normal rate and regular rhythm. Pulses: Normal pulses. Heart sounds: S1 normal and S2 normal.   Pulmonary:      Effort: Pulmonary effort is normal. No tachypnea, bradypnea, accessory muscle usage or respiratory distress. Breath sounds: Normal breath sounds. No decreased breath sounds, wheezing or rales. Chest:      Chest wall: No tenderness. Abdominal:      General: Bowel sounds are normal. There is no distension. Palpations: Abdomen is soft. There is no mass. Tenderness: There is no abdominal tenderness. Musculoskeletal:         General: No tenderness. Normal range of motion. Cervical back: Normal range of motion and neck supple. Skin:     General: Skin is warm and dry. Findings: No abrasion, bruising, burn, ecchymosis, erythema, laceration, lesion or rash. Neurological:      Mental Status: She is alert and oriented to person, place, and time. Motor: No tremor, atrophy or abnormal muscle tone. Coordination: Coordination normal.      Gait: Gait normal.      Deep Tendon Reflexes: Reflexes are normal and symmetric. Psychiatric:         Speech: Speech normal.         Behavior: Behavior normal.         Thought Content:  Thought content normal.       Lab Results   Component Value Date    WBC 10.4 12/30/2022    HGB 9.7 (L) 12/30/2022    HCT 28.4 (L) 12/30/2022    MCV 85.5 12/30/2022     12/30/2022     Lab Results   Component Value Date/Time     12/29/2022 06:24 AM    K 4.1 12/29/2022 06:24 AM     12/29/2022 06:24 AM    CO2 18 12/29/2022 06:24 AM    BUN 14 12/29/2022 06:24 AM CREATININE 0.4 12/29/2022 06:24 AM    GLUCOSE 138 12/29/2022 06:24 AM    GLUCOSE 92 07/15/2011 03:09 PM    CALCIUM 7.1 12/29/2022 06:24 AM      Culture, Blood 2  Order: [id number]  Status: Final result    Visible to patient: No (not released)    Next appt: 01/23/2023 at 02:30 PM in General Surgery ([facility], APRN - CNP)    Specimen Information: Blood   0 Result Notes  Component 12/27/22 0535    Blood Culture, Routine No growth 24 hours. Current antibiotic therapy ineffective in vitro for isolate. Abnormal     Organism Klebsiella oxytoca Abnormal     Resulting Agency [address] [facility]        Susceptibility    Klebsiella oxytoca (1)    Antibiotic Interpretation Microscan  Method Status    piperacillin-tazobactam Resistant >=128 mcg/mL BACTERIAL SUSCEPTIBILITY PANEL BY PEPE Final    cefTRIAXone Resistant 16 mcg/mL BACTERIAL SUSCEPTIBILITY PANEL BY PEPE Final    ampicillin Resistant >=32 mcg/mL BACTERIAL SUSCEPTIBILITY PANEL BY PEPE Final    cefepime Sensitive <=1 mcg/mL BACTERIAL SUSCEPTIBILITY PANEL BY PEPE Final    trimethoprim-sulfamethoxazole Sensitive <=20 mcg/mL BACTERIAL SUSCEPTIBILITY PANEL BY PEPE Final    gentamicin Sensitive <=1 mcg/mL BACTERIAL SUSCEPTIBILITY PANEL BY PEPE Final    ciprofloxacin Sensitive <=0.25 mcg/mL BACTERIAL SUSCEPTIBILITY PANEL BY PEPE Final               PATHOLOGY REPORT     Clinical Information: GENERALIZED ABDOMINAL PAIN     FINAL DIAGNOSIS:   Gallbladder, cholecystectomy:             Cholelithiasis and acute cholecystitis. Specimen:   GALLBLADDER       Gross Examination:   The container is labeled [employer], gallbladder. Received in   formalin is an enlarged gallbladder measuring 12 cm in length x 4.5 cm   in diameter. The surface is red-tan and shaggy. There is a   surgically-induced defect in the wall. There is a single 1.5 cm   gallstone. The wall measures 0.5 cm in average thickness and the   mucosal surface is red-tan with fibrinous debris.   The cystic duct is   patent. Representative sections including the cystic duct margin are   submitted. 1 ss. SMW/DKR:[id number]     Microscopic Examination:   Microscopic examination was performed. <Sign Out Dr. José Miguel Correa M.D., F.C.A.P. Patient Active Problem List   Diagnosis    COPD (chronic obstructive pulmonary disease) ([facility] [address]    Osteoarthritis of multiple joints    Hypertension    Lumbar stenosis with neurogenic claudication    BRYCE (generalized anxiety disorder)    S/P laparoscopic cholecystectomy     Assessment:     Status post robotic cholecystectomy   Bacteremia - resolved   UTI - resolved  Recent left hip arthoplasty 12/12/22    Plan:     Abdomen benign. Incisions are healing well without signs of infection. Continue wound care as directed. Pathology reviewed and discussed  Completed oral antibiotics  Appetite doing well. Continue diet as tolerated. High protein supplements encouraged. Continue to monitor diarrhea. Imodium as needed. Off narcotics. Tylenol as needed for discomfort. Lifting/activity restrictions discussed with patient. Questions answered. Follow up in 2 weeks. Signs and symptoms reviewed with patient that would be concerning and need her to return to office for re-evaluation. Patient states she will call if she has questions or concerns.   Follow up with PCP and ortho as directed      Electronically signed by STACY Duncan CNP on 1/10/2023 at 3:27 PM

## 2023-01-17 ENCOUNTER — CARE COORDINATION (OUTPATIENT)
Dept: CASE MANAGEMENT | Age: 73
End: 2023-01-17

## 2023-01-17 NOTE — CARE COORDINATION
[facility] Care Transitions Follow Up Call    Care Transition Nurse contacted the patient by telephone to follow up after admission on 22. Verified name and  with patient as identifiers. Patient: Tamara Mcneal  Patient : 1950   MRN: [id number]  Reason for Admission: s/p lar phyllis; UTI  Discharge Date: 22 RARS: Readmission Risk Score: 14.7      Needs to be reviewed by the provider   Additional needs identified to be addressed with provider: No  none             Method of communication with provider: none. CTN chuy to [facility] today and she says she is feeling pretty good. C/o some soreness at the incision site when touched. Overall pain 0/10. Will see surgeon 23. Ambulating around the house well w/ XIN hose on. L hip doing good. PM=390/78  CM Comm [facility] d/c'd pt. Last week. Pressure sore on sacrum healed. Eating, drinking & sleeping well. Denies n/v/d, fever, chills, chest/abd pain, dizziness, swelling, sob. Urinating normally, says diarrhea is gone, did not have to take Imodium. This writer wished her a happy birthday tomorrow. No other concerns voiced at this time. Addressed changes since last contact:  home health care-d/c'd last week  Discussed follow-up appointments. If no appointment was previously scheduled, appointment scheduling offered: Yes. Is follow up appointment scheduled within 7 days of discharge? No.    Follow Up  Future Appointments   Date Time Provider Jim Ellison   2023  2:30 PM STACY Rueda  [address] [facility] follow up appointment(s):     Care Transition Nurse reviewed discharge instructions, medical action plan, and red flags with patient and discussed any barriers to care and/or understanding of plan of care after discharge. Discussed appropriate site of care based on symptoms and resources available to patient including: PCP  Specialist  Home health  When to call 911.  The patient agrees to contact the PCP office for questions related to their healthcare. Advance Care Planning:   not on file. Patients top risk factors for readmission: lack of knowledge about disease, medical condition-s/p lap camden, L hip replacement 12/12/22, UTI, sepsis, COPD, HTN, OA, and polypharmacy  Interventions to address risk factors: Scheduled appointment with Specialist-1/23/23 surgeon    Offered patient enrollment in the Remote Patient Monitoring (RPM) program for in-home monitoring: Patient is not eligible for RPM program.     Care Transitions Subsequent and Final Call    Schedule Follow Up Appointment with PCP: Completed  Subsequent and Final Calls  Do you have any ongoing symptoms?: No  Have your medications changed?: No  Do you have any questions related to your medications?: No  Do you currently have any active services?: Yes  Are you currently active with any services?: Home Health  Do you have any needs or concerns that I can assist you with?: No  Identified Barriers: Lack of Education  Care Transitions Interventions   Home Care Waiver: Completed Physical Therapy: Completed       Transportation Support: Declined      DME Assistance: Declined   Disease Specific Clinic: Completed Occupational Therapy: Completed     Disease Association: Completed      Other Interventions:             Care Transition Nurse provided contact information for future needs. Plan for follow-up call in 7-10 days based on severity of symptoms and risk factors. Plan for next call: symptom management-n/v/d?, surgical site ?, appetite OK,BP  follow-up appointment-surgeon 1/23/23?     Mary Beth Burns RN

## 2023-01-24 ENCOUNTER — CARE COORDINATION (OUTPATIENT)
Dept: CASE MANAGEMENT | Age: 73
End: 2023-01-24

## 2023-01-24 NOTE — CARE COORDINATION
[facility] Care Transitions Follow Up Call:Final Call    Care Transition Nurse contacted the patient by telephone to follow up after admission on 22. Verified name and  with patient as identifiers. Patient: Rena Kessler  Patient : 1950   MRN: [id number]  Reason for Admission: RUQ pain; s/p lap camden  Discharge Date: 22 RARS: Readmission Risk Score: 14.7      Needs to be reviewed by the provider   Additional needs identified to be addressed with provider: No  none             Method of communication with provider: none. CTN call to [facility] Romero today and she says she is feeling pretty good. She cancelled her 2 week f/u w/ surgeon yesterday as everything is healed/ c/o 2/10 incisional pain w/ a lot of activity but not taking pain med. Denies fever, chills, sob, abd/chest pain, swelling, dizziness, n/v/d. Getting around very well, XIN hose on during the day off at night. Ortho f/u 23, has transportation. Using IS several times a day. Eating, drinking & sleeping well. BP =120-130/70's  Denies problems w/ urination/bowels. No other concerns voiced at this time. CTN informed her of final call. She expressed appreciation for the care. Addressed changes since last contact:  none  Discussed follow-up appointments. If no appointment was previously scheduled, appointment scheduling offered: Yes. Is follow up appointment scheduled within 7 days of discharge? No.    Follow Up  No future appointments. Non-[facility] follow up appointment(s):     Care Transition Nurse reviewed discharge instructions, medical action plan, and red flags with patient and discussed any barriers to care and/or understanding of plan of care after discharge. Discussed appropriate site of care based on symptoms and resources available to patient including: PCP  Specialist  Home health  When to call 911. The patient agrees to contact the PCP office for questions related to their healthcare.      Advance Care Planning: not on file and patient declined education. Patients top risk factors for readmission: lack of knowledge about disease, medical condition-s/p lap camden, L hip replacement 12/12/22, UTI, COPD, HTN, OA, and polypharmacy  Interventions to address risk factors: Scheduled appointment with Specialist-2/2/23 ortho    Offered patient enrollment in the Remote Patient Monitoring (RPM) program for in-home monitoring: Patient is not eligible for RPM program.     Care Transitions Subsequent and Final Call    Schedule Follow Up Appointment with PCP: Completed  Subsequent and Final Calls  Do you have any ongoing symptoms?: No  Have your medications changed?: No  Do you have any questions related to your medications?: No  Do you currently have any active services?: Yes  Are you currently active with any services?: Home Health  Do you have any needs or concerns that I can assist you with?: No  Identified Barriers: Lack of Education  Care Transitions Interventions   Home Care Waiver: Completed Physical Therapy: Completed       Transportation Support: Declined      DME Assistance: Declined   Disease Specific Clinic: Completed Occupational Therapy: Completed     Disease Association: Completed      Other Interventions:             Care Transition Nurse provided contact information for future needs. No further follow-up call indicated based on severity of symptoms and risk factors.   Plan for next call:  none    Yulia Romero RN

## 2023-06-21 ENCOUNTER — HOSPITAL ENCOUNTER (OUTPATIENT)
Dept: MAMMOGRAPHY | Age: 73
Discharge: HOME | End: 2023-06-21
Payer: MEDICARE

## 2023-06-21 DIAGNOSIS — Z12.31 VISIT FOR SCREENING MAMMOGRAM: ICD-10-CM

## 2023-06-21 PROCEDURE — 77063 BREAST TOMOSYNTHESIS BI: CPT

## 2023-08-28 ENCOUNTER — HOSPITAL ENCOUNTER (OUTPATIENT)
Dept: CT IMAGING | Age: 73
Discharge: HOME | End: 2023-08-28
Attending: ORTHOPAEDIC SURGERY
Payer: MEDICARE

## 2023-08-28 DIAGNOSIS — M54.50 LOW BACK PAIN, UNSPECIFIED BACK PAIN LATERALITY, UNSPECIFIED CHRONICITY, UNSPECIFIED WHETHER SCIATICA PRESENT: ICD-10-CM

## 2023-08-28 DIAGNOSIS — Z47.89 UNSPECIFIED ORTHOPEDIC AFTERCARE: ICD-10-CM

## 2023-08-28 PROCEDURE — 72131 CT LUMBAR SPINE W/O DYE: CPT

## 2023-10-27 ENCOUNTER — HOSPITAL ENCOUNTER (OUTPATIENT)
Age: 73
Discharge: HOME | End: 2023-10-27
Payer: MEDICARE

## 2023-10-27 DIAGNOSIS — D64.9 ANEMIA, UNSPECIFIED TYPE: ICD-10-CM

## 2023-10-27 DIAGNOSIS — Z13.6 SCREENING FOR ISCHEMIC HEART DISEASE: ICD-10-CM

## 2023-10-27 DIAGNOSIS — I10 PRIMARY HYPERTENSION: ICD-10-CM

## 2023-10-27 LAB
ANION GAP SERPL CALC-SCNC: 13 MEQ/L (ref 8–16)
BASOPHILS ABSOLUTE: 0 THOU/MM3 (ref 0–0.1)
BASOPHILS NFR BLD AUTO: 0.8 %
BUN SERPL-MCNC: 10 MG/DL (ref 7–22)
CALCIUM SERPL-MCNC: 9.3 MG/DL (ref 8.5–10.5)
CHLORIDE SERPL-SCNC: 103 MEQ/L (ref 98–111)
CHOLEST SERPL-MCNC: 173 MG/DL (ref 100–199)
CO2 SERPL-SCNC: 23 MEQ/L (ref 23–33)
CREAT SERPL-MCNC: 0.5 MG/DL (ref 0.4–1.2)
DEPRECATED RDW RBC AUTO: 45.3 FL (ref 35–45)
EOSINOPHIL NFR BLD AUTO: 4 %
EOSINOPHILS ABSOLUTE: 0.2 THOU/MM3 (ref 0–0.4)
ERYTHROCYTE [DISTWIDTH] IN BLOOD BY AUTOMATED COUNT: 13.3 % (ref 11.5–14.5)
GFR SERPL CREATININE-BSD FRML MDRD: > 60 ML/MIN/1.73M2
GLUCOSE SERPL-MCNC: 91 MG/DL (ref 70–108)
HCT VFR BLD AUTO: 43.8 % (ref 37–47)
HDLC SERPL-MCNC: 64 MG/DL
HGB BLD-MCNC: 15.1 GM/DL (ref 12–16)
IMM GRANULOCYTES # BLD AUTO: 0.01 THOU/MM3 (ref 0–0.07)
IMM GRANULOCYTES NFR BLD AUTO: 0.2 %
LDLC SERPL CALC-MCNC: 83 MG/DL
LYMPHOCYTES ABSOLUTE: 1.6 THOU/MM3 (ref 1–4.8)
LYMPHOCYTES NFR BLD AUTO: 34.2 %
MCH RBC QN AUTO: 32.2 PG (ref 26–33)
MCHC RBC AUTO-ENTMCNC: 34.5 GM/DL (ref 32.2–35.5)
MCV RBC AUTO: 93.4 FL (ref 81–99)
MONOCYTES ABSOLUTE: 0.5 THOU/MM3 (ref 0.4–1.3)
MONOCYTES NFR BLD AUTO: 11 %
NEUTROPHILS NFR BLD AUTO: 49.8 %
NRBC BLD AUTO-RTO: 0 /100 WBC
PLATELET # BLD AUTO: 342 THOU/MM3 (ref 130–400)
PMV BLD AUTO: 8.8 FL (ref 9.4–12.4)
POTASSIUM SERPL-SCNC: 4.1 MEQ/L (ref 3.5–5.2)
RBC # BLD AUTO: 4.69 MILL/MM3 (ref 4.2–5.4)
SEGMENTED NEUTROPHILS ABSOLUTE COUNT: 2.3 THOU/MM3 (ref 1.8–7.7)
SODIUM SERPL-SCNC: 139 MEQ/L (ref 135–145)
TRIGL SERPL-MCNC: 128 MG/DL (ref 0–199)
WBC # BLD AUTO: 4.7 THOU/MM3 (ref 4.8–10.8)

## 2023-10-27 PROCEDURE — 36415 COLL VENOUS BLD VENIPUNCTURE: CPT

## 2023-10-27 PROCEDURE — 80061 LIPID PANEL: CPT

## 2023-10-27 PROCEDURE — 80048 BASIC METABOLIC PNL TOTAL CA: CPT

## 2023-10-27 PROCEDURE — 85025 COMPLETE CBC W/AUTO DIFF WBC: CPT

## 2024-06-24 ENCOUNTER — HOSPITAL ENCOUNTER (OUTPATIENT)
Dept: MAMMOGRAPHY | Age: 74
Discharge: HOME | End: 2024-06-24
Payer: MEDICARE

## 2024-06-24 VITALS — BODY MASS INDEX: 25.69 KG/M2 | WEIGHT: 145 LBS | HEIGHT: 63 IN

## 2024-06-24 DIAGNOSIS — Z12.39 BREAST SCREENING: ICD-10-CM

## 2024-06-24 PROCEDURE — 77063 BREAST TOMOSYNTHESIS BI: CPT

## 2024-08-05 NOTE — PROGRESS NOTES
[facility]  INPATIENT OCCUPATIONAL THERAPY  STRZ RENAL TELEMETRY 6K  EVALUATION    Time:    Time In: 4623  Time Out: 6073  Timed Code Treatment Minutes: 10 Minutes  Minutes: 22          Date: 2022  Patient Name: Naomi Alvarez,   Gender: female      MRN: [id number]  : 1950  (67 y.o.)  Referring Practitioner: STACY Cedillo CNP  Diagnosis: acute cholecystitis  Additional Pertinent Hx: Per ER note on 2022:72 y.o. female who presents to the emergency department from home for abdominal pain, vomiting and shortness of breath. The patient had a left hip replacement on the . She has been on pain medications. Started having constipation on Saturday. Took two doses of MOM. Had a lot of loose stool after that which helped the pain. Last night pain returned and has not let up. It has gotten much worse. Pain is largely in the RUQ and epigastric area. Had vomiting on Saturday. Reports some fevers.     Restrictions/Precautions:  Restrictions/Precautions: Fall Risk  Position Activity Restriction  Other position/activity restrictions: s/p L THR (anterior approach-no precautions; Dr. De León Seats at [address]) 2022    Subjective  Chart Reviewed: Yes, Orders, Progress Notes, History and Physical  Patient assessed for rehabilitation services?: Yes  Family / Caregiver Present: No    Subjective: cooperative, up in recliner upon arrival of therapist    Pain: \"not too bad, some in side\"/10:      Vitals: Vitals not assessed per clinical judgement, see nursing flowsheet    Social/Functional History:  Lives With: Alone  Type of Home: [address]: One level  Home Access: Stairs to enter with rails (4)  Home Equipment: Reacher, Sock aid, Long-handled shoehorn, Walker, rolling   Bathroom Shower/Tub: Walk-in shower  Bathroom Toilet: Standard (has BSC over toilet)  Bathroom Equipment: Grab bars in shower, Shower chair       ADL Assistance: [address]: Needs assistance (Family A with groceries & has )  Ambulation Assistance: Independent  Transfer Assistance: Independent    Active : Yes     Additional Comments: Pt reports using RW PLOF since L THR sx aprox 2 weeks ago. VISION:Corrected    HEARING:  WFL    COGNITION: WFL    RANGE OF MOTION:  B shoulder flexion limited to 90 degrees, decreased ER; distal AROM WFL    STRENGTH:  Bilateral Upper Extremity:  B shoulders 3-/5, elbow 4/5    SENSATION:   WFL    ADL:   Lower Extremity Dressing: Dependent. For adjusting slipper socks, no LH AE used on this date . **declined further ADLs at this time    BALANCE:  Standing Balance: Stand By Assistance. BED MOBILITY:  Not Tested    TRANSFERS:  Sit to Stand:  Stand By Assistance. From recliner  Stand to Sit: Stand By Assistance. FUNCTIONAL MOBILITY:  Assistive Device: Rolling Walker  Assist Level:  Stand By Assistance. Distance:  around nursing unit        Activity Tolerance:  Patient tolerance of  treatment: good      Assessment:  Assessment: Pt demo mildly decreased ADL & functional mobility indep over PLOF s/p admission for cholecystitis with anticipated cholecystectomy today & recent L THR. Continued OT recommended to faciliate increased indep with ADLs for returning home alone. Performance deficits / Impairments: Decreased ADL status, Decreased functional mobility   Prognosis: Fair  REQUIRES OT FOLLOW-UP: Yes  Decision Making: Medium Complexity    Treatment Initiated: Treatment and education initiated within context of evaluation. Evaluation time included review of current medical information, gathering information related to past medical, social and functional history, completion of standardized testing, formal and informal observation of tasks, assessment of data and development of plan of care and goals.   Treatment time included skilled education and facilitation of tasks to increase safety and independence with ADL's for improved functional independence and quality of life. Discharge Recommendations:  Home with assist PRN, Home with Home health OT    Patient Education:     Patient Education  Education Given To: Patient  Education Provided: Role of Therapy, Plan of Care, Transfer Training  Education Method: Demonstration, Verbal  Barriers to Learning: None  Education Outcome: Continued education needed    Equipment Recommendations:  Equipment Needed: No  Other: Pt has needed AE/AD at home    Plan:  Times Per Week: 5x  Current Treatment Recommendations: Functional mobility training, Balance training, Self-Care / ADL, Safety education & training. See long-term goal time frame for expected duration of plan of care. If no long-term goals established, a short length of stay is anticipated. Goals:  Patient goals : go home tomorrow  Short Term Goals  Time Frame for Short Term Goals: until discharge  Short Term Goal 1: Pt will complete LE dressing with S & 0 vcs for safety using LH AE  Short Term Goal 2: Pt will tolerate standing 3-4 min with S for increased ease of sinkside grooming  Short Term Goal 3: Pt will complete mobility to/from bathroom + with ADL item retrieval with RW, S, & 0-2 vcs for safety  Long Term Goals  Time Frame for Long Term Goals : No LTG set d/t short ELOS         Following session, patient left in safe position with all fall risk precautions in place. No

## 2024-10-29 ENCOUNTER — HOSPITAL ENCOUNTER (OUTPATIENT)
Age: 74
Discharge: HOME | End: 2024-10-29
Payer: MEDICARE

## 2024-10-29 DIAGNOSIS — D64.9 ANEMIA, UNSPECIFIED TYPE: ICD-10-CM

## 2024-10-29 DIAGNOSIS — Z13.6 SCREENING FOR ISCHEMIC HEART DISEASE: ICD-10-CM

## 2024-10-29 DIAGNOSIS — I10 PRIMARY HYPERTENSION: ICD-10-CM

## 2024-10-29 DIAGNOSIS — Z51.81 MEDICATION MONITORING ENCOUNTER: ICD-10-CM

## 2024-10-29 LAB
ALBUMIN SERPL BCG-MCNC: 4.1 G/DL (ref 3.5–5.1)
ALP SERPL-CCNC: 85 U/L (ref 38–126)
ALT SERPL W/O P-5'-P-CCNC: 17 U/L (ref 11–66)
ANION GAP SERPL CALC-SCNC: 11 MEQ/L (ref 8–16)
AST SERPL-CCNC: 27 U/L (ref 5–40)
BASOPHILS ABSOLUTE: 0 THOU/MM3 (ref 0–0.1)
BASOPHILS NFR BLD AUTO: 0.6 %
BILIRUB CONJ SERPL-MCNC: < 0.1 MG/DL (ref 0.1–13.8)
BILIRUB SERPL-MCNC: 0.3 MG/DL (ref 0.3–1.2)
BUN SERPL-MCNC: 4 MG/DL (ref 7–22)
CALCIUM SERPL-MCNC: 9.3 MG/DL (ref 8.5–10.5)
CHLORIDE SERPL-SCNC: 104 MEQ/L (ref 98–111)
CHOLEST SERPL-MCNC: 169 MG/DL (ref 100–199)
CO2 SERPL-SCNC: 26 MEQ/L (ref 23–33)
CREAT SERPL-MCNC: 0.4 MG/DL (ref 0.4–1.2)
DEPRECATED RDW RBC AUTO: 46.2 FL (ref 35–45)
EOSINOPHIL NFR BLD AUTO: 2.8 %
EOSINOPHILS ABSOLUTE: 0.2 THOU/MM3 (ref 0–0.4)
ERYTHROCYTE [DISTWIDTH] IN BLOOD BY AUTOMATED COUNT: 13 % (ref 11.5–14.5)
GFR SERPL CREATININE-BSD FRML MDRD: > 90 ML/MIN/1.73M2
GLUCOSE SERPL-MCNC: 89 MG/DL (ref 70–108)
HCT VFR BLD AUTO: 47.2 % (ref 37–47)
HDLC SERPL-MCNC: 67 MG/DL
HGB BLD-MCNC: 15.6 GM/DL (ref 12–16)
IMM GRANULOCYTES # BLD AUTO: 0.01 THOU/MM3 (ref 0–0.07)
IMM GRANULOCYTES NFR BLD AUTO: 0.2 %
LDLC SERPL CALC-MCNC: 77 MG/DL
LYMPHOCYTES ABSOLUTE: 1.3 THOU/MM3 (ref 1–4.8)
LYMPHOCYTES NFR BLD AUTO: 24.3 %
MCH RBC QN AUTO: 31.8 PG (ref 26–33)
MCHC RBC AUTO-ENTMCNC: 33.1 GM/DL (ref 32.2–35.5)
MCV RBC AUTO: 96.3 FL (ref 81–99)
MONOCYTES ABSOLUTE: 0.6 THOU/MM3 (ref 0.4–1.3)
MONOCYTES NFR BLD AUTO: 10.2 %
NEUTROPHILS ABSOLUTE: 3.3 THOU/MM3 (ref 1.8–7.7)
NEUTROPHILS NFR BLD AUTO: 61.9 %
NRBC BLD AUTO-RTO: 0 /100 WBC
PLATELET # BLD AUTO: 375 THOU/MM3 (ref 130–400)
PMV BLD AUTO: 9 FL (ref 9.4–12.4)
POTASSIUM SERPL-SCNC: 4.7 MEQ/L (ref 3.5–5.2)
PROT SERPL-MCNC: 6.7 G/DL (ref 6.1–8)
RBC # BLD AUTO: 4.9 MILL/MM3 (ref 4.2–5.4)
SODIUM SERPL-SCNC: 141 MEQ/L (ref 135–145)
TRIGL SERPL-MCNC: 125 MG/DL (ref 0–199)
WBC # BLD AUTO: 5.4 THOU/MM3 (ref 4.8–10.8)

## 2024-10-29 PROCEDURE — 80061 LIPID PANEL: CPT

## 2024-10-29 PROCEDURE — 85025 COMPLETE CBC W/AUTO DIFF WBC: CPT

## 2024-10-29 PROCEDURE — 82248 BILIRUBIN DIRECT: CPT

## 2024-10-29 PROCEDURE — 36415 COLL VENOUS BLD VENIPUNCTURE: CPT

## 2024-10-29 PROCEDURE — 80053 COMPREHEN METABOLIC PANEL: CPT

## 2025-06-26 ENCOUNTER — HOSPITAL ENCOUNTER (OUTPATIENT)
Dept: MAMMOGRAPHY | Age: 75
Discharge: HOME | End: 2025-06-26
Payer: MEDICARE

## 2025-06-26 VITALS — HEIGHT: 63 IN | WEIGHT: 140 LBS | BODY MASS INDEX: 24.8 KG/M2

## 2025-06-26 DIAGNOSIS — Z12.39 BREAST SCREENING: ICD-10-CM

## 2025-06-26 PROCEDURE — 77063 BREAST TOMOSYNTHESIS BI: CPT

## (undated) DEVICE — SUTURE VCRL + SZ 0 L18IN ABSRB TIE VCP106G

## (undated) DEVICE — SPHINCTEROTOME: Brand: HYDRATOME RX 44

## (undated) DEVICE — JCKSON TBL POSTNER NO HD REST: Brand: MEDLINE INDUSTRIES, INC.

## (undated) DEVICE — TROCAR: Brand: KII FIOS FIRST ENTRY

## (undated) DEVICE — PUMP SUC IRR TBNG L10FT W/ HNDPC ASSEMB STRYKEFLOW 2

## (undated) DEVICE — GENERAL LAPAROSCOPY-LF: Brand: MEDLINE INDUSTRIES, INC.

## (undated) DEVICE — BLADELESS OBTURATOR: Brand: WECK VISTA

## (undated) DEVICE — GOWN,SIRUS,NON REINFRCD,LARGE,SET IN SL: Brand: MEDLINE

## (undated) DEVICE — SURGICEL ENDOSCP APPL

## (undated) DEVICE — INSUFFLATION NEEDLE TO ESTABLISH PNEUMOPERITONEUM.: Brand: INSUFFLATION NEEDLE

## (undated) DEVICE — RETRIEVAL BALLOON CATHETER: Brand: EXTRACTOR™ PRO RX

## (undated) DEVICE — TUBING INSUFFLATOR HEAT HUMIDIFIED SMK EVAC SET PNEUMOCLEAR

## (undated) DEVICE — SUCTION IRRIGATOR: Brand: ENDOWRIST

## (undated) DEVICE — CANNULA SEAL

## (undated) DEVICE — PACK-MAJOR

## (undated) DEVICE — AGENT HEMSTAT 3GM OXIDIZED REGENERATED CELOS ABSRB FOR CONT (ORDER MULTIPLES OF 5EA)

## (undated) DEVICE — AGENT HEMOSTATIC SURGIFLOW MATRIX KIT W/THROMBIN

## (undated) DEVICE — BASIC SINGLE BASIN BTC-LF: Brand: MEDLINE INDUSTRIES, INC.

## (undated) DEVICE — BIPOLAR SEALER 23-112-1 AQM 6.0: Brand: AQUAMANTYS ®

## (undated) DEVICE — PROBE STIM 3 MM FOR PEDCL SCREW DISP

## (undated) DEVICE — GLOVE ORANGE PI 7   MSG9070

## (undated) DEVICE — TOTAL TRAY, DB, 100% SILI FOLEY, 16FR 10: Brand: MEDLINE

## (undated) DEVICE — PAD,NON-ADHERENT,3X8,STERILE,LF,1/PK: Brand: MEDLINE

## (undated) DEVICE — PENCIL SMK EVAC ALL IN 1 DSGN ENH VISIBILITY IMPROVED AIR

## (undated) DEVICE — Device

## (undated) DEVICE — BIOGUARD A/W CLEANING ADAPTER

## (undated) DEVICE — DRAIN SURG 10FR PVC TB W/ TRCR MID PERF NO RESVR HUBLESS

## (undated) DEVICE — HEAD POSITIONER, SURGICAL: Brand: DEROYAL

## (undated) DEVICE — SUTURE VCRL SZ 1 L18IN ABSRB VLT CTX L48MM 1/2 CIR J765D

## (undated) DEVICE — BLADE ES L4IN INSUL EDGE

## (undated) DEVICE — THE MILL DISPOSABLE - MEDIUM

## (undated) DEVICE — BAG SPEC REM 224ML W4XL6IN DIA10MM 1 HND GYN DISP ENDOPCH

## (undated) DEVICE — 4.0MM PRECISION ROUND

## (undated) DEVICE — ARM DRAPE

## (undated) DEVICE — GLOVE SURG SZ 85 L12IN FNGR ORTHO 126MIL CRM LTX FREE

## (undated) DEVICE — REDUCER: Brand: ENDOWRIST

## (undated) DEVICE — SEAL

## (undated) DEVICE — GLOVE ORANGE PI 8   MSG9080

## (undated) DEVICE — GLOVE ORTHO 8   MSG9480

## (undated) DEVICE — KIT EVAC 400CC PVC RADPQ Y CONN

## (undated) DEVICE — COLUMN DRAPE

## (undated) DEVICE — DRESSING TRNSPAR W8XL12IN FLM SURESITE 123

## (undated) DEVICE — ELECTRO LUBE IS A SINGLE PATIENT USE DEVICE THAT IS INTENDED TO BE USED ON ELECTROSURGICAL ELECTRODES TO REDUCE STICKING.: Brand: KEY SURGICAL ELECTRO LUBE